# Patient Record
Sex: FEMALE | Race: WHITE | Employment: FULL TIME | URBAN - METROPOLITAN AREA
[De-identification: names, ages, dates, MRNs, and addresses within clinical notes are randomized per-mention and may not be internally consistent; named-entity substitution may affect disease eponyms.]

---

## 2017-01-27 ENCOUNTER — NURSE ONLY (OUTPATIENT)
Dept: INTERNAL MEDICINE | Age: 42
End: 2017-01-27

## 2017-01-27 DIAGNOSIS — E11.65 TYPE 2 DIABETES MELLITUS WITH HYPERGLYCEMIA, WITHOUT LONG-TERM CURRENT USE OF INSULIN (HCC): Primary | ICD-10-CM

## 2017-01-27 DIAGNOSIS — E55.9 VITAMIN D DEFICIENCY: ICD-10-CM

## 2017-01-27 DIAGNOSIS — E11.65 TYPE 2 DIABETES MELLITUS WITH HYPERGLYCEMIA, WITHOUT LONG-TERM CURRENT USE OF INSULIN (HCC): ICD-10-CM

## 2017-01-27 DIAGNOSIS — E03.9 ACQUIRED HYPOTHYROIDISM: ICD-10-CM

## 2017-01-27 DIAGNOSIS — E78.2 MIXED HYPERLIPIDEMIA: ICD-10-CM

## 2017-01-27 LAB
ALBUMIN SERPL-MCNC: 4.6 G/DL (ref 3.9–4.9)
ALP BLD-CCNC: 99 U/L (ref 40–130)
ALT SERPL-CCNC: 56 U/L (ref 0–33)
ANION GAP SERPL CALCULATED.3IONS-SCNC: 17 MEQ/L (ref 7–13)
AST SERPL-CCNC: 41 U/L (ref 0–35)
BASOPHILS ABSOLUTE: 0.1 K/UL (ref 0–0.2)
BASOPHILS RELATIVE PERCENT: 1.1 %
BILIRUB SERPL-MCNC: 0.4 MG/DL (ref 0–1.2)
BUN BLDV-MCNC: 18 MG/DL (ref 6–20)
CALCIUM SERPL-MCNC: 9.5 MG/DL (ref 8.6–10.2)
CHLORIDE BLD-SCNC: 97 MEQ/L (ref 98–107)
CHOLESTEROL, TOTAL: 156 MG/DL (ref 0–199)
CO2: 26 MEQ/L (ref 22–29)
CREAT SERPL-MCNC: 1.08 MG/DL (ref 0.5–0.9)
EOSINOPHILS ABSOLUTE: 0.2 K/UL (ref 0–0.7)
EOSINOPHILS RELATIVE PERCENT: 2.8 %
GFR AFRICAN AMERICAN: >60
GFR NON-AFRICAN AMERICAN: 55.3
GLOBULIN: 2.9 G/DL (ref 2.3–3.5)
GLUCOSE BLD-MCNC: 98 MG/DL (ref 74–109)
HBA1C MFR BLD: 6.6 % (ref 4.8–5.9)
HCT VFR BLD CALC: 37.7 % (ref 37–47)
HDLC SERPL-MCNC: 31 MG/DL (ref 40–59)
HEMOGLOBIN: 12.4 G/DL (ref 12–16)
LDL CHOLESTEROL CALCULATED: 82 MG/DL (ref 0–129)
LYMPHOCYTES ABSOLUTE: 2 K/UL (ref 1–4.8)
LYMPHOCYTES RELATIVE PERCENT: 29.3 %
MCH RBC QN AUTO: 30.8 PG (ref 27–31.3)
MCHC RBC AUTO-ENTMCNC: 32.9 % (ref 33–37)
MCV RBC AUTO: 93.9 FL (ref 82–100)
MONOCYTES ABSOLUTE: 0.5 K/UL (ref 0.2–0.8)
MONOCYTES RELATIVE PERCENT: 7.2 %
NEUTROPHILS ABSOLUTE: 4 K/UL (ref 1.4–6.5)
NEUTROPHILS RELATIVE PERCENT: 59.6 %
PDW BLD-RTO: 13.3 % (ref 11.5–14.5)
PLATELET # BLD: 296 K/UL (ref 130–400)
POTASSIUM SERPL-SCNC: 3.4 MEQ/L (ref 3.5–5.1)
RBC # BLD: 4.01 M/UL (ref 4.2–5.4)
SODIUM BLD-SCNC: 140 MEQ/L (ref 132–144)
TOTAL PROTEIN: 7.5 G/DL (ref 6.4–8.1)
TRIGL SERPL-MCNC: 213 MG/DL (ref 0–200)
TSH SERPL DL<=0.05 MIU/L-ACNC: 50.75 UIU/ML (ref 0.27–4.2)
VITAMIN D 25-HYDROXY: 33 NG/ML (ref 30–100)
WBC # BLD: 6.8 K/UL (ref 4.8–10.8)

## 2017-01-27 PROCEDURE — 36415 COLL VENOUS BLD VENIPUNCTURE: CPT | Performed by: PHYSICIAN ASSISTANT

## 2017-01-30 DIAGNOSIS — E87.6 HYPOKALEMIA: ICD-10-CM

## 2017-01-30 DIAGNOSIS — E03.9 ACQUIRED HYPOTHYROIDISM: Primary | ICD-10-CM

## 2017-01-30 RX ORDER — LEVOTHYROXINE SODIUM 0.15 MG/1
150 TABLET ORAL DAILY
Qty: 30 TABLET | Refills: 3 | Status: SHIPPED | OUTPATIENT
Start: 2017-01-30 | End: 2018-02-15 | Stop reason: SDUPTHER

## 2017-01-30 RX ORDER — POTASSIUM CHLORIDE 750 MG/1
TABLET, EXTENDED RELEASE ORAL
Qty: 36 TABLET | Refills: 1 | Status: SHIPPED | OUTPATIENT
Start: 2017-01-30 | End: 2018-03-08 | Stop reason: ALTCHOICE

## 2017-03-13 ENCOUNTER — PROCEDURE VISIT (OUTPATIENT)
Dept: OBGYN | Age: 42
End: 2017-03-13

## 2017-03-13 VITALS
HEART RATE: 96 BPM | DIASTOLIC BLOOD PRESSURE: 88 MMHG | WEIGHT: 187 LBS | BODY MASS INDEX: 39.42 KG/M2 | SYSTOLIC BLOOD PRESSURE: 138 MMHG

## 2017-03-13 DIAGNOSIS — N93.9 ABNORMAL UTERINE BLEEDING (AUB): Primary | ICD-10-CM

## 2017-03-13 LAB
CONTROL: YES
PREGNANCY TEST URINE, POC: NORMAL

## 2017-03-13 PROCEDURE — 58100 BIOPSY OF UTERUS LINING: CPT | Performed by: OBSTETRICS & GYNECOLOGY

## 2017-03-13 PROCEDURE — 99213 OFFICE O/P EST LOW 20 MIN: CPT | Performed by: OBSTETRICS & GYNECOLOGY

## 2017-03-13 PROCEDURE — 81025 URINE PREGNANCY TEST: CPT | Performed by: OBSTETRICS & GYNECOLOGY

## 2017-03-13 ASSESSMENT — ENCOUNTER SYMPTOMS
SHORTNESS OF BREATH: 0
VOMITING: 0
NAUSEA: 0
COUGH: 0

## 2017-03-27 ENCOUNTER — OFFICE VISIT (OUTPATIENT)
Dept: OBGYN | Age: 42
End: 2017-03-27

## 2017-03-27 VITALS
WEIGHT: 190 LBS | BODY MASS INDEX: 40.05 KG/M2 | HEART RATE: 92 BPM | DIASTOLIC BLOOD PRESSURE: 66 MMHG | SYSTOLIC BLOOD PRESSURE: 104 MMHG

## 2017-03-27 DIAGNOSIS — R93.5 ABNORMAL ULTRASOUND OF UTERUS: Primary | ICD-10-CM

## 2017-03-27 PROCEDURE — 99213 OFFICE O/P EST LOW 20 MIN: CPT | Performed by: OBSTETRICS & GYNECOLOGY

## 2017-03-28 ENCOUNTER — TELEPHONE (OUTPATIENT)
Dept: OBGYN | Age: 42
End: 2017-03-28

## 2017-03-29 RX ORDER — IBUPROFEN 800 MG/1
800 TABLET ORAL EVERY 8 HOURS PRN
Qty: 60 TABLET | Refills: 0 | Status: SHIPPED | OUTPATIENT
Start: 2017-03-29 | End: 2018-03-08 | Stop reason: ALTCHOICE

## 2017-04-02 ASSESSMENT — ENCOUNTER SYMPTOMS
SHORTNESS OF BREATH: 0
COUGH: 0
NAUSEA: 0
VOMITING: 0

## 2017-04-17 ENCOUNTER — TELEPHONE (OUTPATIENT)
Dept: OBGYN | Age: 42
End: 2017-04-17

## 2017-04-17 RX ORDER — DOXYCYCLINE HYCLATE 100 MG
100 TABLET ORAL 2 TIMES DAILY
Qty: 20 TABLET | Refills: 0 | Status: SHIPPED | OUTPATIENT
Start: 2017-04-17 | End: 2017-04-27

## 2017-04-20 ENCOUNTER — TELEPHONE (OUTPATIENT)
Dept: OBGYN | Age: 42
End: 2017-04-20

## 2017-07-03 RX ORDER — LORATADINE 10 MG/1
TABLET ORAL
Qty: 30 TABLET | Refills: 5 | Status: SHIPPED | OUTPATIENT
Start: 2017-07-03 | End: 2018-09-02 | Stop reason: SDUPTHER

## 2017-10-16 RX ORDER — OMEPRAZOLE 20 MG/1
CAPSULE, DELAYED RELEASE ORAL
Qty: 60 CAPSULE | Refills: 0 | Status: SHIPPED | OUTPATIENT
Start: 2017-10-16 | End: 2018-03-08 | Stop reason: SDUPTHER

## 2018-02-16 RX ORDER — LEVOTHYROXINE SODIUM 0.15 MG/1
150 TABLET ORAL DAILY
Qty: 30 TABLET | Refills: 3 | Status: SHIPPED | OUTPATIENT
Start: 2018-02-16 | End: 2019-01-22 | Stop reason: SDUPTHER

## 2018-03-08 ENCOUNTER — OFFICE VISIT (OUTPATIENT)
Dept: INTERNAL MEDICINE CLINIC | Age: 43
End: 2018-03-08
Payer: COMMERCIAL

## 2018-03-08 VITALS
WEIGHT: 202 LBS | HEIGHT: 57 IN | BODY MASS INDEX: 43.58 KG/M2 | DIASTOLIC BLOOD PRESSURE: 80 MMHG | RESPIRATION RATE: 20 BRPM | OXYGEN SATURATION: 97 % | SYSTOLIC BLOOD PRESSURE: 126 MMHG | TEMPERATURE: 97.8 F | HEART RATE: 86 BPM

## 2018-03-08 DIAGNOSIS — D64.9 ANEMIA, UNSPECIFIED TYPE: ICD-10-CM

## 2018-03-08 DIAGNOSIS — Z12.31 SCREENING MAMMOGRAM, ENCOUNTER FOR: ICD-10-CM

## 2018-03-08 DIAGNOSIS — G89.29 CHRONIC LEFT-SIDED LOW BACK PAIN WITHOUT SCIATICA: ICD-10-CM

## 2018-03-08 DIAGNOSIS — K21.9 GASTROESOPHAGEAL REFLUX DISEASE WITHOUT ESOPHAGITIS: ICD-10-CM

## 2018-03-08 DIAGNOSIS — E03.9 ACQUIRED HYPOTHYROIDISM: ICD-10-CM

## 2018-03-08 DIAGNOSIS — R60.0 LOCALIZED EDEMA: ICD-10-CM

## 2018-03-08 DIAGNOSIS — I10 ESSENTIAL HYPERTENSION: ICD-10-CM

## 2018-03-08 DIAGNOSIS — E78.2 MIXED HYPERLIPIDEMIA: ICD-10-CM

## 2018-03-08 DIAGNOSIS — M54.50 CHRONIC LEFT-SIDED LOW BACK PAIN WITHOUT SCIATICA: ICD-10-CM

## 2018-03-08 LAB
ALBUMIN SERPL-MCNC: 4.3 G/DL (ref 3.9–4.9)
ALP BLD-CCNC: 61 U/L (ref 40–130)
ALT SERPL-CCNC: 45 U/L (ref 0–33)
ANION GAP SERPL CALCULATED.3IONS-SCNC: 12 MEQ/L (ref 7–13)
AST SERPL-CCNC: 35 U/L (ref 0–35)
BASOPHILS ABSOLUTE: 0.1 K/UL (ref 0–0.2)
BASOPHILS RELATIVE PERCENT: 2 %
BILIRUB SERPL-MCNC: 0.4 MG/DL (ref 0–1.2)
BUN BLDV-MCNC: 15 MG/DL (ref 6–20)
CALCIUM SERPL-MCNC: 9.1 MG/DL (ref 8.6–10.2)
CHLORIDE BLD-SCNC: 95 MEQ/L (ref 98–107)
CHOLESTEROL, TOTAL: 256 MG/DL (ref 0–199)
CO2: 31 MEQ/L (ref 22–29)
CREAT SERPL-MCNC: 0.94 MG/DL (ref 0.5–0.9)
EOSINOPHILS ABSOLUTE: 0.2 K/UL (ref 0–0.7)
EOSINOPHILS RELATIVE PERCENT: 4.2 %
GFR AFRICAN AMERICAN: >60
GFR NON-AFRICAN AMERICAN: >60
GLOBULIN: 3.7 G/DL (ref 2.3–3.5)
GLUCOSE BLD-MCNC: 144 MG/DL (ref 74–109)
HCT VFR BLD CALC: 42.3 % (ref 37–47)
HDLC SERPL-MCNC: 35 MG/DL (ref 40–59)
HEMOGLOBIN: 13.8 G/DL (ref 12–16)
LDL CHOLESTEROL CALCULATED: 183 MG/DL (ref 0–129)
LYMPHOCYTES ABSOLUTE: 1.9 K/UL (ref 1–4.8)
LYMPHOCYTES RELATIVE PERCENT: 32.8 %
MCH RBC QN AUTO: 31.9 PG (ref 27–31.3)
MCHC RBC AUTO-ENTMCNC: 32.6 % (ref 33–37)
MCV RBC AUTO: 97.9 FL (ref 82–100)
MONOCYTES ABSOLUTE: 0.5 K/UL (ref 0.2–0.8)
MONOCYTES RELATIVE PERCENT: 8.5 %
NEUTROPHILS ABSOLUTE: 3.1 K/UL (ref 1.4–6.5)
NEUTROPHILS RELATIVE PERCENT: 52.5 %
PDW BLD-RTO: 14.9 % (ref 11.5–14.5)
PLATELET # BLD: 327 K/UL (ref 130–400)
POTASSIUM SERPL-SCNC: 4 MEQ/L (ref 3.5–5.1)
RBC # BLD: 4.32 M/UL (ref 4.2–5.4)
SODIUM BLD-SCNC: 138 MEQ/L (ref 132–144)
TOTAL PROTEIN: 8 G/DL (ref 6.4–8.1)
TRIGL SERPL-MCNC: 189 MG/DL (ref 0–200)
TSH REFLEX: 68.98 UIU/ML (ref 0.27–4.2)
WBC # BLD: 5.9 K/UL (ref 4.8–10.8)

## 2018-03-08 PROCEDURE — G8427 DOCREV CUR MEDS BY ELIG CLIN: HCPCS | Performed by: PHYSICIAN ASSISTANT

## 2018-03-08 PROCEDURE — 99214 OFFICE O/P EST MOD 30 MIN: CPT | Performed by: PHYSICIAN ASSISTANT

## 2018-03-08 PROCEDURE — 96127 BRIEF EMOTIONAL/BEHAV ASSMT: CPT | Performed by: PHYSICIAN ASSISTANT

## 2018-03-08 PROCEDURE — 3046F HEMOGLOBIN A1C LEVEL >9.0%: CPT | Performed by: PHYSICIAN ASSISTANT

## 2018-03-08 PROCEDURE — G8484 FLU IMMUNIZE NO ADMIN: HCPCS | Performed by: PHYSICIAN ASSISTANT

## 2018-03-08 PROCEDURE — G8417 CALC BMI ABV UP PARAM F/U: HCPCS | Performed by: PHYSICIAN ASSISTANT

## 2018-03-08 PROCEDURE — 1036F TOBACCO NON-USER: CPT | Performed by: PHYSICIAN ASSISTANT

## 2018-03-08 RX ORDER — TIZANIDINE 4 MG/1
4 TABLET ORAL 3 TIMES DAILY
Qty: 30 TABLET | Refills: 1 | Status: SHIPPED | OUTPATIENT
Start: 2018-03-08 | End: 2019-01-27 | Stop reason: SDUPTHER

## 2018-03-08 RX ORDER — NABUMETONE 500 MG/1
500 TABLET, FILM COATED ORAL 2 TIMES DAILY
Qty: 60 TABLET | Refills: 3 | Status: SHIPPED | OUTPATIENT
Start: 2018-03-08

## 2018-03-08 RX ORDER — SIMVASTATIN 80 MG
TABLET ORAL
Qty: 30 TABLET | Refills: 5 | Status: SHIPPED | OUTPATIENT
Start: 2018-03-08 | End: 2019-01-22 | Stop reason: SDUPTHER

## 2018-03-08 RX ORDER — OMEPRAZOLE 20 MG/1
CAPSULE, DELAYED RELEASE ORAL
Qty: 60 CAPSULE | Refills: 5 | Status: SHIPPED | OUTPATIENT
Start: 2018-03-08 | End: 2019-01-22 | Stop reason: SDUPTHER

## 2018-03-08 RX ORDER — TRIAMTERENE AND HYDROCHLOROTHIAZIDE 37.5; 25 MG/1; MG/1
TABLET ORAL
Qty: 30 TABLET | Refills: 5 | Status: SHIPPED | OUTPATIENT
Start: 2018-03-08 | End: 2019-01-22 | Stop reason: SDUPTHER

## 2018-03-08 ASSESSMENT — PATIENT HEALTH QUESTIONNAIRE - PHQ9
8. MOVING OR SPEAKING SO SLOWLY THAT OTHER PEOPLE COULD HAVE NOTICED. OR THE OPPOSITE, BEING SO FIGETY OR RESTLESS THAT YOU HAVE BEEN MOVING AROUND A LOT MORE THAN USUAL: 0
7. TROUBLE CONCENTRATING ON THINGS, SUCH AS READING THE NEWSPAPER OR WATCHING TELEVISION: 0
3. TROUBLE FALLING OR STAYING ASLEEP: 2
2. FEELING DOWN, DEPRESSED OR HOPELESS: 1
6. FEELING BAD ABOUT YOURSELF - OR THAT YOU ARE A FAILURE OR HAVE LET YOURSELF OR YOUR FAMILY DOWN: 1
10. IF YOU CHECKED OFF ANY PROBLEMS, HOW DIFFICULT HAVE THESE PROBLEMS MADE IT FOR YOU TO DO YOUR WORK, TAKE CARE OF THINGS AT HOME, OR GET ALONG WITH OTHER PEOPLE: 1
SUM OF ALL RESPONSES TO PHQ QUESTIONS 1-9: 9
4. FEELING TIRED OR HAVING LITTLE ENERGY: 3
9. THOUGHTS THAT YOU WOULD BE BETTER OFF DEAD, OR OF HURTING YOURSELF: 0
5. POOR APPETITE OR OVEREATING: 0
1. LITTLE INTEREST OR PLEASURE IN DOING THINGS: 2
SUM OF ALL RESPONSES TO PHQ9 QUESTIONS 1 & 2: 3

## 2018-03-08 ASSESSMENT — ENCOUNTER SYMPTOMS
COUGH: 1
GASTROINTESTINAL NEGATIVE: 1
BACK PAIN: 1
EYES NEGATIVE: 1
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
## 2018-03-09 LAB — T4 FREE: 0.82 NG/DL (ref 0.93–1.7)

## 2018-03-12 LAB — HBA1C MFR BLD: 7.9 % (ref 4.8–5.9)

## 2018-03-13 DIAGNOSIS — J30.89 ACUTE ALLERGIC RHINITIS DUE TO OTHER ALLERGEN, UNSPECIFIED SEASONALITY: Primary | ICD-10-CM

## 2018-03-13 RX ORDER — LANCETS 30 GAUGE
EACH MISCELLANEOUS
Qty: 100 EACH | Refills: 3 | Status: SHIPPED | OUTPATIENT
Start: 2018-03-13 | End: 2019-01-22 | Stop reason: SDUPTHER

## 2018-03-13 RX ORDER — FLUTICASONE PROPIONATE 50 MCG
1 SPRAY, SUSPENSION (ML) NASAL DAILY
Qty: 1 BOTTLE | Refills: 3 | Status: SHIPPED | OUTPATIENT
Start: 2018-03-13 | End: 2019-01-22 | Stop reason: SDUPTHER

## 2018-03-13 RX ORDER — BLOOD-GLUCOSE METER
1 KIT MISCELLANEOUS DAILY
Qty: 1 KIT | Refills: 0 | Status: SHIPPED | OUTPATIENT
Start: 2018-03-13

## 2018-09-04 RX ORDER — BENZOYL PEROXIDE
KIT TOPICAL
Qty: 30 TABLET | Refills: 5 | Status: SHIPPED | OUTPATIENT
Start: 2018-09-04 | End: 2020-02-15

## 2018-09-04 NOTE — TELEPHONE ENCOUNTER
Rx requested:  Requested Prescriptions     Pending Prescriptions Disp Refills    EQ ALLERGY RELIEF 10 MG tablet [Pharmacy Med Name: ALLERGY RELF 10MG   TAB] 30 tablet 5     Sig: TAKE ONE TABLET BY MOUTH ONCE DAILY       Last Office Visit:   3/8/2018      Next Visit Date:  No future appointments.

## 2019-01-21 ENCOUNTER — OFFICE VISIT (OUTPATIENT)
Dept: FAMILY MEDICINE CLINIC | Age: 44
End: 2019-01-21
Payer: COMMERCIAL

## 2019-01-21 VITALS
DIASTOLIC BLOOD PRESSURE: 76 MMHG | WEIGHT: 192 LBS | RESPIRATION RATE: 18 BRPM | OXYGEN SATURATION: 98 % | HEIGHT: 57 IN | SYSTOLIC BLOOD PRESSURE: 130 MMHG | BODY MASS INDEX: 41.42 KG/M2 | HEART RATE: 78 BPM | TEMPERATURE: 97.5 F

## 2019-01-21 DIAGNOSIS — R11.0 NAUSEA: ICD-10-CM

## 2019-01-21 DIAGNOSIS — Z86.69 HISTORY OF PERFORATION OF TYMPANIC MEMBRANE: Chronic | ICD-10-CM

## 2019-01-21 DIAGNOSIS — J06.9 VIRAL URI: ICD-10-CM

## 2019-01-21 DIAGNOSIS — H66.91 ACUTE OTITIS MEDIA WITH PERFORATION, RIGHT: Primary | Chronic | ICD-10-CM

## 2019-01-21 DIAGNOSIS — H72.91 ACUTE OTITIS MEDIA WITH PERFORATION, RIGHT: Primary | Chronic | ICD-10-CM

## 2019-01-21 DIAGNOSIS — H72.91 ACUTE OTITIS MEDIA WITH PERFORATION, RIGHT: Chronic | ICD-10-CM

## 2019-01-21 DIAGNOSIS — H66.91 ACUTE OTITIS MEDIA WITH PERFORATION, RIGHT: Chronic | ICD-10-CM

## 2019-01-21 PROCEDURE — G8427 DOCREV CUR MEDS BY ELIG CLIN: HCPCS | Performed by: FAMILY MEDICINE

## 2019-01-21 PROCEDURE — G8484 FLU IMMUNIZE NO ADMIN: HCPCS | Performed by: FAMILY MEDICINE

## 2019-01-21 PROCEDURE — 99214 OFFICE O/P EST MOD 30 MIN: CPT | Performed by: FAMILY MEDICINE

## 2019-01-21 PROCEDURE — 1036F TOBACCO NON-USER: CPT | Performed by: FAMILY MEDICINE

## 2019-01-21 PROCEDURE — G8417 CALC BMI ABV UP PARAM F/U: HCPCS | Performed by: FAMILY MEDICINE

## 2019-01-21 RX ORDER — AMOXICILLIN AND CLAVULANATE POTASSIUM 875; 125 MG/1; MG/1
1 TABLET, FILM COATED ORAL 2 TIMES DAILY
Qty: 20 TABLET | Refills: 0 | Status: SHIPPED | OUTPATIENT
Start: 2019-01-21 | End: 2019-01-31

## 2019-01-21 RX ORDER — CIPROFLOXACIN AND DEXAMETHASONE 3; 1 MG/ML; MG/ML
4 SUSPENSION/ DROPS AURICULAR (OTIC) 2 TIMES DAILY
Qty: 7.5 ML | Refills: 0 | Status: SHIPPED | OUTPATIENT
Start: 2019-01-21 | End: 2019-01-28

## 2019-01-21 RX ORDER — ONDANSETRON 4 MG/1
4 TABLET, ORALLY DISINTEGRATING ORAL 3 TIMES DAILY PRN
Qty: 21 TABLET | Refills: 0 | Status: SHIPPED | OUTPATIENT
Start: 2019-01-21 | End: 2019-04-30 | Stop reason: ALTCHOICE

## 2019-01-22 ENCOUNTER — OFFICE VISIT (OUTPATIENT)
Dept: INTERNAL MEDICINE CLINIC | Age: 44
End: 2019-01-22
Payer: COMMERCIAL

## 2019-01-22 VITALS
DIASTOLIC BLOOD PRESSURE: 70 MMHG | TEMPERATURE: 97.3 F | RESPIRATION RATE: 16 BRPM | SYSTOLIC BLOOD PRESSURE: 128 MMHG | HEIGHT: 58 IN | OXYGEN SATURATION: 97 % | HEART RATE: 68 BPM | BODY MASS INDEX: 40.47 KG/M2 | WEIGHT: 192.8 LBS

## 2019-01-22 DIAGNOSIS — Z11.1 VISIT FOR MANTOUX TEST: ICD-10-CM

## 2019-01-22 DIAGNOSIS — J30.9 ALLERGIC RHINITIS, UNSPECIFIED SEASONALITY, UNSPECIFIED TRIGGER: ICD-10-CM

## 2019-01-22 DIAGNOSIS — H65.01 RIGHT ACUTE SEROUS OTITIS MEDIA, RECURRENCE NOT SPECIFIED: ICD-10-CM

## 2019-01-22 DIAGNOSIS — I10 ESSENTIAL HYPERTENSION: ICD-10-CM

## 2019-01-22 DIAGNOSIS — Z12.31 SCREENING MAMMOGRAM, ENCOUNTER FOR: ICD-10-CM

## 2019-01-22 DIAGNOSIS — E03.9 ACQUIRED HYPOTHYROIDISM: ICD-10-CM

## 2019-01-22 DIAGNOSIS — Z11.1 SCREENING FOR TUBERCULOSIS: ICD-10-CM

## 2019-01-22 DIAGNOSIS — E11.9 TYPE 2 DIABETES MELLITUS WITHOUT COMPLICATION, WITHOUT LONG-TERM CURRENT USE OF INSULIN (HCC): Primary | ICD-10-CM

## 2019-01-22 DIAGNOSIS — E78.2 MIXED HYPERLIPIDEMIA: ICD-10-CM

## 2019-01-22 DIAGNOSIS — M54.9 ACUTE MIDLINE BACK PAIN, UNSPECIFIED BACK LOCATION: ICD-10-CM

## 2019-01-22 DIAGNOSIS — K21.9 GASTROESOPHAGEAL REFLUX DISEASE WITHOUT ESOPHAGITIS: ICD-10-CM

## 2019-01-22 DIAGNOSIS — L30.8 OTHER ECZEMA: ICD-10-CM

## 2019-01-22 LAB — HBA1C MFR BLD: 6.2 %

## 2019-01-22 PROCEDURE — 2022F DILAT RTA XM EVC RTNOPTHY: CPT | Performed by: PHYSICIAN ASSISTANT

## 2019-01-22 PROCEDURE — 3044F HG A1C LEVEL LT 7.0%: CPT | Performed by: PHYSICIAN ASSISTANT

## 2019-01-22 PROCEDURE — G8427 DOCREV CUR MEDS BY ELIG CLIN: HCPCS | Performed by: PHYSICIAN ASSISTANT

## 2019-01-22 PROCEDURE — 99214 OFFICE O/P EST MOD 30 MIN: CPT | Performed by: PHYSICIAN ASSISTANT

## 2019-01-22 PROCEDURE — 83036 HEMOGLOBIN GLYCOSYLATED A1C: CPT | Performed by: PHYSICIAN ASSISTANT

## 2019-01-22 PROCEDURE — G8417 CALC BMI ABV UP PARAM F/U: HCPCS | Performed by: PHYSICIAN ASSISTANT

## 2019-01-22 PROCEDURE — 1036F TOBACCO NON-USER: CPT | Performed by: PHYSICIAN ASSISTANT

## 2019-01-22 PROCEDURE — G8484 FLU IMMUNIZE NO ADMIN: HCPCS | Performed by: PHYSICIAN ASSISTANT

## 2019-01-22 RX ORDER — TRIAMTERENE AND HYDROCHLOROTHIAZIDE 37.5; 25 MG/1; MG/1
TABLET ORAL
Qty: 30 TABLET | Refills: 5 | Status: ON HOLD | OUTPATIENT
Start: 2019-01-22 | End: 2020-12-21

## 2019-01-22 RX ORDER — LEVOTHYROXINE SODIUM 0.15 MG/1
150 TABLET ORAL DAILY
Qty: 30 TABLET | Refills: 3 | Status: SHIPPED | OUTPATIENT
Start: 2019-01-22 | End: 2020-04-15 | Stop reason: SDUPTHER

## 2019-01-22 RX ORDER — SIMVASTATIN 80 MG
TABLET ORAL
Qty: 30 TABLET | Refills: 5 | Status: SHIPPED | OUTPATIENT
Start: 2019-01-22 | End: 2020-04-15 | Stop reason: SDUPTHER

## 2019-01-22 RX ORDER — IBUPROFEN 800 MG/1
800 TABLET ORAL EVERY 6 HOURS PRN
Qty: 120 TABLET | Refills: 1 | Status: SHIPPED | OUTPATIENT
Start: 2019-01-22 | End: 2020-04-16

## 2019-01-22 RX ORDER — LANCETS 30 GAUGE
EACH MISCELLANEOUS
Qty: 100 EACH | Refills: 3 | Status: SHIPPED | OUTPATIENT
Start: 2019-01-22

## 2019-01-22 RX ORDER — BLOOD-GLUCOSE METER
1 KIT MISCELLANEOUS DAILY
Qty: 1 KIT | Refills: 0 | Status: SHIPPED | OUTPATIENT
Start: 2019-01-22 | End: 2020-04-15 | Stop reason: SDUPTHER

## 2019-01-22 RX ORDER — OMEPRAZOLE 20 MG/1
CAPSULE, DELAYED RELEASE ORAL
Qty: 60 CAPSULE | Refills: 5 | Status: ON HOLD | OUTPATIENT
Start: 2019-01-22 | End: 2020-12-21

## 2019-01-22 RX ORDER — DESOXIMETASONE 0.5 MG/G
OINTMENT TOPICAL
Qty: 45 G | Refills: 1 | Status: SHIPPED | OUTPATIENT
Start: 2019-01-22 | End: 2021-09-02

## 2019-01-22 RX ORDER — FLUTICASONE PROPIONATE 50 MCG
1 SPRAY, SUSPENSION (ML) NASAL DAILY
Qty: 1 BOTTLE | Refills: 3 | Status: SHIPPED | OUTPATIENT
Start: 2019-01-22 | End: 2020-02-15

## 2019-01-22 RX ORDER — LANOLIN ALCOHOL/MO/W.PET/CERES
CREAM (GRAM) TOPICAL 2 TIMES DAILY
Qty: 1 CONTAINER | Refills: 3 | Status: SHIPPED | OUTPATIENT
Start: 2019-01-22 | End: 2021-09-02

## 2019-01-22 ASSESSMENT — ENCOUNTER SYMPTOMS
WHEEZING: 0
SHORTNESS OF BREATH: 0
CONSTIPATION: 0
BACK PAIN: 1
NAUSEA: 0
VOMITING: 0
DIARRHEA: 0
SORE THROAT: 1
EYE PAIN: 0
COUGH: 1
RECTAL PAIN: 0

## 2019-01-24 DIAGNOSIS — H66.91 ACUTE OTITIS MEDIA WITH PERFORATION, RIGHT: Primary | Chronic | ICD-10-CM

## 2019-01-24 DIAGNOSIS — H72.91 ACUTE OTITIS MEDIA WITH PERFORATION, RIGHT: Primary | Chronic | ICD-10-CM

## 2019-01-24 LAB
CULTURE EAR OR EYE: ABNORMAL
GRAM STAIN RESULT: ABNORMAL
ORGANISM: ABNORMAL
ORGANISM: ABNORMAL

## 2019-01-24 RX ORDER — SULFAMETHOXAZOLE AND TRIMETHOPRIM 800; 160 MG/1; MG/1
1 TABLET ORAL 2 TIMES DAILY
Qty: 14 TABLET | Refills: 0 | Status: SHIPPED | OUTPATIENT
Start: 2019-01-24 | End: 2019-01-31

## 2019-01-25 ENCOUNTER — NURSE ONLY (OUTPATIENT)
Dept: INTERNAL MEDICINE CLINIC | Age: 44
End: 2019-01-25
Payer: COMMERCIAL

## 2019-01-25 DIAGNOSIS — Z11.1 VISIT FOR TB SKIN TEST: Primary | ICD-10-CM

## 2019-01-25 LAB
INDURATION: NORMAL
TB SKIN TEST: NEGATIVE

## 2019-01-25 PROCEDURE — 86580 TB INTRADERMAL TEST: CPT | Performed by: PHYSICIAN ASSISTANT

## 2019-04-30 ENCOUNTER — OFFICE VISIT (OUTPATIENT)
Dept: FAMILY MEDICINE CLINIC | Age: 44
End: 2019-04-30
Payer: COMMERCIAL

## 2019-04-30 VITALS
TEMPERATURE: 97.5 F | HEIGHT: 60 IN | RESPIRATION RATE: 12 BRPM | OXYGEN SATURATION: 100 % | WEIGHT: 183 LBS | HEART RATE: 68 BPM | BODY MASS INDEX: 35.93 KG/M2 | DIASTOLIC BLOOD PRESSURE: 62 MMHG | SYSTOLIC BLOOD PRESSURE: 124 MMHG

## 2019-04-30 DIAGNOSIS — J01.90 ACUTE BACTERIAL SINUSITIS: Primary | ICD-10-CM

## 2019-04-30 DIAGNOSIS — B96.89 ACUTE BACTERIAL SINUSITIS: Primary | ICD-10-CM

## 2019-04-30 PROCEDURE — G8417 CALC BMI ABV UP PARAM F/U: HCPCS | Performed by: NURSE PRACTITIONER

## 2019-04-30 PROCEDURE — 99213 OFFICE O/P EST LOW 20 MIN: CPT | Performed by: NURSE PRACTITIONER

## 2019-04-30 PROCEDURE — 1036F TOBACCO NON-USER: CPT | Performed by: NURSE PRACTITIONER

## 2019-04-30 PROCEDURE — G8427 DOCREV CUR MEDS BY ELIG CLIN: HCPCS | Performed by: NURSE PRACTITIONER

## 2019-04-30 RX ORDER — DOXYCYCLINE HYCLATE 100 MG
100 TABLET ORAL 2 TIMES DAILY
Qty: 20 TABLET | Refills: 0 | Status: SHIPPED | OUTPATIENT
Start: 2019-04-30 | End: 2019-05-10

## 2019-04-30 ASSESSMENT — ENCOUNTER SYMPTOMS
NAUSEA: 1
PHOTOPHOBIA: 1
VOMITING: 0
SINUS PAIN: 1
RHINORRHEA: 1
COUGH: 0
SINUS PRESSURE: 1
DIARRHEA: 1
VISUAL CHANGE: 0

## 2019-04-30 ASSESSMENT — PATIENT HEALTH QUESTIONNAIRE - PHQ9
2. FEELING DOWN, DEPRESSED OR HOPELESS: 0
SUM OF ALL RESPONSES TO PHQ9 QUESTIONS 1 & 2: 0
1. LITTLE INTEREST OR PLEASURE IN DOING THINGS: 0
SUM OF ALL RESPONSES TO PHQ QUESTIONS 1-9: 0
SUM OF ALL RESPONSES TO PHQ QUESTIONS 1-9: 0

## 2019-04-30 NOTE — PATIENT INSTRUCTIONS
Patient Education        Sinusitis: Care Instructions  Your Care Instructions    Sinusitis is an infection of the lining of the sinus cavities in your head. Sinusitis often follows a cold. It causes pain and pressure in your head and face. In most cases, sinusitis gets better on its own in 1 to 2 weeks. But some mild symptoms may last for several weeks. Sometimes antibiotics are needed. Follow-up care is a key part of your treatment and safety. Be sure to make and go to all appointments, and call your doctor if you are having problems. It's also a good idea to know your test results and keep a list of the medicines you take. How can you care for yourself at home? · Take an over-the-counter pain medicine, such as acetaminophen (Tylenol), ibuprofen (Advil, Motrin), or naproxen (Aleve). Read and follow all instructions on the label. · If the doctor prescribed antibiotics, take them as directed. Do not stop taking them just because you feel better. You need to take the full course of antibiotics. · Be careful when taking over-the-counter cold or flu medicines and Tylenol at the same time. Many of these medicines have acetaminophen, which is Tylenol. Read the labels to make sure that you are not taking more than the recommended dose. Too much acetaminophen (Tylenol) can be harmful. · Breathe warm, moist air from a steamy shower, a hot bath, or a sink filled with hot water. Avoid cold, dry air. Using a humidifier in your home may help. Follow the directions for cleaning the machine. · Use saline (saltwater) nasal washes to help keep your nasal passages open and wash out mucus and bacteria. You can buy saline nose drops at a grocery store or drugstore. Or you can make your own at home by adding 1 teaspoon of salt and 1 teaspoon of baking soda to 2 cups of distilled water. If you make your own, fill a bulb syringe with the solution, insert the tip into your nostril, and squeeze gently. [place] your nose.   · Put a hot, wet towel or a warm gel pack on your face 3 or 4 times a day for 5 to 10 minutes each time. · Try a decongestant nasal spray like oxymetazoline (Afrin). Do not use it for more than 3 days in a row. Using it for more than 3 days can make your congestion worse. When should you call for help? Call your doctor now or seek immediate medical care if:    · You have new or worse swelling or redness in your face or around your eyes.     · You have a new or higher fever.    Watch closely for changes in your health, and be sure to contact your doctor if:    · You have new or worse facial pain.     · The mucus from your nose becomes thicker (like pus) or has new blood in it.     · You are not getting better as expected. Where can you learn more? Go to https://[email or web address] [email or web address].[email or web address]. org and sign in to your [employer] account. Enter U492 in the [employer] box to learn more about \"Sinusitis: Care Instructions. \"     If you do not have an account, please click on the \"Sign Up Now\" link. Current as of: March 27, 2018  Content Version: 11.9  © 0969-5135 [employer]. Care instructions adapted under license by [facility] ([place]). If you have questions about a medical condition or this instruction, always ask your healthcare professional. [address] any warranty or liability for your use of this information.

## 2019-04-30 NOTE — PROGRESS NOTES
Subjective:      Patient ID: Krystina Carpio is a 37 y.o. female who presents today with a complaint of   Chief Complaint   Patient presents with    Headache     x 3 days     Epistaxis     x 3days     Fatigue     x 3 days     Congestion     x 3 days    Has had recurrent nose bleeds, they are becoming more frequent. Woke up Monday morning with nosebleed, headache, stomach pain and diarrhea  The nose bleed has stopped  Other symptoms have continued. Symptoms are about the same. Headache    This is a new problem. Episode onset: 2-3 days ago. The problem occurs intermittently. The problem has been unchanged. The pain is located in the bilateral and frontal region. The quality of the pain is described as aching and throbbing. The pain is at a severity of 7/10 (a 9/10 at worst ). Associated symptoms include nausea, phonophobia, photophobia, rhinorrhea and sinus pressure. Pertinent negatives include no coughing, dizziness, visual change or vomiting. She has tried acetaminophen and NSAIDs for the symptoms. There is no history of migraine headaches.        Past Medical History:   Diagnosis Date    Asthma     Depression     Endometriosis 03/2017    Hypothyroidism     Postmenopausal 2008    Type II diabetes mellitus, uncontrolled ([address]      Past Surgical History:   Procedure Laterality Date    DENTAL SURGERY      ENDOMETRIAL BIOPSY  03/2017    Alice lala    INNER EAR SURGERY Right 1993    ear perforation    ROTATOR CUFF REPAIR Left 12/12/2014    TONSILLECTOMY AND ADENOIDECTOMY      TYMPANOSTOMY TUBE PLACEMENT       Family History   Problem Relation Age of Onset    Depression Mother     Diabetes Maternal Aunt     Diabetes Maternal Uncle     Asthma Maternal Grandmother     Cancer Maternal Grandmother         lung    High Blood Pressure Maternal Grandmother     High Blood Pressure Maternal Grandfather     Stroke Maternal Grandfather      Social History     Socioeconomic History    Marital status:  Spouse name: Not on file    Number of children: Not on file    Years of education: Not on file    Highest education level: Not on file   Occupational History    Not on file   Social Needs    Financial resource strain: Not on file    Food insecurity:     Worry: Not on file     Inability: Not on file    Transportation needs:     Medical: Not on file     Non-medical: Not on file   Tobacco Use    Smoking status: Never Smoker    Smokeless tobacco: Never Used   Substance and Sexual Activity    Alcohol use: No    Drug use: No    Sexual activity: Yes   Lifestyle    Physical activity:     Days per week: Not on file     Minutes per session: Not on file    Stress: Not on file   Relationships    Social connections:     Talks on phone: Not on file     Gets together: Not on file     Attends [employer] service: Not on file     Active member of club or organization: Not on file     Attends meetings of clubs or organizations: Not on file     Relationship status: Not on file    Intimate partner violence:     Fear of current or ex partner: Not on file     Emotionally abused: Not on file     Physically abused: Not on file     Forced sexual activity: Not on file   Other Topics Concern    Not on file   Social History Narrative    Not on file       Allergies:  Patient has no known allergies. Review of Systems   Constitutional: Negative for appetite change. HENT: Positive for congestion, nosebleeds, rhinorrhea, sinus pressure and sinus pain. Eyes: Positive for photophobia. Respiratory: Negative for cough. Gastrointestinal: Positive for diarrhea (about three times a day ) and nausea. Negative for vomiting. Neurological: Positive for headaches. Negative for dizziness.          Objective:   /62 (Site: Left Upper Arm, Position: Sitting, Cuff Size: Medium Adult)   Pulse 68   Temp 97.5 °F (36.4 °C) (Temporal)   Resp 12   Ht 5' (1.524 m)   Wt 183 lb (83 kg)   SpO2 100%   BMI 35.74 kg/m²   Physical Exam   Constitutional: She appears well-developed and well-nourished. She does not appear ill. No distress. HENT:   Right Ear: Tympanic membrane normal.   Left Ear: Tympanic membrane normal.   Nose: Mucosal edema and rhinorrhea present. Right sinus exhibits maxillary sinus tenderness and frontal sinus tenderness. Left sinus exhibits maxillary sinus tenderness and frontal sinus tenderness. Mouth/Throat: Oropharynx is clear and moist.   Cardiovascular: Normal rate and regular rhythm. No murmur heard. Pulmonary/Chest: Effort normal and breath sounds normal. No respiratory distress. Skin: She is not diaphoretic. No results found for this visit on 04/30/19. Assessment:       Diagnosis Orders   1. Acute bacterial sinusitis  doxycycline hyclate (VIBRA-TABS) 100 MG tablet           Plan:      No orders of the defined types were placed in this encounter. Orders Placed This Encounter   Medications    doxycycline hyclate (VIBRA-TABS) 100 MG tablet     Sig: Take 1 tablet by mouth 2 times daily for 10 days     Dispense:  20 tablet     Refill:  0     Treated for bacterial sinusitis based on clinical symptoms and history (length of illness, \"double sickening\", fever, purulent discharge, tenderness on palpation). . Advised to take full course of antibiotics. Follow up with PCP if no improvement in 5-6 days. Patient verbalized understanding. Return if symptoms worsen or fail to improve.     Casi Cordero, YANELY - CNP

## 2019-04-30 NOTE — LETTER
[address] [facility]  [address] [facility]  [address] [place] [id number]  Phone: [phone]  Fax: [phone]    YANELY Rojas CNP        April 30, 2019     Patient: Yadi Gonzalez   YOB: 1975   Date of Visit: 4/30/2019       To Whom it May Concern:    Yadi Gonzalez was seen in my clinic on 4/30/2019. She will return on 5/1    If you have any questions or concerns, please don't hesitate to call.     Sincerely,             YANELY Rojas CNP

## 2020-02-15 NOTE — TELEPHONE ENCOUNTER
Rx requested:  Requested Prescriptions     Pending Prescriptions Disp Refills    fluticasone (FLONASE) 50 MCG/ACT nasal spray [Pharmacy Med Name: Fluticasone Propionate 50 MCG/ACT Nasal Suspension] 1 Bottle 3     Sig: USE 1 SPRAY(S) IN EACH NOSTRIL ONCE DAILY       Last Office Visit:   1/22/2019      Next Visit Date:  No future appointments.

## 2020-02-17 RX ORDER — FLUTICASONE PROPIONATE 50 MCG
SPRAY, SUSPENSION (ML) NASAL
Qty: 1 BOTTLE | Refills: 3 | Status: SHIPPED | OUTPATIENT
Start: 2020-02-17

## 2020-02-17 RX ORDER — BENZOYL PEROXIDE
KIT TOPICAL
Qty: 30 TABLET | Refills: 2 | Status: SHIPPED | OUTPATIENT
Start: 2020-02-17

## 2020-04-15 ENCOUNTER — VIRTUAL VISIT (OUTPATIENT)
Dept: FAMILY MEDICINE CLINIC | Age: 45
End: 2020-04-15
Payer: COMMERCIAL

## 2020-04-15 PROCEDURE — 3046F HEMOGLOBIN A1C LEVEL >9.0%: CPT | Performed by: PHYSICIAN ASSISTANT

## 2020-04-15 PROCEDURE — G8428 CUR MEDS NOT DOCUMENT: HCPCS | Performed by: PHYSICIAN ASSISTANT

## 2020-04-15 PROCEDURE — 2022F DILAT RTA XM EVC RTNOPTHY: CPT | Performed by: PHYSICIAN ASSISTANT

## 2020-04-15 PROCEDURE — 99213 OFFICE O/P EST LOW 20 MIN: CPT | Performed by: PHYSICIAN ASSISTANT

## 2020-04-15 RX ORDER — BLOOD-GLUCOSE METER
1 KIT MISCELLANEOUS DAILY
Qty: 1 KIT | Refills: 0 | Status: SHIPPED | OUTPATIENT
Start: 2020-04-15

## 2020-04-15 RX ORDER — SIMVASTATIN 80 MG
TABLET ORAL
Qty: 30 TABLET | Refills: 3 | Status: SHIPPED | OUTPATIENT
Start: 2020-04-15 | End: 2021-09-02

## 2020-04-15 RX ORDER — LORATADINE 10 MG/1
10 TABLET ORAL DAILY
Qty: 30 TABLET | Refills: 3 | Status: SHIPPED | OUTPATIENT
Start: 2020-04-15 | End: 2021-01-13 | Stop reason: SDUPTHER

## 2020-04-15 RX ORDER — LEVOTHYROXINE SODIUM 0.15 MG/1
150 TABLET ORAL DAILY
Qty: 30 TABLET | Refills: 3 | Status: SHIPPED | OUTPATIENT
Start: 2020-04-15 | End: 2020-08-06 | Stop reason: SDUPTHER

## 2020-04-15 NOTE — PROGRESS NOTES
4/15/2020     Cherie Ramos (:  1975) is a 53 y.o. female, here for evaluation of the following medical concerns: Follow up  HPI  Telemedicine video visit due to concern for exposure to [place] [facility] (coronavirus). Patient is aware this is a billable visit. Patient has not been seen in over her year she has been out of her Synthroid and Glucophage for 3 months or more states her blood sugars have been well controlled despite not having medication with dietary control    Review of Systems   Allergic/Immunologic: Positive for environmental allergies. All other systems reviewed and are negative. Prior to Visit Medications    Medication Sig Taking? Authorizing Provider   simvastatin (ZOCOR) 80 MG tablet TAKE ONE TABLET BY MOUTH ONCE DAILY IN THE EVENING Yes Anika Stroud PA-C   metFORMIN (GLUCOPHAGE) 1000 MG tablet TAKE ONE TABLET BY MOUTH ONCE DAILY WITH  BREAKFAST Yes Anika Stroud PA-C   levothyroxine (LEVOTHROID) 150 MCG tablet Take 1 tablet by mouth daily Yes Anika Stroud PA-C   loratadine (CLARITIN) 10 MG tablet Take 1 tablet by mouth daily Yes Anika Stroud PA-C   glucose monitoring kit (FREESTYLE) monitoring kit 1 kit by Does not apply route daily Yes Anika Stroud PA-C   blood glucose test strips (ASCENSIA AUTODISC VI;ONE TOUCH ULTRA TEST VI) strip Test BID.   DX E11.8 NIDDM Yes Anika Stroud PA-C   EQ ALLERGY RELIEF 10 MG tablet TAKE 1 TABLET BY MOUTH ONCE DAILY  Anika Stroud PA-C   fluticasone (FLONASE) 50 MCG/ACT nasal spray USE 1 SPRAY(S) IN EACH NOSTRIL ONCE DAILY  Anika Stroud PA-C   tiZANidine (ZANAFLEX) 4 MG tablet TAKE 1 TABLET BY MOUTH THREE TIMES DAILY  Anika Stroud PA-C   ibuprofen (ADVIL;MOTRIN) 800 MG tablet Take 1 tablet by mouth every 6 hours as needed for Pain  Anika Stroud PA-C   triamterene-hydrochlorothiazide (MAXZIDE-25) 37.5-25 MG per tablet TAKE ONE TABLET BY MOUTH ONCE DAILY  Anika 3    glucose monitoring kit (FREESTYLE) monitoring kit     Si kit by Does not apply route daily     Dispense:  1 kit     Refill:  0    blood glucose test strips (ASCENSIA AUTODISC VI;ONE TOUCH ULTRA TEST VI) strip     Sig: Test BID. DX E11.8 NIDDM     Dispense:  100 strip     Refill:  6     Please fill what plan covers     Medications Discontinued During This Encounter   Medication Reason    simvastatin (ZOCOR) 80 MG tablet REORDER    metFORMIN (GLUCOPHAGE) 1000 MG tablet REORDER    levothyroxine (LEVOTHROID) 150 MCG tablet REORDER    glucose monitoring kit (FREESTYLE) monitoring kit REORDER    blood glucose test strips (ASCENSIA AUTODISC VI;ONE TOUCH ULTRA TEST VI) strip REORDER     Return in about 3 months (around 7/15/2020) for repeat labs, follow up on [facility]. Medications Discontinued During This Encounter   Medication Reason    simvastatin (ZOCOR) 80 MG tablet REORDER    metFORMIN (GLUCOPHAGE) 1000 MG tablet REORDER    levothyroxine (LEVOTHROID) 150 MCG tablet REORDER    glucose monitoring kit (FREESTYLE) monitoring kit REORDER    blood glucose test strips (ASCENSIA AUTODISC VI;ONE TOUCH ULTRA TEST VI) strip REORDER     Return in about 3 months (around 7/15/2020) for repeat labs, follow up on [facility]. Anika Stroud PA-C        Return in about 3 months (around 7/15/2020) for repeat labs, follow up on [facility]. An electronic signature was used to authenticate this note.     --Anika Stroud PA-C on 4/15/2020 at 11:07 AM

## 2020-08-06 ENCOUNTER — VIRTUAL VISIT (OUTPATIENT)
Dept: FAMILY MEDICINE CLINIC | Age: 45
End: 2020-08-06
Payer: COMMERCIAL

## 2020-08-06 PROCEDURE — 99442 PR PHYS/QHP TELEPHONE EVALUATION 11-20 MIN: CPT | Performed by: PHYSICIAN ASSISTANT

## 2020-08-06 RX ORDER — MECLIZINE HYDROCHLORIDE 25 MG/1
25 TABLET ORAL 3 TIMES DAILY PRN
Qty: 30 TABLET | Refills: 1 | Status: SHIPPED | OUTPATIENT
Start: 2020-08-06 | End: 2020-08-16

## 2020-08-06 RX ORDER — FLUTICASONE PROPIONATE 50 MCG
1 SPRAY, SUSPENSION (ML) NASAL DAILY
Qty: 1 BOTTLE | Refills: 0 | Status: SHIPPED | OUTPATIENT
Start: 2020-08-06

## 2020-08-06 RX ORDER — LEVOTHYROXINE SODIUM 0.15 MG/1
150 TABLET ORAL DAILY
Qty: 30 TABLET | Refills: 3 | Status: SHIPPED | OUTPATIENT
Start: 2020-08-06 | End: 2020-12-28

## 2020-08-06 ASSESSMENT — ENCOUNTER SYMPTOMS
EYES NEGATIVE: 1
GASTROINTESTINAL NEGATIVE: 1
SINUS PRESSURE: 1
SORE THROAT: 0
RHINORRHEA: 1
SINUS PAIN: 0
RESPIRATORY NEGATIVE: 1

## 2020-08-06 NOTE — PROGRESS NOTES
2020     Eveline Lepe (:  1975) is a 53 y.o. female, here for evaluation of the following medical concerns:  Dizziness, allergic  HPI  Medicine telephone visit due to concern for exposure to [facility] at [facility] (coronavirus). Patient is aware this is a billable visit. Patient with complaint of recurrent dizziness prescribed meclizine through urgent care which is very helpful but very sedating does complain of allergy sinus symptoms ear fullness also complaining of excessive wax in both ears been using Claritin but not Flonase  Needs refill on Synthroid she is taking daily  States she has not taken Glucophage in over a year since 2019 fasting blood sugar ranges from 120s to 140s    Review of Systems   Constitutional: Negative. HENT: Positive for congestion, postnasal drip, rhinorrhea and sinus pressure. Negative for sinus pain and sore throat. Eyes: Negative. Respiratory: Negative. Cardiovascular: Negative. Gastrointestinal: Negative. Endocrine: Negative. Genitourinary: Negative. Musculoskeletal: Negative. Allergic/Immunologic: Positive for environmental allergies. Neurological: Positive for dizziness. Hematological: Negative. Psychiatric/Behavioral: Negative. Prior to Visit Medications    Medication Sig Taking?  Authorizing Provider   carbamide peroxide (DEBROX) 6.5 % otic solution Place 5 drops in ear(s) 2 times daily Yes Anika Stroud PA-C   meclizine (ANTIVERT) 25 MG tablet Take 1 tablet by mouth 3 times daily as needed for Dizziness Yes Anika Stroud PA-C   fluticasone (FLONASE) 50 MCG/ACT nasal spray 1 spray by Each Nostril route daily Yes Anika Stroud PA-C   levothyroxine (LEVOTHROID) 150 MCG tablet Take 1 tablet by mouth daily Yes Anika Stroud PA-C   ibuprofen (ADVIL;MOTRIN) 800 MG tablet TAKE 1 TABLET BY MOUTH EVERY 6 HOURS AS NEEDED FOR PAIN Yes Anika Stroud PA-C   simvastatin (ZOCOR) 80 MG tablet TAKE ONE TABLET BY MOUTH ONCE DAILY IN THE EVENING Yes Anika Stroud PA-C   metFORMIN (GLUCOPHAGE) 1000 MG tablet TAKE ONE TABLET BY MOUTH ONCE DAILY WITH  BREAKFAST Yes Anika Stroud PA-C   loratadine (CLARITIN) 10 MG tablet Take 1 tablet by mouth daily Yes Anika Stroud PA-C   glucose monitoring kit (FREESTYLE) monitoring kit 1 kit by Does not apply route daily Yes Anika Stroud PA-C   blood glucose test strips (ASCENSIA AUTODISC VI;ONE TOUCH ULTRA TEST VI) strip Test BID. DX E11.8 NIDDM Yes Anika Stroud PA-C   EQ ALLERGY RELIEF 10 MG tablet TAKE 1 TABLET BY MOUTH ONCE DAILY Yes Anika Stroud PA-C   fluticasone (FLONASE) 50 MCG/ACT nasal spray USE 1 SPRAY(S) IN EACH NOSTRIL ONCE DAILY Yes Anika Stroud PA-C   tiZANidine (ZANAFLEX) 4 MG tablet TAKE 1 TABLET BY MOUTH THREE TIMES DAILY Yes Anika Stroud PA-C   triamterene-hydrochlorothiazide (MAXZIDE-25) 37.5-25 MG per tablet TAKE ONE TABLET BY MOUTH ONCE DAILY Yes Anika Stroud PA-C   omeprazole (PRILOSEC) 20 MG delayed release capsule TAKE ONE CAPSULE BY MOUTH TWICE DAILY Yes Anika Stroud PA-C   Lancets MISC Test BID. DX E11.8 NIDDM Yes Anika Stroud PA-C   desoximetasone (TOPICORT) 0.05 % OINT oitment Apply to affected area twice daily as needed Yes Anika Stroud PA-C   Skin Protectants, Misc. (HYDROCERIN) CREA cream Apply topically 2 times daily Yes Anika Stroud PA-C   glucose monitoring kit (FREESTYLE) monitoring kit 1 kit by Does not apply route daily DX E11.8 NIDDM Yes Anika Stroud PA-C   nabumetone (RELAFEN) 500 MG tablet Take 1 tablet by mouth 2 times daily Yes Anika Stroud PA-C        Social History     Tobacco Use    Smoking status: Never Smoker    Smokeless tobacco: Never Used   Substance Use Topics    Alcohol use: No        There were no vitals filed for this visit.   Estimated body mass index is 35.74 kg/m² as calculated from the

## 2020-08-07 ENCOUNTER — TELEPHONE (OUTPATIENT)
Dept: FAMILY MEDICINE CLINIC | Age: 45
End: 2020-08-07

## 2020-08-07 NOTE — TELEPHONE ENCOUNTER
Patient called to let you know that the eardrops were not covered by her insurance. I advised she could contact insurance to see what they will cover if any. She wanted to know what you would recommend.   Thank you

## 2020-08-13 ENCOUNTER — OFFICE VISIT (OUTPATIENT)
Dept: FAMILY MEDICINE CLINIC | Age: 45
End: 2020-08-13
Payer: COMMERCIAL

## 2020-08-13 VITALS
BODY MASS INDEX: 38.19 KG/M2 | SYSTOLIC BLOOD PRESSURE: 114 MMHG | TEMPERATURE: 97.5 F | WEIGHT: 177 LBS | OXYGEN SATURATION: 98 % | HEART RATE: 71 BPM | DIASTOLIC BLOOD PRESSURE: 80 MMHG | HEIGHT: 57 IN

## 2020-08-13 LAB — HBA1C MFR BLD: 6.7 %

## 2020-08-13 PROCEDURE — 83036 HEMOGLOBIN GLYCOSYLATED A1C: CPT | Performed by: PHYSICIAN ASSISTANT

## 2020-08-13 PROCEDURE — 1036F TOBACCO NON-USER: CPT | Performed by: PHYSICIAN ASSISTANT

## 2020-08-13 PROCEDURE — G8427 DOCREV CUR MEDS BY ELIG CLIN: HCPCS | Performed by: PHYSICIAN ASSISTANT

## 2020-08-13 PROCEDURE — G8417 CALC BMI ABV UP PARAM F/U: HCPCS | Performed by: PHYSICIAN ASSISTANT

## 2020-08-13 PROCEDURE — 3044F HG A1C LEVEL LT 7.0%: CPT | Performed by: PHYSICIAN ASSISTANT

## 2020-08-13 PROCEDURE — 99214 OFFICE O/P EST MOD 30 MIN: CPT | Performed by: PHYSICIAN ASSISTANT

## 2020-08-13 PROCEDURE — 2022F DILAT RTA XM EVC RTNOPTHY: CPT | Performed by: PHYSICIAN ASSISTANT

## 2020-08-13 ASSESSMENT — PATIENT HEALTH QUESTIONNAIRE - PHQ9
SUM OF ALL RESPONSES TO PHQ QUESTIONS 1-9: 1
SUM OF ALL RESPONSES TO PHQ QUESTIONS 1-9: 1
1. LITTLE INTEREST OR PLEASURE IN DOING THINGS: 0
2. FEELING DOWN, DEPRESSED OR HOPELESS: 1
SUM OF ALL RESPONSES TO PHQ9 QUESTIONS 1 & 2: 1

## 2020-08-13 NOTE — PROGRESS NOTES
SAW Stroud   EQ ALLERGY RELIEF 10 MG tablet TAKE 1 TABLET BY MOUTH ONCE DAILY  Anika Stroud PA-C   fluticasone (FLONASE) 50 MCG/ACT nasal spray USE 1 SPRAY(S) IN EACH NOSTRIL ONCE DAILY  Anika Stroud PA-C   tiZANidine (ZANAFLEX) 4 MG tablet TAKE 1 TABLET BY MOUTH THREE TIMES DAILY  Anika Stroud PA-C   triamterene-hydrochlorothiazide (MAXZIDE-25) 37.5-25 MG per tablet TAKE ONE TABLET BY MOUTH ONCE DAILY  Anika Stroud PA-C   omeprazole (PRILOSEC) 20 MG delayed release capsule TAKE ONE CAPSULE BY MOUTH TWICE DAILY  Anika Stroud PA-C   Lancets MISC Test BID. DX E11.8 NIDDM  Anika Stroud PA-C   desoximetasone (TOPICORT) 0.05 % OINT oitment Apply to affected area twice daily as needed  Anika Stroud PA-C   Skin Protectants, Misc. (HYDROCERIN) CREA cream Apply topically 2 times daily  Anika Stroud PA-C   glucose monitoring kit (FREESTYLE) monitoring kit 1 kit by Does not apply route daily DX E11.8 NIDDM  Anika Stroud PA-C   nabumetone (RELAFEN) 500 MG tablet Take 1 tablet by mouth 2 times daily  Anika Stroud PA-C        Social History     Tobacco Use    Smoking status: Never Smoker    Smokeless tobacco: Never Used   Substance Use Topics    Alcohol use: No        Vitals:    08/13/20 0956   BP: 114/80   Site: Left Upper Arm   Pulse: 71   Temp: 97.5 °F (36.4 °C)   TempSrc: Oral   SpO2: 98%   Weight: 177 lb (80.3 kg)   Height: 4' 9\" (1.448 m)     Estimated body mass index is 38.3 kg/m² as calculated from the following:    Height as of this encounter: 4' 9\" (1.448 m). Weight as of this encounter: 177 lb (80.3 kg). Physical Exam  Constitutional:       General: She is not in acute distress. Appearance: She is obese. She is not ill-appearing. HENT:      Head: Normocephalic and atraumatic. Right Ear: There is impacted cerumen. Left Ear: There is impacted cerumen.    Eyes:      Conjunctiva/sclera: Conjunctivae normal.      Pupils: Pupils are equal, round, and reactive to light. Comments: strabismus   Neck:      Musculoskeletal: Normal range of motion and neck supple. Thyroid: No thyromegaly. Cardiovascular:      Rate and Rhythm: Normal rate and regular rhythm. Heart sounds: Normal heart sounds. No murmur. Pulmonary:      Effort: Pulmonary effort is normal. No respiratory distress. Breath sounds: Normal breath sounds. No wheezing. Abdominal:      General: Bowel sounds are normal.      Palpations: Abdomen is soft. There is no mass. Tenderness: There is no abdominal tenderness. There is no guarding. Musculoskeletal: Normal range of motion. Right lower leg: No edema. Left lower leg: No edema. Lymphadenopathy:      Cervical: No cervical adenopathy. Skin:     General: Skin is warm and dry. Coloration: Skin is pale. Skin is not jaundiced. Neurological:      General: No focal deficit present. Mental Status: She is alert and oriented to person, place, and time. Cranial Nerves: No cranial nerve deficit. Motor: No weakness. Coordination: Coordination normal.      Gait: Gait normal.   Psychiatric:         Mood and Affect: Mood normal.         Behavior: Behavior normal.         Thought Content: Thought content normal.         Judgment: Judgment normal.              Assessment & Plan    Diagnosis Orders   1. Type 2 diabetes mellitus without complication, without long-term current use of insulin (HCC)  POCT glycosylated hemoglobin (Hb A1C)    a1c 6.7 - limit carbs   2. Bilateral impacted cerumen     3. Acquired hypothyroidism     4. Fatigue, unspecified type     5.  Screening mammogram, encounter for  LOU DIGITAL SCREEN W OR WO CAD BILATERAL         Orders Placed This Encounter   Procedures    LOU DIGITAL SCREEN W OR WO CAD BILATERAL     Standing Status:   Future     Standing Expiration Date:   10/13/2021    POCT glycosylated hemoglobin (Hb A1C)     No orders of the defined types were placed in this encounter. An electronic signature was used to authenticate this note.     --Anika Stroud PA-C on 8/13/2020 at 10:28 AM

## 2020-12-21 ENCOUNTER — HOSPITAL ENCOUNTER (INPATIENT)
Age: 45
LOS: 5 days | Discharge: HOME HEALTH | DRG: 427 | End: 2020-12-26
Attending: EMERGENCY MEDICINE | Admitting: INTERNAL MEDICINE
Payer: COMMERCIAL

## 2020-12-21 ENCOUNTER — APPOINTMENT (OUTPATIENT)
Dept: CT IMAGING | Age: 45
DRG: 427 | End: 2020-12-21
Payer: COMMERCIAL

## 2020-12-21 PROBLEM — G43.809 MIGRAINE VARIANT: Status: ACTIVE | Noted: 2020-12-21

## 2020-12-21 LAB
ALBUMIN SERPL-MCNC: 4.9 G/DL (ref 3.5–4.6)
ALP BLD-CCNC: 61 U/L (ref 40–130)
ALT SERPL-CCNC: 16 U/L (ref 0–33)
ANION GAP SERPL CALCULATED.3IONS-SCNC: 10 MEQ/L (ref 9–15)
AST SERPL-CCNC: 30 U/L (ref 0–35)
BASOPHILS ABSOLUTE: 0.1 K/UL (ref 0–0.2)
BASOPHILS RELATIVE PERCENT: 1.1 %
BILIRUB SERPL-MCNC: 0.4 MG/DL (ref 0.2–0.7)
BUN BLDV-MCNC: 12 MG/DL (ref 6–20)
CALCIUM SERPL-MCNC: 8.9 MG/DL (ref 8.5–9.9)
CHLORIDE BLD-SCNC: 94 MEQ/L (ref 95–107)
CO2: 28 MEQ/L (ref 20–31)
CREAT SERPL-MCNC: 1.08 MG/DL (ref 0.5–0.9)
EKG ATRIAL RATE: 55 BPM
EKG P AXIS: 40 DEGREES
EKG P-R INTERVAL: 184 MS
EKG Q-T INTERVAL: 534 MS
EKG QRS DURATION: 102 MS
EKG QTC CALCULATION (BAZETT): 510 MS
EKG R AXIS: -53 DEGREES
EKG T AXIS: 180 DEGREES
EKG VENTRICULAR RATE: 55 BPM
EOSINOPHILS ABSOLUTE: 0.1 K/UL (ref 0–0.7)
EOSINOPHILS RELATIVE PERCENT: 1.2 %
GFR AFRICAN AMERICAN: >60
GFR NON-AFRICAN AMERICAN: 54.4
GLOBULIN: 2.9 G/DL (ref 2.3–3.5)
GLUCOSE BLD-MCNC: 116 MG/DL (ref 60–115)
GLUCOSE BLD-MCNC: 150 MG/DL (ref 60–115)
GLUCOSE BLD-MCNC: 176 MG/DL (ref 60–115)
GLUCOSE BLD-MCNC: 183 MG/DL (ref 70–99)
HBA1C MFR BLD: 6.8 % (ref 4.8–5.9)
HCT VFR BLD CALC: 37.6 % (ref 37–47)
HEMOGLOBIN: 12.8 G/DL (ref 12–16)
LYMPHOCYTES ABSOLUTE: 0.8 K/UL (ref 1–4.8)
LYMPHOCYTES RELATIVE PERCENT: 14.5 %
MAGNESIUM: 2.3 MG/DL (ref 1.7–2.4)
MCH RBC QN AUTO: 31.7 PG (ref 27–31.3)
MCHC RBC AUTO-ENTMCNC: 34 % (ref 33–37)
MCV RBC AUTO: 93.4 FL (ref 82–100)
MONOCYTES ABSOLUTE: 0.2 K/UL (ref 0.2–0.8)
MONOCYTES RELATIVE PERCENT: 4.1 %
NEUTROPHILS ABSOLUTE: 4.5 K/UL (ref 1.4–6.5)
NEUTROPHILS RELATIVE PERCENT: 79.1 %
PDW BLD-RTO: 15.2 % (ref 11.5–14.5)
PERFORMED ON: ABNORMAL
PLATELET # BLD: 319 K/UL (ref 130–400)
POTASSIUM REFLEX MAGNESIUM: 3.5 MEQ/L (ref 3.4–4.9)
RBC # BLD: 4.02 M/UL (ref 4.2–5.4)
SARS-COV-2, NAAT: NOT DETECTED
SODIUM BLD-SCNC: 132 MEQ/L (ref 135–144)
T4 FREE: <0.03 NG/DL (ref 0.84–1.68)
TOTAL PROTEIN: 7.8 G/DL (ref 6.3–8)
TSH REFLEX: 229.4 UIU/ML (ref 0.44–3.86)
WBC # BLD: 5.7 K/UL (ref 4.8–10.8)

## 2020-12-21 PROCEDURE — 96375 TX/PRO/DX INJ NEW DRUG ADDON: CPT

## 2020-12-21 PROCEDURE — 70450 CT HEAD/BRAIN W/O DYE: CPT

## 2020-12-21 PROCEDURE — 6370000000 HC RX 637 (ALT 250 FOR IP): Performed by: EMERGENCY MEDICINE

## 2020-12-21 PROCEDURE — 2580000003 HC RX 258: Performed by: INTERNAL MEDICINE

## 2020-12-21 PROCEDURE — 36415 COLL VENOUS BLD VENIPUNCTURE: CPT

## 2020-12-21 PROCEDURE — 93005 ELECTROCARDIOGRAM TRACING: CPT | Performed by: EMERGENCY MEDICINE

## 2020-12-21 PROCEDURE — 84439 ASSAY OF FREE THYROXINE: CPT

## 2020-12-21 PROCEDURE — 96374 THER/PROPH/DIAG INJ IV PUSH: CPT

## 2020-12-21 PROCEDURE — G0378 HOSPITAL OBSERVATION PER HR: HCPCS

## 2020-12-21 PROCEDURE — 84443 ASSAY THYROID STIM HORMONE: CPT

## 2020-12-21 PROCEDURE — 99285 EMERGENCY DEPT VISIT HI MDM: CPT

## 2020-12-21 PROCEDURE — U0002 COVID-19 LAB TEST NON-CDC: HCPCS

## 2020-12-21 PROCEDURE — 83735 ASSAY OF MAGNESIUM: CPT

## 2020-12-21 PROCEDURE — 6360000002 HC RX W HCPCS: Performed by: INTERNAL MEDICINE

## 2020-12-21 PROCEDURE — 6360000002 HC RX W HCPCS: Performed by: EMERGENCY MEDICINE

## 2020-12-21 PROCEDURE — 1210000000 HC MED SURG R&B

## 2020-12-21 PROCEDURE — 2580000003 HC RX 258: Performed by: EMERGENCY MEDICINE

## 2020-12-21 PROCEDURE — 83036 HEMOGLOBIN GLYCOSYLATED A1C: CPT

## 2020-12-21 PROCEDURE — 80053 COMPREHEN METABOLIC PANEL: CPT

## 2020-12-21 PROCEDURE — 85025 COMPLETE CBC W/AUTO DIFF WBC: CPT

## 2020-12-21 RX ORDER — ACETAMINOPHEN 325 MG/1
650 TABLET ORAL EVERY 6 HOURS PRN
Status: DISCONTINUED | OUTPATIENT
Start: 2020-12-21 | End: 2020-12-26 | Stop reason: HOSPADM

## 2020-12-21 RX ORDER — SODIUM CHLORIDE 0.9 % (FLUSH) 0.9 %
10 SYRINGE (ML) INJECTION EVERY 12 HOURS SCHEDULED
Status: DISCONTINUED | OUTPATIENT
Start: 2020-12-21 | End: 2020-12-26 | Stop reason: HOSPADM

## 2020-12-21 RX ORDER — PROCHLORPERAZINE EDISYLATE 5 MG/ML
10 INJECTION INTRAMUSCULAR; INTRAVENOUS EVERY 6 HOURS PRN
Status: DISCONTINUED | OUTPATIENT
Start: 2020-12-21 | End: 2020-12-26 | Stop reason: HOSPADM

## 2020-12-21 RX ORDER — LORAZEPAM 2 MG/ML
1 INJECTION INTRAMUSCULAR ONCE
Status: COMPLETED | OUTPATIENT
Start: 2020-12-21 | End: 2020-12-21

## 2020-12-21 RX ORDER — PROMETHAZINE HYDROCHLORIDE 12.5 MG/1
12.5 TABLET ORAL EVERY 6 HOURS PRN
Status: DISCONTINUED | OUTPATIENT
Start: 2020-12-21 | End: 2020-12-26 | Stop reason: HOSPADM

## 2020-12-21 RX ORDER — KETOROLAC TROMETHAMINE 30 MG/ML
30 INJECTION, SOLUTION INTRAMUSCULAR; INTRAVENOUS ONCE
Status: COMPLETED | OUTPATIENT
Start: 2020-12-21 | End: 2020-12-21

## 2020-12-21 RX ORDER — 0.9 % SODIUM CHLORIDE 0.9 %
500 INTRAVENOUS SOLUTION INTRAVENOUS ONCE
Status: COMPLETED | OUTPATIENT
Start: 2020-12-21 | End: 2020-12-21

## 2020-12-21 RX ORDER — ASPIRIN 325 MG
325 TABLET ORAL ONCE
Status: COMPLETED | OUTPATIENT
Start: 2020-12-21 | End: 2020-12-21

## 2020-12-21 RX ORDER — DEXTROSE MONOHYDRATE 50 MG/ML
100 INJECTION, SOLUTION INTRAVENOUS PRN
Status: DISCONTINUED | OUTPATIENT
Start: 2020-12-21 | End: 2020-12-26 | Stop reason: HOSPADM

## 2020-12-21 RX ORDER — SUMATRIPTAN 6 MG/.5ML
6 INJECTION, SOLUTION SUBCUTANEOUS ONCE
Status: DISCONTINUED | OUTPATIENT
Start: 2020-12-21 | End: 2020-12-22 | Stop reason: SDUPTHER

## 2020-12-21 RX ORDER — SODIUM CHLORIDE 0.9 % (FLUSH) 0.9 %
10 SYRINGE (ML) INJECTION PRN
Status: DISCONTINUED | OUTPATIENT
Start: 2020-12-21 | End: 2020-12-26 | Stop reason: HOSPADM

## 2020-12-21 RX ORDER — POLYETHYLENE GLYCOL 3350 17 G/17G
17 POWDER, FOR SOLUTION ORAL DAILY PRN
Status: DISCONTINUED | OUTPATIENT
Start: 2020-12-21 | End: 2020-12-26 | Stop reason: HOSPADM

## 2020-12-21 RX ORDER — ACETAMINOPHEN 650 MG/1
650 SUPPOSITORY RECTAL EVERY 6 HOURS PRN
Status: DISCONTINUED | OUTPATIENT
Start: 2020-12-21 | End: 2020-12-26 | Stop reason: HOSPADM

## 2020-12-21 RX ORDER — NICOTINE POLACRILEX 4 MG
15 LOZENGE BUCCAL PRN
Status: DISCONTINUED | OUTPATIENT
Start: 2020-12-21 | End: 2020-12-26 | Stop reason: HOSPADM

## 2020-12-21 RX ORDER — ONDANSETRON 2 MG/ML
4 INJECTION INTRAMUSCULAR; INTRAVENOUS EVERY 6 HOURS PRN
Status: DISCONTINUED | OUTPATIENT
Start: 2020-12-21 | End: 2020-12-26 | Stop reason: HOSPADM

## 2020-12-21 RX ORDER — DEXTROSE MONOHYDRATE 25 G/50ML
12.5 INJECTION, SOLUTION INTRAVENOUS PRN
Status: DISCONTINUED | OUTPATIENT
Start: 2020-12-21 | End: 2020-12-26 | Stop reason: HOSPADM

## 2020-12-21 RX ORDER — INSULIN GLARGINE 100 [IU]/ML
15 INJECTION, SOLUTION SUBCUTANEOUS NIGHTLY
Status: DISCONTINUED | OUTPATIENT
Start: 2020-12-21 | End: 2020-12-22

## 2020-12-21 RX ADMIN — PROCHLORPERAZINE EDISYLATE 10 MG: 5 INJECTION INTRAMUSCULAR; INTRAVENOUS at 11:19

## 2020-12-21 RX ADMIN — SODIUM CHLORIDE 500 ML: 9 INJECTION, SOLUTION INTRAVENOUS at 11:19

## 2020-12-21 RX ADMIN — ASPIRIN 325 MG: 325 TABLET, FILM COATED ORAL at 13:08

## 2020-12-21 RX ADMIN — Medication 10 ML: at 20:39

## 2020-12-21 RX ADMIN — ENOXAPARIN SODIUM 40 MG: 100 INJECTION SUBCUTANEOUS at 20:39

## 2020-12-21 RX ADMIN — LORAZEPAM 1 MG: 2 INJECTION INTRAMUSCULAR; INTRAVENOUS at 13:08

## 2020-12-21 RX ADMIN — KETOROLAC TROMETHAMINE 30 MG: 30 INJECTION, SOLUTION INTRAMUSCULAR at 11:18

## 2020-12-21 ASSESSMENT — PAIN DESCRIPTION - DESCRIPTORS
DESCRIPTORS: ACHING
DESCRIPTORS: ACHING

## 2020-12-21 ASSESSMENT — PAIN SCALES - GENERAL
PAINLEVEL_OUTOF10: 10
PAINLEVEL_OUTOF10: 5
PAINLEVEL_OUTOF10: 0
PAINLEVEL_OUTOF10: 10
PAINLEVEL_OUTOF10: 0

## 2020-12-21 ASSESSMENT — ENCOUNTER SYMPTOMS
SORE THROAT: 0
WHEEZING: 0
RHINORRHEA: 0
PHOTOPHOBIA: 0
SHORTNESS OF BREATH: 0
COLOR CHANGE: 0
ABDOMINAL DISTENTION: 0
FACIAL SWELLING: 0
NAUSEA: 1
DIARRHEA: 0
VOMITING: 1
EYE DISCHARGE: 0
COUGH: 0
ABDOMINAL PAIN: 0

## 2020-12-21 ASSESSMENT — PAIN DESCRIPTION - PAIN TYPE
TYPE: ACUTE PAIN
TYPE: ACUTE PAIN

## 2020-12-21 ASSESSMENT — PAIN DESCRIPTION - FREQUENCY
FREQUENCY: CONTINUOUS
FREQUENCY: CONTINUOUS

## 2020-12-21 ASSESSMENT — PAIN DESCRIPTION - ORIENTATION: ORIENTATION: ANTERIOR

## 2020-12-21 ASSESSMENT — PAIN DESCRIPTION - LOCATION
LOCATION: HEAD
LOCATION: HEAD

## 2020-12-21 NOTE — ED PROVIDER NOTES
[facility] ED  eMERGENCY dEPARTMENT eNCOUnter      Pt Name: Kulwant Roe  MRN: [id number]  [place] 1975  Date of evaluation: 12/21/2020  Provider: Da Vega [address] [place] [address]       Chief Complaint   Patient presents with    Headache         HISTORY OF PRESENT ILLNESS   (Location/Symptom, Timing/Onset,Context/Setting, Quality, Duration, Modifying Factors, Severity)  Note limiting factors. Kulwant Roe is a 50 y.o. female who presents to the emergency department with a chief complaint of headache. She complains of a frontal headache with onset last night gradually worsening. No head injury. She feels that her vision is slightly blurred on the right side and that her vertigo got kicked in \"full force\". She went to bed and woke up with the same headache and when she got up she was \"walking drunk\". She does state that she has been under a large amount of stress lately financially and otherwise and though she has had vertigo and headaches in the past, they have never been this bad and usually resolves when she was able to have a good night sleep. She did not attempt to take any medicine for it. She has been vomiting because the headache has hurt her so badly. She does state that she has had allergies lately and an uptick in bloody noses for which she was requesting an ENT appointment through her doctor. She feels that her right side of her face in her right upper extremity feel kind of tingly compared to the rest of her body. She denies severe migraine history reporting just history of mild headaches off and on. She states that her vertigo has never been this bad nor had any visual or numbness deficits associated with it. She denies throbbing nature of her head or tenderness to the touch. She denies any sensitivity to light or sound. HPI    NursingNotes were reviewed.     REVIEW OF SYSTEMS    (2-9 systems for level 4, 10 or more for level 5)     Review of Systems Constitutional: Negative for activity change and appetite change. HENT: Negative for congestion, facial swelling, rhinorrhea and sore throat. Eyes: Positive for visual disturbance. Negative for photophobia and discharge. Respiratory: Negative for cough, shortness of breath and wheezing. Cardiovascular: Negative for chest pain and leg swelling. Gastrointestinal: Positive for nausea and vomiting. Negative for abdominal distention, abdominal pain and diarrhea. Endocrine: Negative for polydipsia and polyphagia. Genitourinary: Negative for difficulty urinating, frequency, vaginal bleeding and vaginal discharge. Musculoskeletal: Negative for gait problem and myalgias. Skin: Negative for color change. Allergic/Immunologic: Negative for immunocompromised state. Neurological: Positive for dizziness and headaches. Negative for weakness and light-headedness. Hematological: Negative for adenopathy. Psychiatric/Behavioral: Negative for behavioral problems. The patient is nervous/anxious. Except as noted above the remainder of the review of systems was reviewed and negative. PAST MEDICAL HISTORY     Past Medical History:   Diagnosis Date    Asthma     Depression     Endometriosis 03/2017    Hypothyroidism     Postmenopausal 2008    Type II diabetes mellitus, uncontrolled ([facility] [address]          SURGICALHISTORY       Past Surgical History:   Procedure Laterality Date    DENTAL SURGERY      ENDOMETRIAL BIOPSY  03/2017    Alice lala    INNER EAR SURGERY Right 1993    ear perforation    ROTATOR CUFF REPAIR Left 12/12/2014    TONSILLECTOMY AND ADENOIDECTOMY      TYMPANOSTOMY TUBE PLACEMENT           CURRENT MEDICATIONS       Previous Medications    BLOOD GLUCOSE TEST STRIPS (ASCENSIA AUTODISC VI;ONE TOUCH ULTRA TEST VI) STRIP    Test BID.   DX E11.8 NIDDM    DESOXIMETASONE (TOPICORT) 0.05 % OINT OITMENT    Apply to affected area twice daily as needed  Years of education: None    Highest education level: None   Occupational History    None   Social Needs    Financial resource strain: None    Food insecurity     Worry: None     Inability: None    Transportation needs     Medical: None     Non-medical: None   Tobacco Use    Smoking status: Never Smoker    Smokeless tobacco: Never Used   Substance and Sexual Activity    Alcohol use: No    Drug use: No    Sexual activity: Yes   Lifestyle    Physical activity     Days per week: None     Minutes per session: None    Stress: None   Relationships    Social connections     Talks on phone: None     Gets together: None     Attends Anabaptist service: None     Active member of club or organization: None     Attends meetings of clubs or organizations: None     Relationship status: None    Intimate partner violence     Fear of current or ex partner: None     Emotionally abused: None     Physically abused: None     Forced sexual activity: None   Other Topics Concern    None   Social History Narrative    None       SCREENINGS   NIH Stroke Scale  NIH Stroke Scale Assessed: Yes  Interval: Baseline  Level of Consciousness (1a. ): Alert  LOC Questions (1b. ):  Answers both correctly  LOC Commands (1c. ): Performs both tasks correctly  Best Gaze (2. ): Normal  Visual (3. ): (!) Partial hemianopia  Facial Palsy (4. ): Normal symmetrical movement  Motor Arm, Left (5a. ): No drift  Motor Arm, Right (5b. ): No drift  Motor Leg, Left (6a. ): No drift  Motor Leg, Right (6b. ): No drift  Limb Ataxia (7. ): (!) Present in one limb  Sensory (8. ): (!) Mild to Moderate  Best Language (9. ): No aphasia  Dysarthria (10. ): Normal  Extinction and Inattention (11): No abnormality  Total: 3Glasgow Coma Scale  Eye Opening: Spontaneous  Best Verbal Response: Oriented  Best Motor Response: Obeys commands  Shital Coma Scale Score: 15 @FLOW([id number])@      PHYSICAL EXAM    (up to 7 for level 4, 8 or more for level 5) ED Triage Vitals [12/21/20 1032]   BP Temp Temp Source Pulse Resp SpO2 Height Weight   (!) 169/89 97.9 °F (36.6 °C) Temporal 55 18 100 % 4' 9\" (1.448 m) 200 lb (90.7 kg)       Physical Exam  Constitutional:       General: She is in acute distress. Appearance: She is not ill-appearing or toxic-appearing. HENT:      Head: Normocephalic and atraumatic. Right Ear: Ear canal normal.      Left Ear: Ear canal normal.      Ears:      Comments: Tm's occluded by wax     Nose:      Comments: Left sided boggy turbinate     Mouth/Throat:      Pharynx: No oropharyngeal exudate or posterior oropharyngeal erythema. Eyes:      Extraocular Movements: Extraocular movements intact. Pupils: Pupils are equal, round, and reactive to light. Comments: Positive rightward nystagmus   Neck:      Musculoskeletal: No neck rigidity or muscular tenderness. Cardiovascular:      Rate and Rhythm: Bradycardia present. Heart sounds: No murmur. No friction rub. No gallop. Pulmonary:      Breath sounds: No wheezing. Abdominal:      Tenderness: There is no abdominal tenderness. Musculoskeletal:      Right lower leg: No edema. Left lower leg: No edema. Lymphadenopathy:      Cervical: No cervical adenopathy. Skin:     Findings: No bruising or rash. Neurological:      General: No focal deficit present. Mental Status: She is alert and oriented to person, place, and time. Cranial Nerves: No cranial nerve deficit. Motor: No weakness. Psychiatric:         Mood and Affect: Mood normal.         DIAGNOSTIC RESULTS     EKG: All EKG's are interpreted by the Emergency Department Physician who either signs or Co-signsthis chart in the absence of a cardiologist.    Sinus bradycardia at 55 bpm with T wave inversions in most leads; there is no old EKG for comparison. Patient is ranging from 47-55 on cardiac monitoring.     RADIOLOGY: Non-plain filmimages such as CT, Ultrasound and MRI are read by the radiologist. Plain radiographic images are visualized and preliminarily interpreted by the emergency physician with the below findings:        Interpretation per the Radiologist below, if available at the time ofthis note:    CT Head WO Contrast   Final Result      No acute intracranial process. Paranasal sinus disease. ED BEDSIDE ULTRASOUND:   Performed by ED Physician - none    LABS:  Labs Reviewed   POCT GLUCOSE - Abnormal; Notable for the following components:       Result Value    POC Glucose 176 (*)     All other components within normal limits       All other labs were within normal range or not returned as of this dictation. EMERGENCY DEPARTMENT COURSE and DIFFERENTIAL DIAGNOSIS/MDM:   Vitals:    Vitals:    12/21/20 1032 12/21/20 1241   BP: (!) 169/89 (!) 162/92   Pulse: 55 51   Resp: 18 18   Temp: 97.9 °F (36.6 °C)    TempSrc: Temporal    SpO2: 100% 96%   Weight: 200 lb (90.7 kg)    Height: 4' 9\" (1.448 m)        Patient feels somewhat better after IV therapy and being reassured that her head CT is negative. There is concern for CVA however given her vision deficits and subjective complaints of numbness that her right face and right upper extremity. After being medicated patient is attempted to be ambulated and practically face plants secondary to ataxia. She is given an aspirin and invited to stay in the hospital to control her vertigo and have further work-up. Patient has 2040 visual acuity in the right eye and 20/20 in the left per my bedside assessment initially. She reports improvement of the visual loss at reevaluation prior to attempt of ambulation however she remains 20/40 on exam.  She feels like she is able to open her eye better secondary to the pain being resolved. She is given a dose of IV Ativan to help with the vertigo even more while awaiting admission.     MDM    CRITICAL CARE TIME Total Critical Care time was 0 minutes, excluding separately reportableprocedures. There was a high probability of clinicallysignificant/life threatening deterioration in the patient's condition which required my urgent intervention. CONSULTS:  None    PROCEDURES:  Unless otherwise noted below, none     Procedures    FINAL IMPRESSION      1. Other headache syndrome    2. Benign paroxysmal positional vertigo, unspecified laterality    3. Ataxia          DISPOSITION/PLAN   DISPOSITION        PATIENT REFERRED TO:  No follow-up provider specified.     DISCHARGE MEDICATIONS:  New Prescriptions    No medications on file          (Please note that portions of this note were completed with a voice recognition program.  Efforts were made to edit the dictations but occasionally words are mis-transcribed.)    Susan Dubois DO (electronically signed)  Attending Emergency Physician          Susan Dubois DO  12/21/20 192 Roc Winters DO  12/21/20 1325

## 2020-12-21 NOTE — ED NOTES
Attempted to get pt out of bed to ambulate and pt took one step and nearly fell foreward. Pt placed back into bed. Sriram Rosa Maria Bethtense Revecyn  12/21/20 0857

## 2020-12-21 NOTE — FLOWSHEET NOTE
Pt arrived to floor and ambulated with stready gait into the bed. Pt very sleepy form medication given to her in the ER but does arouse when woken up. Pt denies pain at this time and does not feel like eating at this time. Pt states she takes a thyroid medication at home. Pt has a cell phone with her but refuses to remove sweat pants states she is cold. Pt does not have any teeth but has dentures but does not have them with her. Electronically signed by Liliana Rhoades LPN on 34/20/1557 at 3:32 PM

## 2020-12-21 NOTE — ED NOTES
States pain is gone. Resp even and unlabored. Drowsy, but easily arousable. Galina Anand RN  12/21/20 9451

## 2020-12-21 NOTE — H&P
Hospitalist History and Physical Note  Name: Shanice Dong  Age: 50 y.o. Gender: female  CodeStatus: No Order  Allergies: No Known Allergies    Chief Complaint:Headache    Primary Care Provider: Niharika Hayden PA-C  InpatientTreatment Team: Treatment Team: Attending Provider: Abundio Issa DO; LPN: Sarahi Gao LPN; Patient Care Tech: Corazon [facility]; [facility] Care [facility]: [facility] [place] [facility]  Admission Date: 12/21/2020      Subjective:  73-year-old female with pmhx of DM type II, hypothyroidism, depression, vertigo and asthma presented to the ED for c/o frontal headache. Reports last night the headache gradually worsened. Reports blurred vision to R side. States she went to bed and woke up with the same headache. Denies any recent head injuries. Reports she gets headaches about 3 times per week. Does not take anything for headaches and it \"goes away on its own\". She reports emesis with this headache. Did not take anything for the headache. Reports R side of face and upper extremity feel tingly. Reports history of vertigo but states she has not had symptoms like this. Upon arrival to the ED she had CT scan that was negative for acute process. Just paranasal sinus disease. She was given ASA, toradol, imitrex, and ativan in the ED. Reports her vision improved after these medications. Upon exam she was lethargic but easily to arouse. Alert and oriented. Reports her headahce has subsided. Aware of admission and plan of care      Physical Exam  Constitutional:       Appearance: She is overweight. HENT:      Right Ear: External ear normal.      Left Ear: External ear normal.      Nose: Nose normal.      Mouth/Throat:      Mouth: Mucous membranes are dry. Pharynx: Oropharynx is clear. Eyes:      Pupils: Pupils are equal, round, and reactive to light. Comments: 4mm and reactive bilaterally   Neck:      Musculoskeletal: Normal range of motion and neck supple.    Cardiovascular:      Rate and Rhythm: Normal rate and regular rhythm. Heart sounds: Normal heart sounds. Pulmonary:      Effort: Pulmonary effort is normal.      Breath sounds: Normal breath sounds. Abdominal:      General: Bowel sounds are normal.      Palpations: Abdomen is soft. Musculoskeletal: Normal range of motion. Skin:     General: Skin is warm and dry. Capillary Refill: Capillary refill takes less than 2 seconds. Neurological:      General: No focal deficit present. Mental Status: She is oriented to person, place, and time and easily aroused. She is lethargic. Psychiatric:         Mood and Affect: Mood normal.         Review of Systems   Gastrointestinal: Positive for nausea and vomiting. Neurological: Positive for headaches. All other systems reviewed and are negative.       Past Medical History:   Diagnosis Date    Asthma     Depression     Endometriosis 03/2017    Hypothyroidism     Postmenopausal 2008    Type II diabetes mellitus, uncontrolled ([address]      Past Surgical History:   Procedure Laterality Date    DENTAL SURGERY      ENDOMETRIAL BIOPSY  03/2017    Alice lala    INNER EAR SURGERY Right 1993    ear perforation    ROTATOR CUFF REPAIR Left 12/12/2014    TONSILLECTOMY AND ADENOIDECTOMY      TYMPANOSTOMY TUBE PLACEMENT       Social History     Tobacco History     Smoking Status  Never Smoker    Smokeless Tobacco Use  Never Used          Alcohol History     Alcohol Use Status  No          Drug Use     Drug Use Status  No          Sexual Activity     Sexually Active  Yes               Family History   Problem Relation Age of Onset    Depression Mother     Diabetes Maternal Aunt     Diabetes Maternal Uncle     Asthma Maternal Grandmother     Cancer Maternal Grandmother         lung    High Blood Pressure Maternal Grandmother     High Blood Pressure Maternal Grandfather     Stroke Maternal Grandfather          Medications:  Reviewed  Prior to Admission medications    Medication Sig Start Date End Date Taking? Authorizing Provider   fluticasone (FLONASE) 50 MCG/ACT nasal spray 1 spray by Each Nostril route daily 8/6/20   Anika Stroud PA-C   levothyroxine (LEVOTHROID) 150 MCG tablet Take 1 tablet by mouth daily 8/6/20   Anika Stroud PA-C   ibuprofen (ADVIL;MOTRIN) 800 MG tablet TAKE 1 TABLET BY MOUTH EVERY 6 HOURS AS NEEDED FOR PAIN 4/16/20   Anika Stroud PA-C   simvastatin (ZOCOR) 80 MG tablet TAKE ONE TABLET BY MOUTH ONCE DAILY IN THE EVENING 4/15/20   Anika Stroud PA-C   metFORMIN (GLUCOPHAGE) 1000 MG tablet TAKE ONE TABLET BY MOUTH ONCE DAILY WITH  BREAKFAST 4/15/20   Anika Stroud PA-C   loratadine (CLARITIN) 10 MG tablet Take 1 tablet by mouth daily 4/15/20   Anika Stroud PA-C   glucose monitoring kit (FREESTYLE) monitoring kit 1 kit by Does not apply route daily 4/15/20   Anika Stroud PA-C   blood glucose test strips (ASCENSIA AUTODISC VI;ONE TOUCH ULTRA TEST VI) strip Test BID. DX E11.8 NIDDM 4/15/20   Anika Stroud PA-C   EQ ALLERGY RELIEF 10 MG tablet TAKE 1 TABLET BY MOUTH ONCE DAILY 2/17/20   Anika Stroud PA-C   fluticasone (FLONASE) 50 MCG/ACT nasal spray USE 1 SPRAY(S) IN EACH NOSTRIL ONCE DAILY 2/17/20   Anika Stroud PA-C   tiZANidine (ZANAFLEX) 4 MG tablet TAKE 1 TABLET BY MOUTH THREE TIMES DAILY 1/28/19   Anika Stroud PA-C   triamterene-hydrochlorothiazide (MAXZIDE-25) 37.5-25 MG per tablet TAKE ONE TABLET BY MOUTH ONCE DAILY 1/22/19   Anika Stroud PA-C   omeprazole (PRILOSEC) 20 MG delayed release capsule TAKE ONE CAPSULE BY MOUTH TWICE DAILY 1/22/19   Anika Stroud PA-C   Lancets MISC Test BID. DX E11.8 NIDDM 1/22/19   Anika Stroud PA-C   desoximetasone (TOPICORT) 0.05 % OINT oitment Apply to affected area twice daily as needed 1/22/19   Anika Stroud PA-C   Skin Protectants, Misc.  (HYDROCERIN) CREA cream Apply topically 2 times daily 1/22/19 Ainka Stroud PA-C   glucose monitoring kit (FREESTYLE) monitoring kit 1 kit by Does not apply route daily DX E11.8 NIDDM 3/13/18   Anika Stroud PA-C   nabumetone (RELAFEN) 500 MG tablet Take 1 tablet by mouth 2 times daily 3/8/18   Anika Stroud PA-C       Current Facility-Administered Medications   Medication Dose Route Frequency Provider Last Rate Last Admin    prochlorperazine (COMPAZINE) injection 10 mg  10 mg Intravenous Q6H PRN Romepiyushe Sandra, DO   10 mg at 12/21/20 1119    SUMAtriptan (IMITREX) injection 6 mg  6 mg Subcutaneous Once Doyle Credit, DO            Infusion Medications:   Scheduled Medications:    SUMAtriptan  6 mg Subcutaneous Once     PRN Meds: prochlorperazine    Labs:   Recent Labs     12/21/20  1330   WBC 5.7   HGB 12.8   HCT 37.6        Recent Labs     12/21/20  1330   *   K 3.5   CL 94*   CO2 28   BUN 12   CREATININE 1.08*   CALCIUM 8.9     Recent Labs     12/21/20  1330   AST 30   ALT 16   BILITOT 0.4   ALKPHOS 61     No results for input(s): INR in the last 72 hours. No results for input(s): Connee Matar in the last 72 hours. Urinalysis:   No results found for: Alyne Claw, BACTERIA, RBCUA, BLOODU, [address], [address]    Radiology:   Most recent    Chest CT      WITH CONTRAST:No results found for this or any previous visit. WITHOUT CONTRAST: No results found for this or any previous visit. CXR      2-view: No results found for this or any previous visit. Portable: No results found for this or any previous visit. Echo No results found for this or any previous visit.           Assessment/Plan:    Active Problems:    Migraine variant: Consult neuro, continue neuro checks, continue imitrex, tylenol  DM Type II: SSI, hypoglycemia protocol, check A1c  DVT prop: lovenox  Hypothyroidism: Check TSH  Active Hospital Problems    Diagnosis Date Noted    Migraine variant [G43.809] 12/21/2020       Additional work up or/and treatment plan may be added today or then after based on clinical progression. I am managing a portion of pt care. Some medical issues are handled byother specialists. Additional work up and treatment should be done in out pt setting by pt PCP and other out pt providers. In addition to examining and evaluating pt, I spent additional time explaining care, normaland abnormal findings, and treatment plan. All of pt questions were answered. Counseling, diet and education were provided. Case will be discussed with nursing staff when appropriate. Family will be updated if and whenappropriate.       Electronically signed by YANELY Barker NP on 12/21/2020 at 3:37 PM

## 2020-12-21 NOTE — ED PROVIDER NOTES
[facility] [facility] ED  eMERGENCY dEPARTMENT eNCOUnter      Pt Name: Clau Duff  MRN: [id number]  [place] 1975  Date of evaluation: 12/21/2020  [facility] Flores [facility]       Chief Complaint   Patient presents with    Headache         HISTORY OF PRESENT ILLNESS   (Location/Symptom, Timing/Onset,Context/Setting, Quality, Duration, Modifying Factors, Severity)  Note limiting factors. Clau Duff is a 50 y.o. female who presents to the emergency department with a chief complaint of headache. She complains of a frontal headache with onset last night gradually worsening. No head injury. She feels that her vision is slightly blurred on the right side and that her vertigo got kicked in \"full force\". She went to bed and woke up with the same headache and when she got up she was \"walking drunk\". She does state that she has been under a large amount of stress lately financially and otherwise and though she has had vertigo and headaches in the past, they have never been this bad and usually resolves when she was able to have a good night sleep. She did not attempt to take any medicine for it. She has been vomiting because the headache has hurt her so badly. She does state that she has had allergies lately and an uptick in bloody noses for which she was requesting an ENT appointment through her doctor. She feels that her right side of her face in her right upper extremity feel kind of tingly compared to the rest of her body. She denies severe migraine history reporting just history of mild headaches off and on. She states that her vertigo has never been this bad nor had any visual or numbness deficits associated with it. She denies throbbing nature of her head or tenderness to the touch. She denies any sensitivity to light or sound. HPI    NursingNotes were reviewed.     REVIEW OF SYSTEMS    (2-9 systems for level 4, 10 or more for level 5)     Review of Systems Constitutional: Negative for activity change and appetite change. HENT: Negative for congestion, facial swelling, rhinorrhea and sore throat. Eyes: Positive for visual disturbance. Negative for photophobia and discharge. Respiratory: Negative for cough, shortness of breath and wheezing. Cardiovascular: Negative for chest pain and leg swelling. Gastrointestinal: Positive for nausea and vomiting. Negative for abdominal distention, abdominal pain and diarrhea. Endocrine: Negative for polydipsia and polyphagia. Genitourinary: Negative for difficulty urinating, frequency, vaginal bleeding and vaginal discharge. Musculoskeletal: Negative for gait problem and myalgias. Skin: Negative for color change. Allergic/Immunologic: Negative for immunocompromised state. Neurological: Positive for dizziness and headaches. Negative for weakness and light-headedness. Hematological: Negative for adenopathy. Psychiatric/Behavioral: Negative for behavioral problems. The patient is nervous/anxious. Except as noted above the remainder of the review of systems was reviewed and negative. PAST MEDICAL HISTORY     Past Medical History:   Diagnosis Date    Asthma     Depression     Endometriosis 03/2017    Hypothyroidism     Postmenopausal 2008    Type II diabetes mellitus, uncontrolled ([facility] [address]          SURGICALHISTORY       Past Surgical History:   Procedure Laterality Date    DENTAL SURGERY      ENDOMETRIAL BIOPSY  03/2017    Alice lala    INNER EAR SURGERY Right 1993    ear perforation    ROTATOR CUFF REPAIR Left 12/12/2014    TONSILLECTOMY AND ADENOIDECTOMY      TYMPANOSTOMY TUBE PLACEMENT           CURRENT MEDICATIONS       Previous Medications    BLOOD GLUCOSE TEST STRIPS (ASCENSIA AUTODISC VI;ONE TOUCH ULTRA TEST VI) STRIP    Test BID.   DX E11.8 NIDDM    DESOXIMETASONE (TOPICORT) 0.05 % OINT OITMENT    Apply to affected area twice daily as needed    EQ ALLERGY RELIEF 10 MG TABLET    TAKE 1 TABLET BY MOUTH ONCE DAILY    FLUTICASONE (FLONASE) 50 MCG/ACT NASAL SPRAY    USE 1 SPRAY(S) IN EACH NOSTRIL ONCE DAILY    FLUTICASONE (FLONASE) 50 MCG/ACT NASAL SPRAY    1 spray by Each Nostril route daily    GLUCOSE MONITORING KIT (FREESTYLE) MONITORING KIT    1 kit by Does not apply route daily DX E11.8 NIDDM    GLUCOSE MONITORING KIT (FREESTYLE) MONITORING KIT    1 kit by Does not apply route daily    IBUPROFEN (ADVIL;MOTRIN) 800 MG TABLET    TAKE 1 TABLET BY MOUTH EVERY 6 HOURS AS NEEDED FOR PAIN    LANCETS MISC    Test BID. DX E11.8 NIDDM    LEVOTHYROXINE (LEVOTHROID) 150 MCG TABLET    Take 1 tablet by mouth daily    LORATADINE (CLARITIN) 10 MG TABLET    Take 1 tablet by mouth daily    METFORMIN (GLUCOPHAGE) 1000 MG TABLET    TAKE ONE TABLET BY MOUTH ONCE DAILY WITH  BREAKFAST    NABUMETONE (RELAFEN) 500 MG TABLET    Take 1 tablet by mouth 2 times daily    OMEPRAZOLE (PRILOSEC) 20 MG DELAYED RELEASE CAPSULE    TAKE ONE CAPSULE BY MOUTH TWICE DAILY    SIMVASTATIN (ZOCOR) 80 MG TABLET    TAKE ONE TABLET BY MOUTH ONCE DAILY IN THE EVENING    SKIN PROTECTANTS, MISC. (HYDROCERIN) CREA CREAM    Apply topically 2 times daily    TIZANIDINE (ZANAFLEX) 4 MG TABLET    TAKE 1 TABLET BY MOUTH THREE TIMES DAILY    TRIAMTERENE-HYDROCHLOROTHIAZIDE (MAXZIDE-25) 37.5-25 MG PER TABLET    TAKE ONE TABLET BY MOUTH ONCE DAILY       ALLERGIES     Patient has no known allergies.     FAMILY HISTORY       Family History   Problem Relation Age of Onset    Depression Mother     Diabetes Maternal Aunt     Diabetes Maternal Uncle     Asthma Maternal Grandmother     Cancer Maternal Grandmother         lung    High Blood Pressure Maternal Grandmother     High Blood Pressure Maternal Grandfather     Stroke Maternal Grandfather           SOCIAL HISTORY       Social History     Socioeconomic History    Marital status:      Spouse name: None    Number of children: None    Years of education: None    Highest education level: None   Occupational History    None   Social Needs    Financial resource strain: None    Food insecurity     Worry: None     Inability: None    Transportation needs     Medical: None     Non-medical: None   Tobacco Use    Smoking status: Never Smoker    Smokeless tobacco: Never Used   Substance and Sexual Activity    Alcohol use: No    Drug use: No    Sexual activity: Yes   Lifestyle    Physical activity     Days per week: None     Minutes per session: None    Stress: None   Relationships    Social connections     Talks on phone: None     Gets together: None     Attends Scientologist service: None     Active member of club or organization: None     Attends meetings of clubs or organizations: None     Relationship status: None    Intimate partner violence     Fear of current or ex partner: None     Emotionally abused: None     Physically abused: None     Forced sexual activity: None   Other Topics Concern    None   Social History Narrative    None       SCREENINGS   NIH Stroke Scale  NIH Stroke Scale Assessed: Yes  Interval: Baseline  Level of Consciousness (1a. ): Alert  LOC Questions (1b. ):  Answers both correctly  LOC Commands (1c. ): Performs both tasks correctly  Best Gaze (2. ): Normal  Visual (3. ): (!) Partial hemianopia  Facial Palsy (4. ): Normal symmetrical movement  Motor Arm, Left (5a. ): No drift  Motor Arm, Right (5b. ): No drift  Motor Leg, Left (6a. ): No drift  Motor Leg, Right (6b. ): No drift  Limb Ataxia (7. ): (!) Present in one limb  Sensory (8. ): (!) Mild to Moderate  Best Language (9. ): No aphasia  Dysarthria (10. ): Normal  Extinction and Inattention (11): No abnormality  Total: 3Glasgow Coma Scale  Eye Opening: Spontaneous  Best Verbal Response: Oriented  Best Motor Response: Obeys commands  [place] Coma Scale Score: 15 @FLOW([id number])@      PHYSICAL EXAM    (up to 7 for level 4, 8 or more for level 5)     ED Triage Vitals [12/21/20 1032]   BP Temp Temp Source Pulse Resp SpO2 Height Weight   (!) 169/89 97.9 °F (36.6 °C) Temporal 55 18 100 % 4' 9\" (1.448 m) 200 lb (90.7 kg)       Physical Exam  Constitutional:       General: She is in acute distress. Appearance: She is not ill-appearing or toxic-appearing. HENT:      Head: Normocephalic and atraumatic. Right Ear: Ear canal normal.      Left Ear: Ear canal normal.      Ears:      Comments: Tm's occluded by wax     Nose:      Comments: Left sided boggy turbinate     Mouth/Throat:      Pharynx: No oropharyngeal exudate or posterior oropharyngeal erythema. Eyes:      Extraocular Movements: Extraocular movements intact. Pupils: Pupils are equal, round, and reactive to light. Comments: Positive rightward nystagmus   Neck:      Musculoskeletal: No neck rigidity or muscular tenderness. Cardiovascular:      Rate and Rhythm: Bradycardia present. Heart sounds: No murmur. No friction rub. No gallop. Pulmonary:      Breath sounds: No wheezing. Abdominal:      Tenderness: There is no abdominal tenderness. Musculoskeletal:      Right lower leg: No edema. Left lower leg: No edema. Lymphadenopathy:      Cervical: No cervical adenopathy. Skin:     Findings: No bruising or rash. Neurological:      General: No focal deficit present. Mental Status: She is alert and oriented to person, place, and time. Cranial Nerves: No cranial nerve deficit. Motor: No weakness.    Psychiatric:         Mood and Affect: Mood normal.         DIAGNOSTIC RESULTS     EKG: All EKG's are interpreted by the Emergency Department Physician who either signs or Co-signsthis chart in the absence of a cardiologist.        RADIOLOGY:   Johnella Belling such as CT, Ultrasound and MRI are read by the radiologist. Plain radiographic images are visualized and preliminarily interpreted by the emergency physician with the below findings:        Interpretation per the Radiologist below, if available at the time ofthis note:    CT Head WO Contrast   Final Result      No acute intracranial process. Paranasal sinus disease. ED BEDSIDE ULTRASOUND:   Performed by ED Physician - none    LABS:  Labs Reviewed   POCT GLUCOSE - Abnormal; Notable for the following components:       Result Value    POC Glucose 176 (*)     All other components within normal limits       All other labs were within normal range or not returned as of this dictation. EMERGENCY DEPARTMENT COURSE and DIFFERENTIAL DIAGNOSIS/MDM:   Vitals:    Vitals:    12/21/20 1032 12/21/20 1241   BP: (!) 169/89 (!) 162/92   Pulse: 55 51   Resp: 18 18   Temp: 97.9 °F (36.6 °C)    TempSrc: Temporal    SpO2: 100% 96%   Weight: 200 lb (90.7 kg)    Height: 4' 9\" (1.448 m)        Patient feels somewhat better after IV therapy and being reassured that her head CT is negative. There is concern for CVA however given her vision deficits and subjective complaints of numbness that her right face and right upper extremity. After being medicated patient is attempted to be ambulated and practically face plants secondary to ataxia. She is given an aspirin and invited to stay in the hospital to control her vertigo and have further work-up. Patient has 2040 visual acuity in the right eye and 20/20 in the left per my bedside assessment initially. She reports improvement of the visual loss at reevaluation prior to attempt of ambulation however she remains 20/40 on exam.  She feels like she is able to open her eye better secondary to the pain being resolved. She is given a dose of IV Ativan to help with the vertigo even more while awaiting admission. MDM    CRITICAL CARE TIME   Total Critical Care time was 0 minutes, excluding separately reportableprocedures. There was a high probability of clinicallysignificant/life threatening deterioration in the patient's condition which required my urgent intervention.       CONSULTS:  None    PROCEDURES:  Unless otherwise noted below, none Procedures    FINAL IMPRESSION      1. Other headache syndrome    2. Benign paroxysmal positional vertigo, unspecified laterality    3. Ataxia          DISPOSITION/PLAN   DISPOSITION        PATIENT REFERRED TO:  No follow-up provider specified.     DISCHARGE MEDICATIONS:  New Prescriptions    No medications on file          (Please note that portions of this note were completed with a voice recognition program.  Efforts were made to edit the dictations but occasionally words are mis-transcribed.)    Marsha Mendiola DO (electronically signed)  Attending Emergency Physician          Marsha Mendiola DO  12/21/20 192 Roc Winters DO  12/21/20 1322

## 2020-12-22 ENCOUNTER — APPOINTMENT (OUTPATIENT)
Dept: CT IMAGING | Age: 45
DRG: 427 | End: 2020-12-22
Payer: COMMERCIAL

## 2020-12-22 ENCOUNTER — APPOINTMENT (OUTPATIENT)
Dept: MRI IMAGING | Age: 45
DRG: 427 | End: 2020-12-22
Payer: COMMERCIAL

## 2020-12-22 PROBLEM — E03.9 HYPOTHYROIDISM: Status: ACTIVE | Noted: 2020-12-22

## 2020-12-22 LAB
ANION GAP SERPL CALCULATED.3IONS-SCNC: 12 MEQ/L (ref 9–15)
BASOPHILS ABSOLUTE: 0 K/UL (ref 0–0.2)
BASOPHILS RELATIVE PERCENT: 0.7 %
BUN BLDV-MCNC: 12 MG/DL (ref 6–20)
C-REACTIVE PROTEIN, HIGH SENSITIVITY: 4.1 MG/L (ref 0–5)
CALCIUM SERPL-MCNC: 8.4 MG/DL (ref 8.5–9.9)
CHLORIDE BLD-SCNC: 95 MEQ/L (ref 95–107)
CHOLESTEROL, TOTAL: 343 MG/DL (ref 0–199)
CK MB: 7.1 NG/ML (ref 0–3.8)
CO2: 25 MEQ/L (ref 20–31)
CORTISOL - AM: 35.3 UG/DL (ref 6.2–19.4)
CREAT SERPL-MCNC: 0.98 MG/DL (ref 0.5–0.9)
CREATINE KINASE-MB INDEX: 1.1 % (ref 0–3.5)
EOSINOPHILS ABSOLUTE: 0.1 K/UL (ref 0–0.7)
EOSINOPHILS RELATIVE PERCENT: 1.7 %
FOLATE: 11.8 NG/ML (ref 7.3–26.1)
GFR AFRICAN AMERICAN: >60
GFR NON-AFRICAN AMERICAN: >60
GLUCOSE BLD-MCNC: 131 MG/DL (ref 60–115)
GLUCOSE BLD-MCNC: 136 MG/DL (ref 70–99)
GLUCOSE BLD-MCNC: 137 MG/DL (ref 60–115)
GLUCOSE BLD-MCNC: 151 MG/DL (ref 60–115)
GLUCOSE BLD-MCNC: 152 MG/DL (ref 60–115)
HCT VFR BLD CALC: 36.9 % (ref 37–47)
HDLC SERPL-MCNC: 31 MG/DL (ref 40–59)
HEMOGLOBIN: 12.3 G/DL (ref 12–16)
LDL CHOLESTEROL CALCULATED: 275 MG/DL (ref 0–129)
LYMPHOCYTES ABSOLUTE: 0.9 K/UL (ref 1–4.8)
LYMPHOCYTES RELATIVE PERCENT: 13.4 %
MAGNESIUM: 2.4 MG/DL (ref 1.7–2.4)
MCH RBC QN AUTO: 31.3 PG (ref 27–31.3)
MCHC RBC AUTO-ENTMCNC: 33.3 % (ref 33–37)
MCV RBC AUTO: 94 FL (ref 82–100)
MONOCYTES ABSOLUTE: 0.5 K/UL (ref 0.2–0.8)
MONOCYTES RELATIVE PERCENT: 7.1 %
NEUTROPHILS ABSOLUTE: 5.2 K/UL (ref 1.4–6.5)
NEUTROPHILS RELATIVE PERCENT: 77.1 %
PDW BLD-RTO: 15.4 % (ref 11.5–14.5)
PERFORMED ON: ABNORMAL
PLATELET # BLD: 300 K/UL (ref 130–400)
POTASSIUM REFLEX MAGNESIUM: 3.3 MEQ/L (ref 3.4–4.9)
PROLACTIN: 27.6 NG/ML
RBC # BLD: 3.93 M/UL (ref 4.2–5.4)
SEDIMENTATION RATE, ERYTHROCYTE: 42 MM (ref 0–20)
SODIUM BLD-SCNC: 132 MEQ/L (ref 135–144)
TOTAL CK: 638 U/L (ref 0–170)
TRIGL SERPL-MCNC: 185 MG/DL (ref 0–150)
TSH SERPL DL<=0.05 MIU/L-ACNC: 173.8 UIU/ML (ref 0.44–3.86)
VITAMIN B-12: 268 PG/ML (ref 232–1245)
WBC # BLD: 6.8 K/UL (ref 4.8–10.8)

## 2020-12-22 PROCEDURE — 82550 ASSAY OF CK (CPK): CPT

## 2020-12-22 PROCEDURE — 99222 1ST HOSP IP/OBS MODERATE 55: CPT | Performed by: INTERNAL MEDICINE

## 2020-12-22 PROCEDURE — 70496 CT ANGIOGRAPHY HEAD: CPT

## 2020-12-22 PROCEDURE — 86141 C-REACTIVE PROTEIN HS: CPT

## 2020-12-22 PROCEDURE — 99254 IP/OBS CNSLTJ NEW/EST MOD 60: CPT | Performed by: PSYCHIATRY & NEUROLOGY

## 2020-12-22 PROCEDURE — 82533 TOTAL CORTISOL: CPT

## 2020-12-22 PROCEDURE — 6370000000 HC RX 637 (ALT 250 FOR IP): Performed by: PSYCHIATRY & NEUROLOGY

## 2020-12-22 PROCEDURE — 6360000002 HC RX W HCPCS: Performed by: INTERNAL MEDICINE

## 2020-12-22 PROCEDURE — 6370000000 HC RX 637 (ALT 250 FOR IP): Performed by: INTERNAL MEDICINE

## 2020-12-22 PROCEDURE — 80061 LIPID PANEL: CPT

## 2020-12-22 PROCEDURE — 97166 OT EVAL MOD COMPLEX 45 MIN: CPT

## 2020-12-22 PROCEDURE — 70551 MRI BRAIN STEM W/O DYE: CPT

## 2020-12-22 PROCEDURE — 82553 CREATINE MB FRACTION: CPT

## 2020-12-22 PROCEDURE — 82746 ASSAY OF FOLIC ACID SERUM: CPT

## 2020-12-22 PROCEDURE — 1210000000 HC MED SURG R&B

## 2020-12-22 PROCEDURE — 6360000004 HC RX CONTRAST MEDICATION: Performed by: PSYCHIATRY & NEUROLOGY

## 2020-12-22 PROCEDURE — 85652 RBC SED RATE AUTOMATED: CPT

## 2020-12-22 PROCEDURE — 70498 CT ANGIOGRAPHY NECK: CPT

## 2020-12-22 PROCEDURE — 36415 COLL VENOUS BLD VENIPUNCTURE: CPT

## 2020-12-22 PROCEDURE — 84443 ASSAY THYROID STIM HORMONE: CPT

## 2020-12-22 PROCEDURE — 83735 ASSAY OF MAGNESIUM: CPT

## 2020-12-22 PROCEDURE — 84146 ASSAY OF PROLACTIN: CPT

## 2020-12-22 PROCEDURE — 85025 COMPLETE CBC W/AUTO DIFF WBC: CPT

## 2020-12-22 PROCEDURE — 86225 DNA ANTIBODY NATIVE: CPT

## 2020-12-22 PROCEDURE — 80048 BASIC METABOLIC PNL TOTAL CA: CPT

## 2020-12-22 PROCEDURE — 82607 VITAMIN B-12: CPT

## 2020-12-22 PROCEDURE — 2580000003 HC RX 258: Performed by: INTERNAL MEDICINE

## 2020-12-22 RX ORDER — ATORVASTATIN CALCIUM 40 MG/1
40 TABLET, FILM COATED ORAL DAILY
Refills: 3 | Status: DISCONTINUED | OUTPATIENT
Start: 2020-12-22 | End: 2020-12-26 | Stop reason: HOSPADM

## 2020-12-22 RX ORDER — CETIRIZINE HYDROCHLORIDE 10 MG/1
10 TABLET ORAL DAILY
Refills: 3 | Status: DISCONTINUED | OUTPATIENT
Start: 2020-12-22 | End: 2020-12-26 | Stop reason: HOSPADM

## 2020-12-22 RX ORDER — FLUTICASONE PROPIONATE 50 MCG
1 SPRAY, SUSPENSION (ML) NASAL DAILY
Status: DISCONTINUED | OUTPATIENT
Start: 2020-12-22 | End: 2020-12-26 | Stop reason: HOSPADM

## 2020-12-22 RX ORDER — LEVOTHYROXINE SODIUM 0.1 MG/1
300 TABLET ORAL DAILY
Status: DISCONTINUED | OUTPATIENT
Start: 2020-12-23 | End: 2020-12-26 | Stop reason: HOSPADM

## 2020-12-22 RX ORDER — LEVOTHYROXINE SODIUM 0.1 MG/1
200 TABLET ORAL DAILY
Status: DISCONTINUED | OUTPATIENT
Start: 2020-12-22 | End: 2020-12-22

## 2020-12-22 RX ORDER — SUMATRIPTAN 6 MG/.5ML
6 INJECTION, SOLUTION SUBCUTANEOUS ONCE
Status: COMPLETED | OUTPATIENT
Start: 2020-12-22 | End: 2020-12-22

## 2020-12-22 RX ORDER — MECLIZINE HYDROCHLORIDE 25 MG/1
25 TABLET ORAL 3 TIMES DAILY
Status: DISCONTINUED | OUTPATIENT
Start: 2020-12-22 | End: 2020-12-26 | Stop reason: HOSPADM

## 2020-12-22 RX ORDER — SUMATRIPTAN 100 MG/1
100 TABLET, FILM COATED ORAL 2 TIMES DAILY
Status: COMPLETED | OUTPATIENT
Start: 2020-12-22 | End: 2020-12-24

## 2020-12-22 RX ORDER — HYDROXYZINE PAMOATE 25 MG/1
25 CAPSULE ORAL 3 TIMES DAILY
Status: DISCONTINUED | OUTPATIENT
Start: 2020-12-22 | End: 2020-12-26 | Stop reason: HOSPADM

## 2020-12-22 RX ORDER — POTASSIUM CHLORIDE 20 MEQ/1
40 TABLET, EXTENDED RELEASE ORAL ONCE
Status: COMPLETED | OUTPATIENT
Start: 2020-12-22 | End: 2020-12-22

## 2020-12-22 RX ADMIN — SUMATRIPTAN SUCCINATE 100 MG: 100 TABLET ORAL at 22:52

## 2020-12-22 RX ADMIN — ATORVASTATIN CALCIUM 40 MG: 40 TABLET, FILM COATED ORAL at 12:20

## 2020-12-22 RX ADMIN — HYDROXYZINE PAMOATE 25 MG: 25 CAPSULE ORAL at 17:12

## 2020-12-22 RX ADMIN — Medication 10 ML: at 22:53

## 2020-12-22 RX ADMIN — IOPAMIDOL 100 ML: 612 INJECTION, SOLUTION INTRAVENOUS at 22:13

## 2020-12-22 RX ADMIN — SUMATRIPTAN SUCCINATE 6 MG: 6 INJECTION SUBCUTANEOUS at 12:20

## 2020-12-22 RX ADMIN — CETIRIZINE HYDROCHLORIDE 10 MG: 10 TABLET, FILM COATED ORAL at 12:20

## 2020-12-22 RX ADMIN — POTASSIUM CHLORIDE 40 MEQ: 20 TABLET, EXTENDED RELEASE ORAL at 10:13

## 2020-12-22 RX ADMIN — HYDROXYZINE PAMOATE 25 MG: 25 CAPSULE ORAL at 22:52

## 2020-12-22 RX ADMIN — LEVOTHYROXINE SODIUM 200 MCG: 0.1 TABLET ORAL at 10:17

## 2020-12-22 RX ADMIN — MECLIZINE HYDROCHLORIDE 25 MG: 25 TABLET ORAL at 17:11

## 2020-12-22 RX ADMIN — Medication 10 ML: at 10:14

## 2020-12-22 RX ADMIN — MECLIZINE HYDROCHLORIDE 25 MG: 25 TABLET ORAL at 22:52

## 2020-12-22 RX ADMIN — INSULIN LISPRO 1 UNITS: 100 INJECTION, SOLUTION INTRAVENOUS; SUBCUTANEOUS at 12:23

## 2020-12-22 RX ADMIN — ENOXAPARIN SODIUM 40 MG: 100 INJECTION SUBCUTANEOUS at 10:13

## 2020-12-22 ASSESSMENT — ENCOUNTER SYMPTOMS
VOMITING: 0
TROUBLE SWALLOWING: 0
NAUSEA: 0
PHOTOPHOBIA: 0
SHORTNESS OF BREATH: 0
COLOR CHANGE: 0
CHOKING: 0
BACK PAIN: 0

## 2020-12-22 ASSESSMENT — PAIN SCALES - GENERAL
PAINLEVEL_OUTOF10: 0

## 2020-12-22 NOTE — PROGRESS NOTES
acetaminophen (TYLENOL) tablet 650 mg  650 [facility]        Or    acetaminophen (TYLENOL) suppository 650 mg  650 mg Rectal Q6H PRN [facility]        glucose (GLUTOSE) 40 % oral gel 15 g  15 g Oral PRN [facility]        dextrose 50 % IV solution  12.5 g Intravenous PRN [facility]        glucagon (rDNA) injection 1 mg  1 [facility]amuscular [facility]N [facility]        dextrose 5 % solution  100 mL/hr Intravenous PRN [facility]        insulin lispro (HUMALOG) injection vial 0-6 Units  0-6 Units Subcutaneous TID WC [facility]        insulin lispro (HUMALOG) injection vial 0-3 Units  0-3 Units Subcutaneous Nightly [facility]        insulin glargine (LANTUS) injection vial 15 Units  15 Units Subcutaneous Nightly [facility]           Physical Examination:      Vitals:  /75   Pulse 56   Temp 98.4 °F (36.9 °C) (Oral)   Resp 18   Ht 4' 9\" (1.448 m)   Wt 189 lb 9.6 oz (86 kg)   SpO2 98%   BMI 41.03 kg/m²   Temp (24hrs), Av.8 °F (36.6 °C), Min:97.3 °F (36.3 °C), Max:98.4 °F (36.9 °C)      General appearance: Tired appearing. Obese. Answers questions slowly but appropriately.   Nystagmus of eyes present  Mental Status: oriented to person, place and time and normal affect  Lungs: clear to auscultation bilaterally, normal effort  Heart: regular rate and rhythm, no murmur  Abdomen: soft, nontender, nondistended, bowel sounds present, no masses  Extremities: no edema, redness, tenderness in the calves  Skin: no gross lesions, rashes    Data:     Labs:  Recent Labs     20  1330 20  0505   WBC 5.7 6.8   HGB 12.8 12.3    300     Recent Labs     20  1330 20  0505   * 132*   K 3.5 3.3*   CL 94* 95   CO2 28 25   BUN 12 12   CREATININE 1.08* 0.98*   GLUCOSE 183* 136*     Recent Labs     20  1330   AST 30   ALT 16   BILITOT 0.4   ALKPHOS 61       Assessment and Plan: 78-year-old female with history of class III obesity, type 2 diabetes, hypothyroidism presents with severe frontal headache with associated right side vision blurriness and right-sided tingling. 1.  Headache and vision change suspect secondary to severe hypothyroidism from noncompliance. Patient admits she has not taken her thyroid medication, or any medication, for the last 3 weeks because she forgot about it  -Neurology following. Will rule out underlying cerebellar infarct. Headache medication per neurology  -Resume thyroid medication. Endocrinology consulted  -PT/OT    Type 2 diabetes  Class III obesity: Recommend outpatient sleep study     Diet: DIET CARB CONTROL; Carb Control: 3 carb choices (45 gms)/meal  Ppx: lovenox  Full Code    Further disposition to depend on functional status.   Awaiting therapy evaluation    Electronically signed by Kimberly Nixon DO on 12/22/2020 at 11:44 AM

## 2020-12-22 NOTE — CARE COORDINATION
Met with patient at the bedside to discuss discharge plan of care. Patient states she lives with her spouse, is independent and plans to discharge to home. She stated her spouse will provide transportation to home and she will follow up with PCP as needed. Patient remains obs at this time.   Electronically signed by Gerardo Chavez RN on 12/22/2020 at 12:49 PM

## 2020-12-22 NOTE — CONSULTS
Subjective:      Patient ID: Marianne Weinstein is a 50 y.o. female who presents today for headaches and dizziness    HPI 50 right-handed female who presents with a headache she has bifrontal headache starting last night. She also had considerable vertigo with the same. Patient went to bed and awoke with a headache and felt drunk. Is under a large amount of stress lately financially. She does have history of vertigo and headaches in the past.  The headaches can last for a few hours or sometimes few days she has not been seen by neurology she is not on any preventive medications. She has been vomiting with a headache and hurts badly. Also reported some tingling of the right side of the face and upper extremity. She is not complaining of this at this time. She reports her walking is off since this has occurred. She is not able to walk but is good strength in the legs. She has photosensitivity. Denies any hearing loss or tinnitus or recent respiratory tract infections. She has previous history of otitis media with perforation. Review of Systems   Constitutional: Negative for fever. HENT: Negative for ear pain, tinnitus and trouble swallowing. Eyes: Negative for photophobia and visual disturbance. Respiratory: Negative for choking and shortness of breath. Cardiovascular: Negative for chest pain and palpitations. Gastrointestinal: Negative for nausea and vomiting. Musculoskeletal: Positive for gait problem. Negative for back pain, joint swelling, myalgias, neck pain and neck stiffness. Skin: Negative for color change. Allergic/Immunologic: Negative for food allergies. Neurological: Positive for dizziness and headaches. Negative for tremors, seizures, syncope, facial asymmetry, speech difficulty, weakness, light-headedness and numbness. Psychiatric/Behavioral: Negative for behavioral problems, confusion, hallucinations and sleep disturbance.        Past Medical History:   Diagnosis Date  Asthma     Depression     Endometriosis 03/2017    Hypothyroidism     Postmenopausal 2008    Type II diabetes mellitus, uncontrolled ([facility] [address]      Past Surgical History:   Procedure Laterality Date    DENTAL SURGERY      ENDOMETRIAL BIOPSY  03/2017    Alice lala    INNER EAR SURGERY Right 1993    ear perforation    ROTATOR CUFF REPAIR Left 12/12/2014    TONSILLECTOMY AND ADENOIDECTOMY      TYMPANOSTOMY TUBE PLACEMENT       Social History     Socioeconomic History    Marital status:      Spouse name: Not on file    Number of children: Not on file    Years of education: Not on file    Highest education level: Not on file   Occupational History    Not on file   Social Needs    Financial resource strain: Not on file    Food insecurity     Worry: Not on file     Inability: Not on file   [employer] needs     Medical: Not on file     Non-medical: Not on file   Tobacco Use    Smoking status: Never Smoker    Smokeless tobacco: Never Used   Substance and Sexual Activity    Alcohol use: No    Drug use: No    Sexual activity: Yes   Lifestyle    Physical activity     Days per week: Not on file     Minutes per session: Not on file    Stress: Not on file   Relationships    Social connections     Talks on phone: Not on file     Gets together: Not on file     Attends Bahai service: Not on file     Active member of club or organization: Not on file     Attends meetings of clubs or organizations: Not on file     Relationship status: Not on file    Intimate partner violence     Fear of current or ex partner: Not on file     Emotionally abused: Not on file     Physically abused: Not on file     Forced sexual activity: Not on file   Other Topics Concern    Not on file   Social History Narrative    Not on file     Family History   Problem Relation Age of Onset    Depression Mother     Diabetes Maternal Aunt     Diabetes Maternal Uncle     Asthma Maternal Grandmother     Cancer Maternal Grandmother         lung    High Blood Pressure Maternal Grandmother     High Blood Pressure Maternal Grandfather     Stroke Maternal Grandfather      No Known Allergies  Current Facility-Administered Medications   Medication Dose Route Frequency Provider Last Rate Last Admin    levothyroxine (SYNTHROID) tablet 200 mcg  200 mcg Oral Daily Bertrum Mediate, DO   200 mcg at 12/22/20 1017    hydrOXYzine (VISTARIL) capsule 25 mg  25 mg Oral TID Basia Monsivais MD        meclizine (ANTIVERT) tablet 25 mg  25 mg Oral TID Basia Monsivais MD        SUMAtriptan (IMITREX) injection 6 mg  6 mg Subcutaneous Once Basia Monsivais MD        SUMAtriptan (IMITREX) tablet 100 mg  100 mg Oral BID Basia Monsivais MD        prochlorperazine (COMPAZINE) injection 10 mg  10 mg Intravenous Q6H PRN Bertrum Mediate, DO   10 mg at 12/21/20 1119    SUMAtriptan (IMITREX) injection 6 mg  6 mg Subcutaneous Once Bertrum Mediate, DO        sodium chloride flush 0.9 % injection 10 mL  10 mL Intravenous 2 times per day Bertrum Mediate, DO   10 mL at 12/22/20 1014    sodium chloride flush 0.9 % injection 10 mL  10 mL Intravenous PRN Bertrum Mediate, DO        enoxaparin (LOVENOX) injection 40 mg  40 mg Subcutaneous Daily Bertrum Mediate, DO   40 mg at 12/22/20 1013    promethazine (PHENERGAN) tablet 12.5 mg  12.5 mg Oral Q6H PRN Bertrum Mediate, DO        Or    ondansetron [facility]) injection 4 mg  4 mg Intravenous Q6H PRN Bertrum Mediate, DO        polyethylene glycol (GLYCOLAX) packet 17 g  17 g Oral Daily PRN Bertrum Mediate, DO        acetaminophen (TYLENOL) tablet 650 mg  650 mg Oral Q6H PRN Bertrum Mediate, DO        Or    acetaminophen (TYLENOL) suppository 650 mg  650 mg Rectal Q6H PRN Bertrum Mediate, DO        glucose (GLUTOSE) 40 % oral gel 15 g  15 g Oral PRN Bertrum Mediate, DO        dextrose 50 % IV solution  12.5 g Intravenous PRN Bertrum Mediate, DO        glucagon (rDNA) injection 1 mg  1 mg Intramuscular PRN Macrina Spangler Roman,         dextrose 5 % solution  100 mL/hr Intravenous PRN Deirdre Double, DO        insulin lispro (HUMALOG) injection vial 0-6 Units  0-6 Units Subcutaneous TID WC Deirdre Double, DO        insulin lispro (HUMALOG) injection vial 0-3 Units  0-3 Units Subcutaneous Nightly Deirdre Double, DO        insulin glargine (LANTUS) injection vial 15 Units  15 Units Subcutaneous Nightly Deirdre Double, DO            Objective:   /75   Pulse 56   Temp 98.4 °F (36.9 °C) (Oral)   Resp 18   Ht 4' 9\" (1.448 m)   Wt 189 lb 9.6 oz (86 kg)   SpO2 98%   BMI 41.03 kg/m²     Physical Exam  Vitals signs reviewed. Eyes:      Pupils: Pupils are equal, round, and reactive to light. Neck:      Musculoskeletal: Normal range of motion. Cardiovascular:      Rate and Rhythm: Normal rate and regular rhythm. Heart sounds: No murmur. Pulmonary:      Effort: Pulmonary effort is normal.      Breath sounds: Normal breath sounds. Abdominal:      General: Bowel sounds are normal.   Musculoskeletal: Normal range of motion. Skin:     General: Skin is warm. Neurological:      Mental Status: She is alert and oriented to person, place, and time. Cranial Nerves: No cranial nerve deficit. Sensory: No sensory deficit. Motor: No abnormal muscle tone. Coordination: Coordination normal.      Deep Tendon Reflexes: Reflexes are normal and symmetric. Babinski sign absent on the right side. Babinski sign absent on the left side. Psychiatric:         Mood and Affect: Mood normal.     Patient has prominent nystagmus which is congenital.  She is aware of this. She has a partial Hallpike maneuver which is positive we were not able to do much as she became dizzy. Neck is supple. She is not myelopathic and she is ataxic with swaying feeling.     Ct Head Wo Contrast    Result Date: 12/21/2020  EXAMINATION:  CT HEAD WO CONTRAST HISTORY:   frontal headache and vertigo    Vomiting TECHNIQUE:  Serial axial images without IV contrast were obtained from the vertex to the foramen magnum. Sagittal and coronal reconstructions. All CT scans at this facility use dose modulation, iterative reconstruction, and/or weight based dosing when appropriate to reduce radiation dose to as low as reasonably achievable. COMPARISON:  CT head 9/27/2013 RESULT: Acute change:   No evidence of an acute infarct or other acute parenchymal process. Hemorrhage:    No evidence of acute intracranial hemorrhage. Mass Lesion / Mass Effect:   There is no evidence of an intracranial mass or extraaxial fluid collection. No significant mass effect. Chronic change:   None apparent. Parenchyma: There is no significant volume loss for age. The brain parenchyma is otherwise within normal limits for age. Ventricles: The ventricles are within normal limits of size and configuration for age. Paranasal sinuses and skull base:  Mild paranasal sinus disease in the visualized sinuses with areas of polypoid thickening. Mastoid air cells are clear. The skull base is unremarkable. Soft tissues unremarkable. No acute intracranial process. Paranasal sinus disease.        Lab Results   Component Value Date    WBC 6.8 12/22/2020    RBC 3.93 12/22/2020    HGB 12.3 12/22/2020    HCT 36.9 12/22/2020    MCV 94.0 12/22/2020    MCH 31.3 12/22/2020    MCHC 33.3 12/22/2020    RDW 15.4 12/22/2020     12/22/2020    MPV 8.7 12/08/2014     Lab Results   Component Value Date     12/22/2020    K 3.3 12/22/2020    CL 95 12/22/2020    CO2 25 12/22/2020    BUN 12 12/22/2020    CREATININE 0.98 12/22/2020    GFRAA >60.0 12/22/2020    LABGLOM >60.0 12/22/2020    GLUCOSE 136 12/22/2020    PROT 7.8 12/21/2020    LABALBU 4.9 12/21/2020    CALCIUM 8.4 12/22/2020    BILITOT 0.4 12/21/2020    ALKPHOS 61 12/21/2020    AST 30 12/21/2020    ALT 16 12/21/2020     Lab Results   Component Value Date    PROTIME 9.9 12/08/2014    INR 1.0 12/08/2014     Lab Results

## 2020-12-22 NOTE — PROGRESS NOTES
Assessment completed, patient alert and oriented x 4, very drowsy but answers appropriately, lungs clear, heart rate regular but douglas, bowel sounds active, last bm 12-20, left lower extremity with 1+ pitting edema, patient noted with scabbed areas covering bilateral lower extremities, patient states these are from her cat scratching her and then she will pick at them, education given regarding not picking due to bacteria under the fingernails could cause infection, reinforcement will be needed for this patient, neuro assessment completed, moderate strength noted throughout, patient states she is still not able to walk, will make the doctor aware that therapy is needed

## 2020-12-22 NOTE — PROGRESS NOTES
[facility] OCCUPATIONAL THERAPY EVALUATION - ACUTE     NAME: Jayne Sandifer  : 1975 (50 y.o.)  MRN: [id number]  CODE STATUS: Full Code  Room: [place] [address]    Date of Service: 2020    Patient Diagnosis(es): Migraine variant [G43.809]  Hypothyroidism [E03.9]   Chief Complaint   Patient presents with    Headache     Patient Active Problem List    Diagnosis Date Noted    Hypothyroidism 2020    Migraine variant 2020    Acute otitis media with perforation, right 2019    History of perforation of tympanic membrane 2019    Essential hypertension 10/25/2016    Acquired hypothyroidism 10/25/2016    Type 2 diabetes mellitus without complication, without long-term current use of insulin ([facility] [address] 10/25/2016        Past Medical History:   Diagnosis Date    Asthma     Depression     Endometriosis 2017    Hypothyroidism     Postmenopausal     Type II diabetes mellitus, uncontrolled ([address]      Past Surgical History:   Procedure Laterality Date    DENTAL SURGERY      ENDOMETRIAL BIOPSY  2017    Alice lala    INNER EAR SURGERY Right 1993    ear perforation    ROTATOR CUFF REPAIR Left 2014    TONSILLECTOMY AND ADENOIDECTOMY      TYMPANOSTOMY TUBE PLACEMENT          Restrictions  Restrictions/Precautions: Fall Risk(Per therapist clinical judgement)     Safety Devices: Safety Devices  Safety Devices in place: Yes  Type of devices:  All fall risk precautions in place        Subjective  Pre Treatment Pain Screening  Pain at present: 0  Scale Used: Numeric Score    Pain Reassessment:   Pain Assessment  Patient Currently in Pain: No       Prior Level of Function:  Social/Functional History  Lives With: Spouse  Type of Home: House  Home Layout: Two level, Bed/Bath upstairs  Home Access: Stairs to enter with rails  Entrance Stairs - Number of Steps: 1  Bathroom Shower/Tub: Tub/Shower unit  ADL Assistance: Independent  Homemaking Assistance: Independent(Shares with spouse)  Ambulation Assistance: Independent(No AD)  Transfer Assistance: Independent  Active : Yes  Occupation: Unemployed  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, unemployed d/t COVID    OBJECTIVE:     Orientation Status:  Orientation  Overall Orientation Status: Within Functional Limits    Observation:  Observation/Palpation  Posture: Fair    Cognition Status:  Cognition  Overall Cognitive Status: WFL    Perception Status:  Perception  Overall Perceptual Status: WFL    Sensation Status:  Sensation  Overall Sensation Status: WFL    Vision and Hearing Status:  Vision  Vision: Impaired  Vision Exceptions: Wears glasses at all times(Visual impairment on R side flashing/blurry d/t migraine per pt report)  Hearing  Hearing: Within functional limits     ROM:   LUE AROM (degrees)  LUE AROM : WFL  RUE AROM (degrees)  RUE AROM : WFL    Strength:  LUE Strength  Gross LUE Strength: WFL  L Hand General: 4/5  LUE Strength Comment: 4/5 all planes  RUE Strength  Gross RUE Strength: WFL  R Hand General: 4/5  RUE Strength Comment: 4/5 all planes    Coordination, Tone, Quality of Movement: Tone RUE  RUE Tone: Normotonic  Tone LUE  LUE Tone: Normotonic  Coordination  Movements Are Fluid And Coordinated: Yes    Hand Dominance:  Hand Dominance  Hand Dominance: Right    ADL Status:  ADL  Feeding: Setup  Grooming: Setup  UE Bathing: Minimal assistance  LE Bathing: Moderate assistance  UE Dressing: Minimal assistance  LE Dressing: Maximum assistance  Toileting: Moderate assistance  Toilet Transfers  Toilet Transfer:  Moderate assistance       Therapy key for assistance levels -   Independent = Pt. is able to perform task with no assistance but may require a device   Stand by assistance = Pt. does not perform task at an independent level but does not need physical assistance, requires verbal cues  Minimal, Moderate, Maximal Assistance = Pt. requires physical assistance (25%, 50%, 75% assist from helper) for task but is able to actively participate in task   Dependent = Pt. requires total assistance with task and is not able to actively participate with task completion     Functional Mobility:  Functional Mobility  Functional - Mobility Device: Rolling Walker  Activity: (To/from bedside commode)  Assist Level: Moderate assistance  Transfers  Sit to stand: Minimal assistance  Stand to sit: Moderate assistance    Bed Mobility  Bed mobility  Supine to Sit: Contact guard assistance  Sit to Supine: Minimal assistance  Scooting: Minimal assistance    Seated and Standing Balance:  Balance  Sitting Balance: Moderate assistance  Standing Balance: Moderate assistance    Functional Endurance:  Activity Tolerance  Activity Tolerance: Patient limited by pain, Treatment limited secondary to medical complications (free text), Patient limited by fatigue(Limited by nausea/light headedness per pt report)    D/C Recommendations:  OT D/C RECOMMENDATIONS  REQUIRES OT FOLLOW UP: Yes    OT Education:   OT Education  OT Education: OT Role, Plan of Care    OT Follow Up:  OT D/C RECOMMENDATIONS  REQUIRES OT FOLLOW UP: Yes       Assessment/Discharge Disposition:  Assessment: Pt is a 49 y/o woman from home with spouse who presents to [facility] with the above deficits. Pt would benefit from cont. OT to maximize safety and independence at home.   Performance deficits / Impairments: Decreased functional mobility , Decreased ADL status, Decreased strength, Decreased balance, Decreased safe awareness, Decreased posture, Decreased ROM, Decreased endurance  Prognosis: Good  Discharge Recommendations: Continue to assess pending progress  Decision Making: Medium Complexity  Exam: 8 performance deficits    Six Click Score   How much help for putting on and taking off regular lower body clothing?: A Lot  How much help for Bathing?: A Lot  How much help for Toileting?: A Lot  How much help for putting on and taking off regular upper body clothing?: A Little  How much help for taking care of personal grooming?: A Little  How much help for eating meals?: A Little  AM-PAC Inpatient Daily Activity Raw Score: 15  AM-PAC Inpatient ADL T-Scale Score : 34.69  ADL Inpatient CMS 0-100% Score: 56.46    Plan:  Plan  Times per week: 1-3x  Plan weeks: Length of acute stay  Current Treatment Recommendations: Strengthening, Pain Management, Safety Education & Training, Balance Training, Self-Care / ADL, Functional Mobility Training, Endurance Training    Goals:   Patient will:    - Improve functional endurance to tolerate/complete 15  mins of ADL's  - Be setup in UB ADLs   - Be Mod A  in LB ADLs  - Be Min A  in ADL transfers without LOB  - Be Min A  in toileting tasks  - Improve B UE Function (AROM, strength, motor control, tone normalization) to complete ADLs as projected. - Improve B UE strength and endurance to 4+/5 in order to participate in self-care activities as projected.     Patient Goal: Patient goals : I want to get home     Discussed and agreed upon: Yes Comments:     Therapy Time:   OT Individual Minutes  Time In: 6410  Time Out: 1201  Minutes: 17    Eval: 17 minutes     Electronically signed by:    VERA Nava  12/22/2020, 12:48 PM

## 2020-12-23 LAB
ANION GAP SERPL CALCULATED.3IONS-SCNC: 9 MEQ/L (ref 9–15)
BASOPHILS ABSOLUTE: 0.1 K/UL (ref 0–0.2)
BASOPHILS RELATIVE PERCENT: 0.7 %
BUN BLDV-MCNC: 15 MG/DL (ref 6–20)
CALCIUM SERPL-MCNC: 9 MG/DL (ref 8.5–9.9)
CHLORIDE BLD-SCNC: 92 MEQ/L (ref 95–107)
CK MB: 6.1 NG/ML (ref 0–3.8)
CO2: 29 MEQ/L (ref 20–31)
CREAT SERPL-MCNC: 1.19 MG/DL (ref 0.5–0.9)
CREATINE KINASE-MB INDEX: 1.1 % (ref 0–3.5)
EOSINOPHILS ABSOLUTE: 0.2 K/UL (ref 0–0.7)
EOSINOPHILS RELATIVE PERCENT: 2.8 %
GFR AFRICAN AMERICAN: 58.8
GFR NON-AFRICAN AMERICAN: 48.6
GLUCOSE BLD-MCNC: 115 MG/DL (ref 70–99)
GLUCOSE BLD-MCNC: 123 MG/DL (ref 60–115)
GLUCOSE BLD-MCNC: 148 MG/DL (ref 60–115)
GLUCOSE BLD-MCNC: 148 MG/DL (ref 60–115)
GLUCOSE BLD-MCNC: 152 MG/DL (ref 60–115)
HCT VFR BLD CALC: 36.4 % (ref 37–47)
HEMOGLOBIN: 12.4 G/DL (ref 12–16)
LYMPHOCYTES ABSOLUTE: 1.8 K/UL (ref 1–4.8)
LYMPHOCYTES RELATIVE PERCENT: 23.9 %
MAGNESIUM: 2.7 MG/DL (ref 1.7–2.4)
MCH RBC QN AUTO: 32.2 PG (ref 27–31.3)
MCHC RBC AUTO-ENTMCNC: 34.1 % (ref 33–37)
MCV RBC AUTO: 94.3 FL (ref 82–100)
MONOCYTES ABSOLUTE: 0.7 K/UL (ref 0.2–0.8)
MONOCYTES RELATIVE PERCENT: 9.4 %
NEUTROPHILS ABSOLUTE: 4.9 K/UL (ref 1.4–6.5)
NEUTROPHILS RELATIVE PERCENT: 63.2 %
PDW BLD-RTO: 15.5 % (ref 11.5–14.5)
PERFORMED ON: ABNORMAL
PLATELET # BLD: 306 K/UL (ref 130–400)
POTASSIUM REFLEX MAGNESIUM: 3.4 MEQ/L (ref 3.4–4.9)
RBC # BLD: 3.85 M/UL (ref 4.2–5.4)
SODIUM BLD-SCNC: 130 MEQ/L (ref 135–144)
TOTAL CK: 563 U/L (ref 0–170)
WBC # BLD: 7.7 K/UL (ref 4.8–10.8)

## 2020-12-23 PROCEDURE — 83735 ASSAY OF MAGNESIUM: CPT

## 2020-12-23 PROCEDURE — APPSS45 APP SPLIT SHARED TIME 31-45 MINUTES: Performed by: STUDENT IN AN ORGANIZED HEALTH CARE EDUCATION/TRAINING PROGRAM

## 2020-12-23 PROCEDURE — 6370000000 HC RX 637 (ALT 250 FOR IP): Performed by: INTERNAL MEDICINE

## 2020-12-23 PROCEDURE — 80048 BASIC METABOLIC PNL TOTAL CA: CPT

## 2020-12-23 PROCEDURE — 6370000000 HC RX 637 (ALT 250 FOR IP): Performed by: PSYCHIATRY & NEUROLOGY

## 2020-12-23 PROCEDURE — 82550 ASSAY OF CK (CPK): CPT

## 2020-12-23 PROCEDURE — 85025 COMPLETE CBC W/AUTO DIFF WBC: CPT

## 2020-12-23 PROCEDURE — 36415 COLL VENOUS BLD VENIPUNCTURE: CPT

## 2020-12-23 PROCEDURE — 82553 CREATINE MB FRACTION: CPT

## 2020-12-23 PROCEDURE — 99233 SBSQ HOSP IP/OBS HIGH 50: CPT | Performed by: PSYCHIATRY & NEUROLOGY

## 2020-12-23 PROCEDURE — 1210000000 HC MED SURG R&B

## 2020-12-23 PROCEDURE — 6360000002 HC RX W HCPCS: Performed by: INTERNAL MEDICINE

## 2020-12-23 PROCEDURE — 2580000003 HC RX 258: Performed by: INTERNAL MEDICINE

## 2020-12-23 PROCEDURE — 97162 PT EVAL MOD COMPLEX 30 MIN: CPT

## 2020-12-23 RX ORDER — SODIUM CHLORIDE 9 MG/ML
INJECTION, SOLUTION INTRAVENOUS CONTINUOUS
Status: DISCONTINUED | OUTPATIENT
Start: 2020-12-23 | End: 2020-12-26 | Stop reason: HOSPADM

## 2020-12-23 RX ADMIN — ENOXAPARIN SODIUM 40 MG: 100 INJECTION SUBCUTANEOUS at 10:35

## 2020-12-23 RX ADMIN — LEVOTHYROXINE SODIUM 300 MCG: 0.1 TABLET ORAL at 10:34

## 2020-12-23 RX ADMIN — CETIRIZINE HYDROCHLORIDE 10 MG: 10 TABLET, FILM COATED ORAL at 10:34

## 2020-12-23 RX ADMIN — HYDROXYZINE PAMOATE 25 MG: 25 CAPSULE ORAL at 10:35

## 2020-12-23 RX ADMIN — SUMATRIPTAN SUCCINATE 100 MG: 100 TABLET ORAL at 20:47

## 2020-12-23 RX ADMIN — SODIUM CHLORIDE: 9 INJECTION, SOLUTION INTRAVENOUS at 20:46

## 2020-12-23 RX ADMIN — SODIUM CHLORIDE: 9 INJECTION, SOLUTION INTRAVENOUS at 11:18

## 2020-12-23 RX ADMIN — MECLIZINE HYDROCHLORIDE 25 MG: 25 TABLET ORAL at 20:47

## 2020-12-23 RX ADMIN — HYDROXYZINE PAMOATE 25 MG: 25 CAPSULE ORAL at 20:47

## 2020-12-23 RX ADMIN — SUMATRIPTAN SUCCINATE 100 MG: 100 TABLET ORAL at 10:34

## 2020-12-23 RX ADMIN — MECLIZINE HYDROCHLORIDE 25 MG: 25 TABLET ORAL at 10:34

## 2020-12-23 ASSESSMENT — PAIN SCALES - GENERAL
PAINLEVEL_OUTOF10: 5
PAINLEVEL_OUTOF10: 4
PAINLEVEL_OUTOF10: 4
PAINLEVEL_OUTOF10: 0
PAINLEVEL_OUTOF10: 0

## 2020-12-23 ASSESSMENT — ENCOUNTER SYMPTOMS
NAUSEA: 0
NAUSEA: 1
CHOKING: 0
COUGH: 0
TROUBLE SWALLOWING: 0
DIARRHEA: 0
SHORTNESS OF BREATH: 0
CONSTIPATION: 0
BACK PAIN: 0
VOMITING: 0

## 2020-12-23 ASSESSMENT — PAIN DESCRIPTION - LOCATION: LOCATION: HEAD

## 2020-12-23 NOTE — PROGRESS NOTES
[facility] Neurology Daily Progress Note  Name: Magdiel Peters  Age: 50 y.o. Gender: female  CodeStatus: Full Code  Allergies: No Known Allergies    Chief Complaint:Headache    Primary Care Provider: Michelle Friedman PA-C  InpatientTreatment Team: Treatment Team: Attending Provider: Isabella Lanza MD; Consulting Physician: Tanya Castro MD; Consulting Physician: Alpa Rm MD; Utilization Reviewer: Maddie Nguyen RN; LPN: Kiki Bennett LPN; : Mane Murry RN; Registered Nurse: Agnes Maldonado RN; Patient Care Tech: Emperatriz Wellington  Admission Date: 12/21/2020      HPI     HPI 50 right-handed female who presents with a headache she has bifrontal headache starting last night. She also had considerable vertigo with the same. Patient went to bed and awoke with a headache and felt drunk. Is under a large amount of stress lately financially. She does have history of vertigo and headaches in the past.  The headaches can last for a few hours or sometimes few days she has not been seen by neurology she is not on any preventive medications. She has been vomiting with a headache and hurts badly. Also reported some tingling of the right side of the face and upper extremity. She is not complaining of this at this time. She reports her walking is off since this has occurred. She is not able to walk but is good strength in the legs. She has photosensitivity. Denies any hearing loss or tinnitus or recent respiratory tract infections. She has previous history of otitis media with perforation. Reviewed results from CTA of head and neck are still pending. MRI of head is negative. Patient is experiencing increased vertigo today and has a headache. Patient is a poor historian. States that she did not take the Ativert or hydroxyzine prescribed to her yesterday because the symptoms resolved on their own.   However, the EMR and nursing shows she had 2 doses of hydroxyzine and meclizine as well as 1 dose of Imitrex yesterday. Today, she feels that the medication is making her vertigo episodes less frequent and less severe. However, she was extremely nauseous at one point in the day, and reports vomiting. Describes headache as affecting her forehead bilaterally as well as her occipital region. This is new for her, as she states headaches in the past were generalizes head throbbing relieved by tylenol with no associated symptoms including photophobia, nausea, or vomiting. She was found to have severe hypothyroidism and endo is on consult. Still dizzy,  Some better    Vitals:    12/22/20 1919   BP: 122/71   Pulse: 56   Resp: 18   Temp: 97.7 °F (36.5 °C)   SpO2: 100%      Review of Systems   Constitutional: Negative for chills and fever. HENT: Negative for congestion and trouble swallowing. Respiratory: Negative for cough, choking and shortness of breath. Cardiovascular: Negative for chest pain. Gastrointestinal: Positive for nausea. Negative for constipation and diarrhea. Musculoskeletal: Positive for gait problem. Negative for back pain and myalgias. Neurological: Positive for dizziness and headaches. Negative for tremors, seizures, syncope, facial asymmetry, speech difficulty, weakness, light-headedness and numbness. Psychiatric/Behavioral: Negative. as above  Physical Exam  Vitals signs and nursing note reviewed. Constitutional:       Appearance: Normal appearance. HENT:      Head: Normocephalic and atraumatic. Eyes:      Conjunctiva/sclera: Conjunctivae normal.      Pupils: Pupils are equal, round, and reactive to light. Comments: Has bilateral nystagmus   Cardiovascular:      Rate and Rhythm: Normal rate and regular rhythm. Pulmonary:      Effort: Pulmonary effort is normal.      Breath sounds: Normal breath sounds. Musculoskeletal: Normal range of motion. Skin:     General: Skin is warm and dry. Neurological:      General: No focal deficit present. Mental Status: She is alert and oriented to person, place, and time. Psychiatric:         Mood and Affect: Mood normal.       Neurologic Exam     Mental Status   Oriented to person, place, and time. Cranial Nerves     CN III, IV, VI   Pupils are equal, round, and reactive to light. Still ataxic    Medications:  Reviewed    Infusion Medications:    sodium chloride      dextrose       Scheduled Medications:    hydrOXYzine  25 mg Oral TID    meclizine  25 mg Oral TID    SUMAtriptan  100 mg Oral BID    fluticasone  1 spray Nasal Daily    cetirizine  10 mg Oral Daily    [Held by provider] atorvastatin  40 mg Oral Daily    levothyroxine  300 mcg Oral Daily    sodium chloride flush  10 mL Intravenous 2 times per day    enoxaparin  40 mg Subcutaneous Daily    insulin lispro  0-6 Units Subcutaneous TID WC    insulin lispro  0-3 Units Subcutaneous Nightly     PRN Meds: prochlorperazine, sodium chloride flush, promethazine **OR** ondansetron, polyethylene glycol, acetaminophen **OR** acetaminophen, glucose, dextrose, glucagon (rDNA), dextrose    Labs:   Recent Labs     12/21/20  1330 12/22/20  0505 12/23/20  0452   WBC 5.7 6.8 7.7   HGB 12.8 12.3 12.4   HCT 37.6 36.9* 36.4*    300 306     Recent Labs     12/21/20  1330 12/22/20  0505 12/23/20  0452   * 132* 130*   K 3.5 3.3* 3.4   CL 94* 95 92*   CO2 28 25 29   BUN 12 12 15   CREATININE 1.08* 0.98* 1.19*   CALCIUM 8.9 8.4* 9.0     Recent Labs     12/21/20  1330   AST 30   ALT 16   BILITOT 0.4   ALKPHOS 61     No results for input(s): INR in the last 72 hours. Recent Labs     12/22/20  1116   CKTOTAL 638*       Urinalysis:   No results found for: Carole Dose, BACTERIA, RBCUA, BLOODU, [address]    Radiology:   Most recent    EEG No procedure found. MRI of Brain No results found for this or any previous visit.   Results for orders placed during the hospital encounter of 12/21/20   MRI BRAIN WO CONTRAST    Narrative EXAMINATION:  MRI

## 2020-12-23 NOTE — CARE COORDINATION
Met with patient to discuss discharge plan of care and the recommendation from PT services. Patient would like to return to home with her spouse. Freedom of choice offered, she would like [facility] to provide the necessary services and anticipates going home 12/24. Note to Dr. Rosa Ruiz  Requesting order.   Electronically signed by Devorah Hutchins RN on 12/23/2020 at 4:06 PM

## 2020-12-23 NOTE — FLOWSHEET NOTE
Pt ws awake in the room this morning talking with staff said she felt 100% better than yesterday. Pt was assisted to chair for dinner and was doing really well then she said she wanted to get back into the bed. Encouraged pt to sit up. Pt then said she was going to vomit but never did and was returned to bed. .Electronically signed by Sophia Balbuena LPN on 24/23/6565 at 11:45 AM   Assisted pt up in the chair for lunch ,while getting her up she states that she is dizzy and does not feel she can eat. But encouraged her to sit up and then she will start to feel better and take small bites of food. Pt sat up and then began eating and consumed 75% of her lunch kept her up in the chair for another 1/2 hour then returned her to bed. Electronically signed by Sophia Balbuena LPN on 29/71/8662 at 2:03 PM   Pt refused  anavert and vistril at 1400 states she doesn't need it . At 1620 pt put the light on asking to get up in the chair. Assisted pt up in chair and assisted with the set up of dinner.  Pt ate 100 % of dinner and requested another ice cream. As of this time pt remains up in chair per her request.Electronically signed by Sophia Balbuena LPN on 40/66/2394 at 6:36 PM

## 2020-12-23 NOTE — PROGRESS NOTES
Patient had a large bowel movement and was incontinent at first but able to call to get assistance. Patient used the bedpan. She denies any pain. No headache at this time.

## 2020-12-23 NOTE — PROGRESS NOTES
Physical Therapy Med Surg Initial Assessment  Facility/Department: [facility]  Room: A460B504-51       NAME: Sharan Islas  : 1975 (50 y.o.)  MRN: [id number]  CODE STATUS: Full Code    Date of Service: 2020    Patient Diagnosis(es): Migraine variant [G43.809]  Hypothyroidism [E03.9]   Chief Complaint   Patient presents with    Headache     Patient Active Problem List    Diagnosis Date Noted    Hypothyroidism 2020    Migraine variant 2020    Acute otitis media with perforation, right 2019    History of perforation of tympanic membrane 2019    Essential hypertension 10/25/2016    Acquired hypothyroidism 10/25/2016    Type 2 diabetes mellitus without complication, without long-term current use of insulin ([address] 10/25/2016        Past Medical History:   Diagnosis Date    Asthma     Depression     Endometriosis 2017    Hypothyroidism     Postmenopausal     Type II diabetes mellitus, uncontrolled ([address]      Past Surgical History:   Procedure Laterality Date    DENTAL SURGERY      ENDOMETRIAL BIOPSY  2017    Alice lala    INNER EAR SURGERY Right     ear perforation    ROTATOR CUFF REPAIR Left 2014    TONSILLECTOMY AND ADENOIDECTOMY      TYMPANOSTOMY TUBE PLACEMENT         Chart Reviewed: Yes  Patient assessed for rehabilitation services?: Yes  Family / Caregiver Present: No  General Comment  Comments: keeps eyes closed during rest breaks due to headache; bucket by pt due to nausea; initially reluctant to complete therapy eval    Restrictions:  Restrictions/Precautions: Fall Risk(history of vertigo)     SUBJECTIVE: Subjective:  \"If I need rehab, I will do it\"    Pain  Pre Treatment Pain Screening  Pain at present: 0  Intervention List: Patient able to continue with treatment    Post Treatment Pain Screening:   Pain Assessment  Pain Level: 0    Prior Level of Function:  Social/Functional History  Lives With: Spouse  Type of Home: [facility] Layout: Two level, Bed/Bath upstairs  Home Access: Stairs to enter with rails  Entrance Stairs - Number of Steps: 1  Bathroom Shower/Tub: Tub/Shower unit  ADL Assistance: Independent  Homemaking Assistance: Independent(Shares with spouse)  Ambulation Assistance: Independent(No AD)  Transfer Assistance: Independent  Active : Yes  Occupation: Unemployed  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, unemployed d/t COVID; amb grocery store distances at :   Vision: Impaired(reports blurry vision R eye- pt stated MD is aware)  Vision Exceptions: Wears glasses at all times(Visual impairment on R side flashing/blurry d/t migraine per pt report)  Hearing: Within functional limits    Cognition:  Overall Orientation Status: Within Functional Limits  Follows Commands: Within Functional Limits    Observation/Palpation  Posture: Fair(slouched posture)  Observation: nystagmus B eyes- pt stated issue since pt was a child- states it worsens when the pt is nervous; numerous scabs B lower legs    ROM:  RLE AROM: WFL  LLE AROM : WFL    Strength:  Strength RLE  Comment: grossly 4+/5- some proximal hip weakness  Strength LLE  Comment: grossly 4+/5- some proximal hip weakness    Neuro:  Balance  Posture: Fair(slouched posture)  Sitting - Static: Good  Sitting - Dynamic: Good(with UE support no LOB or increased sway)  Standing - Static: Fair;-(max increased sway without UE support)  Standing - Dynamic: Fair;-(pt requires B UE support to maintain steadiness in standing)     Tone RLE  RLE Tone: Normotonic  Tone LLE  LLE Tone: Normotonic  Motor Control  Gross Motor?: WFL  Sensation  Overall Sensation Status: WFL    Bed mobility  Supine to Sit: Stand by assistance  Sit to Supine: Stand by assistance  Comment: increased time required to complete due to dizziness; HOB elevated; pt used rail    Transfers  Sit to Stand: Contact guard assistance  Stand to sit: Contact guard assistance  Comment: assistance due to moderate increased sway with standing and due to c/o dizziness    Ambulation  Ambulation?: Yes  Ambulation 1  Surface: level tile  Device: No Device  Assistance: Contact guard assistance  Quality of Gait: knee buckling which pt was indep able to correct  Gait Deviations: Slow Leann; Increased HEMA  Distance: 5 ft to bedside chair; pt declined to amb further but mostly likely could have completed increased distance gait training this date  Comments: HHA              Activity Tolerance  Activity Tolerance: Patient limited by fatigue;Patient limited by endurance  Activity Tolerance: Treatment limited due to dizziness and desire to complete limited movement          PT Education  PT Education: Goals;Transfer Training;PT Role;Plan of Care;General Safety;Gait Training    ASSESSMENT:   Body structures, Functions, Activity limitations: Decreased functional mobility ; Decreased posture;Decreased endurance;Decreased strength;Decreased balance;Decreased vision/visual deficit  Decision Making: Medium Complexity  History: med  Exam: med  Clinical Presentation: med    Prognosis: Good    DISCHARGE RECOMMENDATIONS:  Discharge Recommendations: Continue to assess pending progress, Patient would benefit from continued therapy after discharge    Assessment: Pt exhibits increased dizziness, decreased strength and balance, decreased activity tolerance for standing functional mobility and increased assistance for transfers and amb requiring continued therapy for safe d/c home.       REQUIRES PT FOLLOW UP: Yes      PLAN OF CARE:  Plan  Times per week: 3-6  Current Treatment Recommendations: Strengthening, Transfer Training, Endurance Training, Neuromuscular Re-education, Patient/Caregiver Education & Training, ROM, Manual Therapy - Soft Tissue Mobilization, Pain Management, Equipment Evaluation, Education, & procurement, Balance Training, Gait Training, Home Exercise Program, Modalities, Functional Mobility Training, Stair training, Safety Education & Training  Safety Devices  Type of devices: Call light within reach, Left in bed, Nurse notified    Goals:  Patient goals : \"return home\"  Long term goals  Long term goal 1: Pt will demonstrate bed mobility indep with minimal dizziness symptoms for safe d/c home. Long term goal 2: Pt will demonstrate transfers indep with minimal dizziness symptoms to allow for safe return home. Long term goal 3: Pt will demonstrate amb 150 ft indep without AD to allow for safe amb inside pt's home at  [facility]. Long term goal 4: Pt will demonstrate stair negotiation 1 step indep to allow pt to safely enter and exit her home. [facility] (6 CLICK) BASIC MOBILITY  AM-PAC Inpatient Mobility Raw Score : 20    Therapy Time:   Individual   Time In 1310   Time Out 1325   Minutes [facility], [address], 12/23/20 at 1:38 PM         Definitions for assistance levels  Independent = pt does not require any physical supervision or assistance from another person for activity completion. Device may be needed.   Stand by assistance = pt requires verbal cues or instructions from another person, close to but not touching, to perform the activity  Minimal assistance= pt performs 75% or more of the activity; assistance is required to complete the activity  Moderate assistance= pt performs 50% of the activity; assistance is required to complete the activity  Maximal assistance = pt performs 25% of the activity; assistance is required to complete the activity  Dependent = pt requires total physical assistance to accomplish the task

## 2020-12-23 NOTE — CONSULTS
[address]                      [address], [address] [facility]                                  CONSULTATION    PATIENT NAME: Edilma Washington                    :        1975  MED REC NO:   [id number]                            ROOM:       W265  ACCOUNT NO:   [de-identified]                           ADMIT DATE: 2020  PROVIDER:     Kathlyn Riedel, MD    CONSULT DATE:  2020    ENDOCRINE CONSULT    REFERRING PROVIDER:  Mago Dunn DO    REASON FOR CONSULT:  Severe hypothyroidism. CHIEF COMPLAINT AND HISTORY OF PRESENT ILLNESS:  The patient is a  64-year-old female with known history of hypothyroidism since she was 10 years old with poor compliance with her levothyroxine and admitted with  headache. Headache was in the frontal area, getting worse and also  complained of blurred vision. The patient also has signs and symptoms  of severe hypothyroidism including cold intolerance, plethoric face,  fatigue, muscle aches. CT scan of the head was negative. Initial  admitting diagnosis was migraine variant, severe hypothyroidism. The  patient's TSH was elevated at 173 done today, yesterday it was 229. The  patient was started on levothyroxine 200 mcg daily, the dose was  increased to 300 mcg daily. Reviewed results from adrenal function  test, cortisol was 35.3, appropriate for her current stress levels. PAST MEDICAL HISTORY:  Significant for hypothyroidism, depression,  asthma, type 2 diabetes, endometriosis, morbid obesity. PAST SURGICAL HISTORY:  Tonsillectomy, adenoidectomy, tympanoplasty, ear  surgery, rotator cuff surgery, endometrial biopsy, dental surgery. FAMILY HISTORY:  Significant for depression, asthma. PERSONAL AND SOCIAL HISTORY:  Denies any smoking, alcohol, substance  abuse. MEDICATIONS:  Here include Synthroid 300 mcg daily, Lipitor, Zyrtec,  Lovenox, Flonase, Vistaril, Antivert, Imitrex 100 mg twice a day. ALLERGIES:  None. REVIEW OF SYSTEMS:  Other than headache, weight gain, fatigue and muscle  aches, a 14-point review of systems is negative. PHYSICAL EXAMINATION:  GENERAL:  The patient is alert, awake, appears ill. VITAL SIGNS:  Blood pressure 122/71, pulse rate was 56, respiratory rate  was 18, temperature 97. HEENT:  Shows plethoric face. Pupils equal and reacting to light. Oral  mucosa was moist.  NECK:  Thyroid gland was not palpated. Neck was supple. Trachea in  midline. CHEST:  Lungs were clear to auscultation bilaterally. CARDIOVASCULAR:  Heart sounds were showing bradycardia. No murmurs or  thrills were present. ABDOMEN:  Soft, significantly obese. Bowel sounds were present. EXTREMITIES:  Lower extremities reveal trace edema. SKIN:  Intact. MUSCULOSKELETAL:  No joint swelling. NEUROLOGIC:  Showed cranial nerves I through XII were intact. PSYCHIATRIC:  Depressed affect. LABORATORY DATA:  As above. ASSESSMENT:  Severe hypothyroidism, noncompliance, headache. The  patient seen by neurologist, impression was complicated basilar migraine  headache. PLAN:  Continue with Synthroid 300 mcg daily, the patient to be  discharged on the dose. Close monitoring of thyroid function test at  least every monthly for better compliance. Continue other supportive  measures in the meantime. Thank you for the consult.         Nataliia Smith MD    D: 12/22/2020 22:26:41       T: 12/22/2020 22:31:37     [email or web address]  Job#: [id number]     Doc#: [id number]    CC:

## 2020-12-23 NOTE — PROGRESS NOTES
Progress Note  Date:2020       Room:W265/W265-01  Patient Name:Nancy A Phoenix     YOB: 1975     Age:45 y.o. Was seen and evaluated. Still has headaches and blurry vision in the right side. Spoke with nurse about the care. Counseling given about compliant with hypothyroidism meds. Subjective    Subjective:  Symptoms:  She reports malaise, weakness and headache. No shortness of breath, cough, chest pain, chest pressure, anorexia, diarrhea or anxiety. Diet:  No nausea or vomiting. Review of Systems   Respiratory: Negative for cough and shortness of breath. Cardiovascular: Negative for chest pain. Gastrointestinal: Negative for anorexia, diarrhea, nausea and vomiting. Neurological: Positive for weakness. Objective         Vitals Last 24 Hours:  TEMPERATURE:  Temp  Av.7 °F (36.5 °C)  Min: 97.7 °F (36.5 °C)  Max: 97.7 °F (36.5 °C)  RESPIRATIONS RANGE: Resp  Av  Min: 18  Max: 18  PULSE OXIMETRY RANGE: SpO2  Av %  Min: 100 %  Max: 100 %  PULSE RANGE: Pulse  Av.5  Min: 53  Max: 56  BLOOD PRESSURE RANGE: Systolic (73BEM), QAK:571 , Min:122 , RNJ:999   ; Diastolic (23XHR), ITP:52, Min:71, Max:71    I/O (24Hr): Intake/Output Summary (Last 24 hours) at 2020 1252  Last data filed at 2020 1719  Gross per 24 hour   Intake 240 ml   Output --   Net 240 ml     Objective:  General Appearance:  Ill-appearing. Vital signs: (most recent): Blood pressure 122/71, pulse 53, temperature 97.7 °F (36.5 °C), temperature source Oral, resp. rate 18, height 4' 9\" (1.448 m), weight 189 lb 9.6 oz (86 kg), SpO2 100 %, not currently breastfeeding. HEENT: Normal HEENT exam.    Lungs:  Normal effort and normal respiratory rate. Heart: Normal rate. Regular rhythm. S1 normal and S2 normal.    Abdomen: Abdomen is soft. Bowel sounds are normal.   There is no epigastric area or suprapubic area tenderness. Pulses: Distal pulses are intact.     Neurological: Patient is alert and oriented to person, place and time. Pupils:  Pupils are equal, round, and reactive to light. Skin:  Warm and dry. Labs/Imaging/Diagnostics    Labs:  CBC:  Recent Labs     12/21/20  1330 12/22/20  0505 12/23/20  0452   WBC 5.7 6.8 7.7   RBC 4.02* 3.93* 3.85*   HGB 12.8 12.3 12.4   HCT 37.6 36.9* 36.4*   MCV 93.4 94.0 94.3   RDW 15.2* 15.4* 15.5*    300 306     CHEMISTRIES:  Recent Labs     12/21/20  1330 12/22/20  0505 12/23/20  0452   * 132* 130*   K 3.5 3.3* 3.4   CL 94* 95 92*   CO2 28 25 29   BUN 12 12 15   CREATININE 1.08* 0.98* 1.19*   GLUCOSE 183* 136* 115*   MG 2.3 2.4 2.7*     PT/INR:No results for input(s): PROTIME, INR in the last 72 hours. APTT:No results for input(s): APTT in the last 72 hours. LIVER PROFILE:  Recent Labs     12/21/20  1330   AST 30   ALT 16   BILITOT 0.4   ALKPHOS 61       Imaging Last 24 Hours:  Cta Head W Wo Contrast    Result Date: 12/23/2020  EXAMINATION: CTA HEAD W WO CONTRAST, CTA NECK W WO CONTRAST DATE AND TIME:12/22/2020 10:04 PM CLINICAL HISTORY: Stroke  basilar stenosis  COMPARISON: None TECHNIQUE:Helical CTA of the head was performed from the vertex to the foramen magnum following the uneventful intravenous administration of 100 cc of nonionic contrast without incident. 2-D images were reconstructed in the sagittal and coronal planes. Three Dimensional Maximum Intensity Projection (3D-MIP) images were created. All images were reviewed and primarily archived to PACS workstation. All CT scans at this facility use dose modulation, iterative reconstruction, and/or weight based dosing when appropriate to reduce radiation dose to as low as reasonably achievable. FINDINGS CTA HEAD: Intracranial ICAs: Flow is visualized within the precavernous, cavernous, clinoid and supraclinoid segments of the internal carotid arteries bilaterally    Anterior Cerebral Arteries: The bilaterals  A1 and A2 segments are patent.   Middle Cerebral Arteries: Bilateral horizontal, insular, opercular, and cortical segments of the right and left middle cerebral cerebral arteries are patent. Vertebral Arteries And Basilar Artery: There is adequate flow in the intracranial portions of the vertebral arteries and in the basilar artery. Posterior Cerebral Arteries: Bilateral posterior cerebral arteries are patent. There is a persistent fetal origin of the right posterior cerebral artery. Aneurysm No aneurysm or dissection in the anterior or posterior circulations. . Neurocranium The visualized neurocranium is intact. Dural Sinus: As visualized the opacified dural venous sinuses are unremarkable. The major intracranial arterial structures are patent without high-grade stenosis, large vessel cut off, or aneurysm. Specifically the basilar artery is normal course and caliber without aneurysm or stenosis EXAMINATION: CTA HEAD W WO CONTRAST, CTA NECK W WO CONTRAST DATE AND TIME:12/22/2020 10:04 PM CLINICAL HISTORY: Stroke symptoms   basilar stenosis  COMPARISON: None TECHNIQUE: Helical CTA of the neck was performed from the aortic arch to the foramen magnum following the uneventful intravenous administration of 100 cc of nonionic contrast without incident. 2-D images were reconstructed in the sagittal and coronal planes. Three Dimensional Maximum Intensity Projection (3D-MIP) images were created. All images were reviewed and primarily archived to PACS workstation. All CT scans at this facility use dose modulation, iterative reconstruction, and/or weight based dosing when appropriate to reduce radiation dose to as low as reasonably achievable. NASCET Criteria were utilized FINDINGS CTA NECK: Aortic Arch: The visualized portions of the aortic arch and proximal arch vessels demonstrates no focal stenosis aneurysm or dissection. Carotids: The common carotid arteries, carotid bifurcations, internal and external carotid arteries are normal in course and caliber.  Right  Proximal Internal Carotid Stenosis (% by NASCET Criteria): There is no hemodynamically significant stenosis. Left  Proximal Internal Carotid Stenosis (% by NASCET Criteria): There is no hemodynamically significant stenosis. Vertebral Arteries: Patency: The vertebral arteries are well visualized to the level of the basilar artery. There is no focal stenosis aneurysm or dissection. Vertebral arteries are codominant. IMPRESSION: Negative CTA of the neck. Cta Neck W Wo Contrast    Result Date: 12/23/2020  EXAMINATION: CTA HEAD W WO CONTRAST, CTA NECK W WO CONTRAST DATE AND TIME:12/22/2020 10:04 PM CLINICAL HISTORY: Stroke  basilar stenosis  COMPARISON: None TECHNIQUE:Helical CTA of the head was performed from the vertex to the foramen magnum following the uneventful intravenous administration of 100 cc of nonionic contrast without incident. 2-D images were reconstructed in the sagittal and coronal planes. Three Dimensional Maximum Intensity Projection (3D-MIP) images were created. All images were reviewed and primarily archived to PACS workstation. All CT scans at this facility use dose modulation, iterative reconstruction, and/or weight based dosing when appropriate to reduce radiation dose to as low as reasonably achievable. FINDINGS CTA HEAD: Intracranial ICAs: Flow is visualized within the precavernous, cavernous, clinoid and supraclinoid segments of the internal carotid arteries bilaterally    Anterior Cerebral Arteries: The bilaterals  A1 and A2 segments are patent. Middle Cerebral Arteries: Bilateral horizontal, insular, opercular, and cortical segments of the right and left middle cerebral cerebral arteries are patent. Vertebral Arteries And Basilar Artery: There is adequate flow in the intracranial portions of the vertebral arteries and in the basilar artery. Posterior Cerebral Arteries: Bilateral posterior cerebral arteries are patent.  There is a persistent fetal origin of the right posterior cerebral artery. Aneurysm No aneurysm or dissection in the anterior or posterior circulations. . Neurocranium The visualized neurocranium is intact. Dural Sinus: As visualized the opacified dural venous sinuses are unremarkable. The major intracranial arterial structures are patent without high-grade stenosis, large vessel cut off, or aneurysm. Specifically the basilar artery is normal course and caliber without aneurysm or stenosis EXAMINATION: CTA HEAD W WO CONTRAST, CTA NECK W WO CONTRAST DATE AND TIME:12/22/2020 10:04 PM CLINICAL HISTORY: Stroke symptoms   basilar stenosis  COMPARISON: None TECHNIQUE: Helical CTA of the neck was performed from the aortic arch to the foramen magnum following the uneventful intravenous administration of 100 cc of nonionic contrast without incident. 2-D images were reconstructed in the sagittal and coronal planes. Three Dimensional Maximum Intensity Projection (3D-MIP) images were created. All images were reviewed and primarily archived to PACS workstation. All CT scans at this facility use dose modulation, iterative reconstruction, and/or weight based dosing when appropriate to reduce radiation dose to as low as reasonably achievable. NASCET Criteria were utilized FINDINGS CTA NECK: Aortic Arch: The visualized portions of the aortic arch and proximal arch vessels demonstrates no focal stenosis aneurysm or dissection. Carotids: The common carotid arteries, carotid bifurcations, internal and external carotid arteries are normal in course and caliber. Right  Proximal Internal Carotid Stenosis (% by NASCET Criteria): There is no hemodynamically significant stenosis. Left  Proximal Internal Carotid Stenosis (% by NASCET Criteria): There is no hemodynamically significant stenosis. Vertebral Arteries: Patency: The vertebral arteries are well visualized to the level of the basilar artery. There is no focal stenosis aneurysm or dissection.  Vertebral arteries are codominant. IMPRESSION: Negative CTA of the neck. Mri Brain Wo Contrast    Result Date: 12/22/2020  EXAMINATION:  MRI BRAIN WO CONTRAST HISTORY:   stroke . G43.809 Migraine variant ICD10 TECHNIQUE:  Routine noncontrast MRI protocol including diffusion and gradient echo images. COMPARISON: CT head 12/21/2020 RESULT: Acute Change: There is no evidence of restricted diffusion to suggest an acute infarct. Hemorrhage:    No evidence of prior parenchymal hemorrhage. Mass Lesion/ Mass Effect:    No evidence of an intracranial mass or extra-axial fluid collection. No significant mass effect. Chronic Change:  Scattered punctate foci of increased T2 and FLAIR signal are noted in the supratentorial white matter which is a nonspecific finding, but likely represents minimal chronic microvascular ischemia. Parenchyma:   No significant volume loss for age. The brain parenchyma is otherwise within normal limits of signal intensity and morphology. Ventricles:     Normal caliber and morphology. Skull Base:    Hypothalamic and pituitary region are grossly normal.   Craniocervical junction is normal. No significant marrow replacement process. Vasculature:    Major intracranial arterial structures, and dural venous sinuses show typical flow void, suggesting patency by spin echo criteria. Other:  Polyps or mucous retention cysts in the maxillary sinuses. Mild mucosal thickening within the ethmoid air cells. Trace fluid within the mastoid air cells, nonspecific. The orbits are unremarkable. Soft tissues are unremarkable. No acute intracranial process. Assessment//Plan           Hospital Problems           Last Modified POA    Migraine variant 12/21/2020 Yes    Hypothyroidism 12/22/2020 Yes        Assessment & Plan  1) migraine HA with visual changes  2) DM2  3) Uncontrolled Hypothyroidism  Continue with Imitrex and meclizine. Follow-up neurology. Follow-up imaging studies.  Out of bed to chair, fall precautions.  Cw/ SSI, Hgba1c is 6.8    Electronically signed by Lonnie Saini MD on 12/23/20 at 12:52 PM EST

## 2020-12-24 LAB
ANION GAP SERPL CALCULATED.3IONS-SCNC: 9 MEQ/L (ref 9–15)
BASOPHILS ABSOLUTE: 0.1 K/UL (ref 0–0.2)
BASOPHILS RELATIVE PERCENT: 0.9 %
BUN BLDV-MCNC: 12 MG/DL (ref 6–20)
CALCIUM SERPL-MCNC: 8.4 MG/DL (ref 8.5–9.9)
CHLORIDE BLD-SCNC: 97 MEQ/L (ref 95–107)
CK MB: 5.2 NG/ML (ref 0–3.8)
CO2: 28 MEQ/L (ref 20–31)
CREAT SERPL-MCNC: 0.94 MG/DL (ref 0.5–0.9)
CREATINE KINASE-MB INDEX: 1.2 % (ref 0–3.5)
DOUBLE STRANDED DNA AB, IGG: NORMAL
EOSINOPHILS ABSOLUTE: 0.3 K/UL (ref 0–0.7)
EOSINOPHILS RELATIVE PERCENT: 4.5 %
GFR AFRICAN AMERICAN: >60
GFR NON-AFRICAN AMERICAN: >60
GLUCOSE BLD-MCNC: 109 MG/DL (ref 60–115)
GLUCOSE BLD-MCNC: 119 MG/DL (ref 60–115)
GLUCOSE BLD-MCNC: 120 MG/DL (ref 70–99)
GLUCOSE BLD-MCNC: 142 MG/DL (ref 60–115)
GLUCOSE BLD-MCNC: 155 MG/DL (ref 60–115)
HCT VFR BLD CALC: 35.7 % (ref 37–47)
HEMOGLOBIN: 12 G/DL (ref 12–16)
LYMPHOCYTES ABSOLUTE: 1.6 K/UL (ref 1–4.8)
LYMPHOCYTES RELATIVE PERCENT: 21.2 %
MAGNESIUM: 2.3 MG/DL (ref 1.7–2.4)
MCH RBC QN AUTO: 31.4 PG (ref 27–31.3)
MCHC RBC AUTO-ENTMCNC: 33.6 % (ref 33–37)
MCV RBC AUTO: 93.4 FL (ref 82–100)
MONOCYTES ABSOLUTE: 0.5 K/UL (ref 0.2–0.8)
MONOCYTES RELATIVE PERCENT: 7 %
NEUTROPHILS ABSOLUTE: 4.9 K/UL (ref 1.4–6.5)
NEUTROPHILS RELATIVE PERCENT: 66.4 %
PDW BLD-RTO: 14.9 % (ref 11.5–14.5)
PERFORMED ON: ABNORMAL
PERFORMED ON: NORMAL
PLATELET # BLD: 274 K/UL (ref 130–400)
POTASSIUM REFLEX MAGNESIUM: 2.8 MEQ/L (ref 3.4–4.9)
RBC # BLD: 3.82 M/UL (ref 4.2–5.4)
SODIUM BLD-SCNC: 134 MEQ/L (ref 135–144)
TOTAL CK: 432 U/L (ref 0–170)
WBC # BLD: 7.3 K/UL (ref 4.8–10.8)

## 2020-12-24 PROCEDURE — 2580000003 HC RX 258: Performed by: INTERNAL MEDICINE

## 2020-12-24 PROCEDURE — 6370000000 HC RX 637 (ALT 250 FOR IP): Performed by: INTERNAL MEDICINE

## 2020-12-24 PROCEDURE — 36415 COLL VENOUS BLD VENIPUNCTURE: CPT

## 2020-12-24 PROCEDURE — 85025 COMPLETE CBC W/AUTO DIFF WBC: CPT

## 2020-12-24 PROCEDURE — 82550 ASSAY OF CK (CPK): CPT

## 2020-12-24 PROCEDURE — 1210000000 HC MED SURG R&B

## 2020-12-24 PROCEDURE — 80048 BASIC METABOLIC PNL TOTAL CA: CPT

## 2020-12-24 PROCEDURE — 99233 SBSQ HOSP IP/OBS HIGH 50: CPT | Performed by: PSYCHIATRY & NEUROLOGY

## 2020-12-24 PROCEDURE — 6360000002 HC RX W HCPCS: Performed by: INTERNAL MEDICINE

## 2020-12-24 PROCEDURE — 83735 ASSAY OF MAGNESIUM: CPT

## 2020-12-24 PROCEDURE — 6370000000 HC RX 637 (ALT 250 FOR IP): Performed by: PSYCHIATRY & NEUROLOGY

## 2020-12-24 PROCEDURE — 82553 CREATINE MB FRACTION: CPT

## 2020-12-24 PROCEDURE — 97116 GAIT TRAINING THERAPY: CPT

## 2020-12-24 PROCEDURE — 97535 SELF CARE MNGMENT TRAINING: CPT

## 2020-12-24 RX ORDER — POTASSIUM CHLORIDE 20 MEQ/1
40 TABLET, EXTENDED RELEASE ORAL EVERY 4 HOURS
Status: COMPLETED | OUTPATIENT
Start: 2020-12-24 | End: 2020-12-24

## 2020-12-24 RX ADMIN — SODIUM CHLORIDE: 9 INJECTION, SOLUTION INTRAVENOUS at 16:07

## 2020-12-24 RX ADMIN — INSULIN LISPRO 1 UNITS: 100 INJECTION, SOLUTION INTRAVENOUS; SUBCUTANEOUS at 12:17

## 2020-12-24 RX ADMIN — CETIRIZINE HYDROCHLORIDE 10 MG: 10 TABLET, FILM COATED ORAL at 08:59

## 2020-12-24 RX ADMIN — HYDROXYZINE PAMOATE 25 MG: 25 CAPSULE ORAL at 16:05

## 2020-12-24 RX ADMIN — SUMATRIPTAN SUCCINATE 100 MG: 100 TABLET ORAL at 08:58

## 2020-12-24 RX ADMIN — ACETAMINOPHEN 650 MG: 325 TABLET ORAL at 20:31

## 2020-12-24 RX ADMIN — MECLIZINE HYDROCHLORIDE 25 MG: 25 TABLET ORAL at 08:59

## 2020-12-24 RX ADMIN — HYDROXYZINE PAMOATE 25 MG: 25 CAPSULE ORAL at 20:31

## 2020-12-24 RX ADMIN — ENOXAPARIN SODIUM 40 MG: 100 INJECTION SUBCUTANEOUS at 09:00

## 2020-12-24 RX ADMIN — FLUTICASONE PROPIONATE 1 SPRAY: 50 SPRAY, METERED NASAL at 12:14

## 2020-12-24 RX ADMIN — POTASSIUM CHLORIDE 40 MEQ: 20 TABLET, EXTENDED RELEASE ORAL at 16:05

## 2020-12-24 RX ADMIN — POTASSIUM CHLORIDE 40 MEQ: 20 TABLET, EXTENDED RELEASE ORAL at 12:18

## 2020-12-24 RX ADMIN — LEVOTHYROXINE SODIUM 300 MCG: 0.1 TABLET ORAL at 05:46

## 2020-12-24 RX ADMIN — HYDROXYZINE PAMOATE 25 MG: 25 CAPSULE ORAL at 08:59

## 2020-12-24 RX ADMIN — MECLIZINE HYDROCHLORIDE 25 MG: 25 TABLET ORAL at 20:32

## 2020-12-24 RX ADMIN — SODIUM CHLORIDE: 9 INJECTION, SOLUTION INTRAVENOUS at 05:45

## 2020-12-24 RX ADMIN — ACETAMINOPHEN 650 MG: 325 TABLET ORAL at 05:45

## 2020-12-24 RX ADMIN — POTASSIUM CHLORIDE 40 MEQ: 20 TABLET, EXTENDED RELEASE ORAL at 09:00

## 2020-12-24 RX ADMIN — MECLIZINE HYDROCHLORIDE 25 MG: 25 TABLET ORAL at 16:05

## 2020-12-24 ASSESSMENT — ENCOUNTER SYMPTOMS
SHORTNESS OF BREATH: 0
CHOKING: 0
CONSTIPATION: 0
DIARRHEA: 0
COLOR CHANGE: 0
NAUSEA: 1
BACK PAIN: 0
PHOTOPHOBIA: 0
COUGH: 0
TROUBLE SWALLOWING: 0
VOMITING: 0

## 2020-12-24 ASSESSMENT — PAIN SCALES - GENERAL
PAINLEVEL_OUTOF10: 5
PAINLEVEL_OUTOF10: 0
PAINLEVEL_OUTOF10: 0
PAINLEVEL_OUTOF10: 5
PAINLEVEL_OUTOF10: 3

## 2020-12-24 NOTE — PROGRESS NOTES
Hospitalist Progress Note      PCP: Shlomo Cr PA-C    Date of Admission: 12/21/2020    Chief Complaint:  No acute events, afebrile, stable HD, improving overall, still feels weak    Medications:  Reviewed    Infusion Medications    sodium chloride 100 mL/hr at 12/24/20 0545    dextrose       Scheduled Medications    potassium chloride  40 mEq Oral Q4H    hydrOXYzine  25 mg Oral TID    meclizine  25 mg Oral TID    fluticasone  1 spray Nasal Daily    cetirizine  10 mg Oral Daily    [Held by provider] atorvastatin  40 mg Oral Daily    levothyroxine  300 mcg Oral Daily    sodium chloride flush  10 mL Intravenous 2 times per day    enoxaparin  40 mg Subcutaneous Daily    insulin lispro  0-6 Units Subcutaneous TID WC    insulin lispro  0-3 Units Subcutaneous Nightly     PRN Meds: prochlorperazine, sodium chloride flush, promethazine **OR** ondansetron, polyethylene glycol, acetaminophen **OR** acetaminophen, glucose, dextrose, glucagon (rDNA), dextrose      Intake/Output Summary (Last 24 hours) at 12/24/2020 1117  Last data filed at 12/23/2020 1900  Gross per 24 hour   Intake 480 ml   Output 300 ml   Net 180 ml       Exam:    BP (!) 106/58   Pulse 57   Temp 98.2 °F (36.8 °C) (Oral)   Resp 18   Ht 4' 9\" (1.448 m)   Wt 189 lb 9.6 oz (86 kg)   SpO2 98%   BMI 41.03 kg/m²     General appearance: appears stated age and cooperative. Respiratory: clear to auscultation bilaterally. Cardiovascular: Regular rate and rhythm with normal S1/S2. Abdomen: Soft, active bowel sounds. Musculoskeletal: No edema bilaterally.      Labs:   Recent Labs     12/22/20  0505 12/23/20  0452 12/24/20  0515   WBC 6.8 7.7 7.3   HGB 12.3 12.4 12.0   HCT 36.9* 36.4* 35.7*    306 274     Recent Labs     12/22/20  0505 12/23/20  0452 12/24/20  0515   * 130* 134*   K 3.3* 3.4 2.8*   CL 95 92* 97   CO2 25 29 28   BUN 12 15 12   CREATININE 0.98* 1.19* 0.94*   CALCIUM 8.4* 9.0 8.4*     Recent Labs 12/21/20  1330   AST 30   ALT 16   BILITOT 0.4   ALKPHOS 61     No results for input(s): INR in the last 72 hours. Recent Labs     12/22/20  1116 12/23/20  0452 12/24/20  0515   CKTOTAL 638* 563* 432*       Urinalysis:    No results found for: Ila Li, BACTERIA, RBCUA, BLOODU, SPECGRAV, [address]    Radiology:  CTA HEAD W WO CONTRAST   Final Result   The major intracranial arterial structures are patent without high-grade stenosis, large vessel cut off, or aneurysm. Specifically the basilar artery is normal course and caliber without aneurysm or stenosis         EXAMINATION: CTA HEAD W WO CONTRAST, CTA NECK W WO CONTRAST      DATE AND TIME:12/22/2020 10:04 PM      CLINICAL HISTORY: Stroke symptoms   basilar stenosis        COMPARISON: None      TECHNIQUE: Helical CTA of the neck was performed from the aortic arch to the foramen magnum following the uneventful intravenous administration of 100 cc of nonionic contrast without incident. 2-D images were reconstructed in the sagittal and coronal    planes. Three Dimensional Maximum Intensity Projection (3D-MIP) images were created. All images were reviewed and primarily archived to PACS workstation. All CT scans at this facility use dose modulation, iterative reconstruction, and/or weight based    dosing when appropriate to reduce radiation dose to as low as reasonably achievable. NASCET Criteria were utilized      FINDINGS CTA NECK:      Aortic Arch: The visualized portions of the aortic arch and proximal arch vessels demonstrates no focal stenosis aneurysm or dissection. Carotids: The common carotid arteries, carotid bifurcations, internal and external carotid arteries are normal in course and caliber. Right  Proximal Internal Carotid Stenosis (% by NASCET Criteria): There is no hemodynamically significant stenosis. Left  Proximal Internal Carotid Stenosis (% by NASCET Criteria): There is no hemodynamically significant stenosis. Vertebral Arteries:      Patency: The vertebral arteries are well visualized to the level of the basilar artery. There is no focal stenosis aneurysm or dissection. Vertebral arteries are codominant. IMPRESSION: Negative CTA of the neck. CTA NECK W WO CONTRAST   Final Result   The major intracranial arterial structures are patent without high-grade stenosis, large vessel cut off, or aneurysm. Specifically the basilar artery is normal course and caliber without aneurysm or stenosis         EXAMINATION: CTA HEAD W WO CONTRAST, CTA NECK W WO CONTRAST      DATE AND TIME:12/22/2020 10:04 PM      CLINICAL HISTORY: Stroke symptoms   basilar stenosis        COMPARISON: None      TECHNIQUE: Helical CTA of the neck was performed from the aortic arch to the foramen magnum following the uneventful intravenous administration of 100 cc of nonionic contrast without incident. 2-D images were reconstructed in the sagittal and coronal    planes. Three Dimensional Maximum Intensity Projection (3D-MIP) images were created. All images were reviewed and primarily archived to PACS workstation. All CT scans at this facility use dose modulation, iterative reconstruction, and/or weight based    dosing when appropriate to reduce radiation dose to as low as reasonably achievable. NASCET Criteria were utilized      FINDINGS CTA NECK:      Aortic Arch: The visualized portions of the aortic arch and proximal arch vessels demonstrates no focal stenosis aneurysm or dissection. Carotids: The common carotid arteries, carotid bifurcations, internal and external carotid arteries are normal in course and caliber. Right  Proximal Internal Carotid Stenosis (% by NASCET Criteria): There is no hemodynamically significant stenosis. Left  Proximal Internal Carotid Stenosis (% by NASCET Criteria): There is no hemodynamically significant stenosis. Vertebral Arteries:      Patency:  The vertebral arteries are well visualized to the level of the basilar artery. There is no focal stenosis aneurysm or dissection. Vertebral arteries are codominant. IMPRESSION: Negative CTA of the neck. MRI BRAIN WO CONTRAST   Final Result      No acute intracranial process. CT Head WO Contrast   Final Result      No acute intracranial process. Paranasal sinus disease.                  Assessment/Plan:    38-year-old female with history of obesity, DM2, hypothyroidism who presented with severe frontal headache with associated right side vision blurriness and right-sided tingling.     Complicated migraine  - CTA and MRI of the head and neck were negative for acute findings  - managed by neurology    Uncontrolled hypothyroidism  - due to noncompliance  - resumed Synthroid  - followed by endocrinology    Hypokalemia   - replaced     DM2  - controlled    Hyperlipidemia   - uncontrolled, continue home meds       [facility]                 Electronically signed by Nazario Menard MD on 12/24/2020 at 11:17 AM

## 2020-12-24 NOTE — PROGRESS NOTES
Physical Therapy Med Surg Daily Treatment Note  [facility]       NAME: Haseeb Leiva  : 1975 (50 y.o.)  MRN: [id number]  CODE STATUS: Full Code    Date of Service: 2020    Patient Diagnosis(es): Migraine variant [G43.809]  Hypothyroidism [E03.9]   Chief Complaint   Patient presents with    Headache     Patient Active Problem List    Diagnosis Date Noted    Complicated migraine     Dizziness     Benign paroxysmal positional vertigo due to bilateral vestibular disorder     Ataxic gait     Hypothyroidism 2020    Migraine variant 2020    Acute otitis media with perforation, right 2019    History of perforation of tympanic membrane 2019    Essential hypertension 10/25/2016    Acquired hypothyroidism 10/25/2016    Type 2 diabetes mellitus without complication, without long-term current use of insulin ([address] 10/25/2016        Past Medical History:   Diagnosis Date    Asthma     Depression     Endometriosis 2017    Hypothyroidism     Postmenopausal 2008    Type II diabetes mellitus, uncontrolled ([address]      Past Surgical History:   Procedure Laterality Date    DENTAL SURGERY      ENDOMETRIAL BIOPSY  2017    Alice lala    INNER EAR SURGERY Right 1993    ear perforation    ROTATOR CUFF REPAIR Left 2014    TONSILLECTOMY AND ADENOIDECTOMY      TYMPANOSTOMY TUBE PLACEMENT         Restrictions  Restrictions/Precautions: Fall Risk(history of vertigo)    SUBJECTIVE   General  Chart Reviewed: Yes  Family / Caregiver Present: No  Subjective  Subjective: Pt states \"I walked to the bathroom with my nurse. It was the first time I walked that far.  I really think I need another day before going home\"    Pre-Session Pain Report  Pre Treatment Pain Screening  Pain at present: 0  Scale Used: Numeric Score  Pain Screening  Patient Currently in Pain: Denies     Post-Session Pain Report  Pain Assessment  Pain Level: 0 OBJECTIVE   Orientation  Overall Orientation Status: Within Functional Limits    Bed mobility  Supine to Sit: Unable to assess(pt sitting up in chair upon arrival)  Sit to Supine: Stand by assistance    Transfers  Sit to Stand: Contact guard assistance  Stand to sit: Contact guard assistance    Ambulation  Ambulation?: Yes  Ambulation 1  Surface: level tile  Device: Rolling Walker  Assistance: Contact guard assistance  Distance: 10ft  Comments: Pt c/o \"I see 3 of you. \" Pt states that she has double vision in R eye. Pt instructed to perform mobility with R eye closed to decrease inaccurate visual input. Pt instructed in recommendation to obtain 2ww and 24 hr assist from spouse at home if she is home going at D/C     Balance  Posture: Fair  Sitting - Static: Good;-  Sitting - Dynamic: Good;-(1-2 UE support)  Standing - Static: Fair(B UE support required)  Standing - Dynamic: Fair;-(CGA with need for 2ww to ambulate short in room distances)         Activity Tolerance  Activity Tolerance: Patient Tolerated treatment well. Bilateral nystagmus noted, dizziness not appear to be positional in nature based on position changes observed this date. Pt would benefit from full vestibular PT eval.        ASSESSMENT   Assessment: Pt's functional mobility is limited by her reports of dizziness. Trialed ambulation with R eye closed to decrease inaccurate sensory input. Pt reports that she is eager to return home and is not interested in therapy stay. Pt ed in recommendation for use of 2ww and 24 hr family care d/t high falls risk at this time. Pt has 2nd floor bedroom and bathroom. Pt would benefit from bedside commode to allow for 1st floor set up upon retur home. Discharge Recommendations:  Continue to assess pending progress, Patient would benefit from continued therapy after discharge    Goals  Long term goals  Long term goal 1: Pt will demonstrate bed mobility indep with minimal dizziness symptoms for safe d/c home.   Long term goal 2: Pt will demonstrate transfers indep with minimal dizziness symptoms to allow for safe return home. Long term goal 3: Pt will demonstrate amb 150 ft indep without AD to allow for safe amb inside pt's home at  [facility]. Long term goal 4: Pt will demonstrate stair negotiation 1 step indep to allow pt to safely enter and exit her home. Patient Goals   Patient goals : \"return home\"    PLAN    Times per week: 3-6  Plan Comment: continue PT POC  Safety Devices  Type of devices: Left in bed, Call light within reach, Bed alarm in place     [facility] (6 CLICK) [address] Raw Score : 18     Therapy Time   Individual   Time In 0910   Time Out 0933   Minutes [place], [place], 12/24/20 at 9:41 AM         Definitions for assistance levels  Independent = pt does not require any physical supervision or assistance from another person for activity completion. Device may be needed.   Stand by assistance = pt requires verbal cues or instructions from another person, close to but not touching, to perform the activity  Minimal assistance= pt performs 75% or more of the activity; assistance is required to complete the activity  Moderate assistance= pt performs 50% of the activity; assistance is required to complete the activity  Maximal assistance = pt performs 25% of the activity; assistance is required to complete the activity  Dependent = pt requires total physical assistance to accomplish the task

## 2020-12-24 NOTE — PROGRESS NOTES
[facility] Neurology Daily Progress Note  Name: Kaylen Zhang  Age: 50 y.o. Gender: female  CodeStatus: Full Code  Allergies: No Known Allergies    Chief Complaint:Headache    Primary Care Provider: Verner Bunkers, PA-C  InpatientTreatment Team: Treatment Team: Attending Provider: Byron Garibya MD; Consulting Physician: Madelin Kohli MD; Consulting Physician: Silas Josue MD; Utilization Reviewer: Kam Em RN; Patient Care Tech: Johanne Michael; Patient Care Tech: Jesse Ledezma; Registered Nurse: Malik Lerma RN  Admission Date: 12/21/2020      Headache   Associated symptoms include dizziness, nausea and weakness. Pertinent negatives include no back pain, coughing, ear pain, fever, neck pain, numbness, photophobia, seizures, tinnitus or vomiting. HPI 50 right-handed female who presents with a headache she has bifrontal headache starting last night. She also had considerable vertigo with the same. Patient went to bed and awoke with a headache and felt drunk. Is under a large amount of stress lately financially. She does have history of vertigo and headaches in the past.  The headaches can last for a few hours or sometimes few days she has not been seen by neurology she is not on any preventive medications. She has been vomiting with a headache and hurts badly. Also reported some tingling of the right side of the face and upper extremity. She is not complaining of this at this time. She reports her walking is off since this has occurred. She is not able to walk but is good strength in the legs. She has photosensitivity. Denies any hearing loss or tinnitus or recent respiratory tract infections. She has previous history of otitis media with perforation. Reviewed results from CTA of head and neck are still pending. MRI of head is negative. Patient is experiencing increased vertigo today and has a headache. Patient is a poor historian.  States that she did not take the Ativert or hydroxyzine prescribed to her yesterday because the symptoms resolved on their own. However, the EMR and nursing shows she had 2 doses of hydroxyzine and meclizine as well as 1 dose of Imitrex yesterday. Is still having some dizziness with gait ataxia. Findings were discussed with the physical therapist and the nurse and she walks with an end-stage abasia gait. Patient requires a walker. CT angiograms were reviewed    Vitals:    12/24/20 0858   BP: (!) 106/58   Pulse: 57   Resp: 18   Temp:    SpO2:       Review of Systems   Constitutional: Negative for chills and fever. HENT: Negative for congestion, ear pain, tinnitus and trouble swallowing. Eyes: Negative for photophobia and visual disturbance. Respiratory: Negative for cough, choking and shortness of breath. Cardiovascular: Negative for chest pain and palpitations. Gastrointestinal: Positive for nausea. Negative for constipation, diarrhea and vomiting. Musculoskeletal: Positive for gait problem. Negative for back pain, joint swelling, myalgias, neck pain and neck stiffness. Skin: Negative for color change. Allergic/Immunologic: Negative for food allergies. Neurological: Positive for dizziness, weakness, light-headedness and headaches. Negative for tremors, seizures, syncope, facial asymmetry, speech difficulty and numbness. Psychiatric/Behavioral: Negative. Negative for behavioral problems, confusion, hallucinations and sleep disturbance. as above  Physical Exam  Vitals signs and nursing note reviewed. Constitutional:       Appearance: Normal appearance. HENT:      Head: Normocephalic and atraumatic. Eyes:      Conjunctiva/sclera: Conjunctivae normal.      Pupils: Pupils are equal, round, and reactive to light. Comments: Has bilateral nystagmus   Neck:      Musculoskeletal: Normal range of motion. Cardiovascular:      Rate and Rhythm: Normal rate and regular rhythm. Heart sounds: No murmur. Pulmonary:      Effort: Pulmonary effort is normal.      Breath sounds: Normal breath sounds. Abdominal:      General: Bowel sounds are normal.   Musculoskeletal: Normal range of motion. Skin:     General: Skin is warm and dry. Neurological:      General: No focal deficit present. Mental Status: She is alert and oriented to person, place, and time. Cranial Nerves: No cranial nerve deficit. Sensory: No sensory deficit. Motor: No abnormal muscle tone. Coordination: Coordination normal.      Deep Tendon Reflexes: Reflexes are normal and symmetric. Babinski sign absent on the right side. Babinski sign absent on the left side. Psychiatric:         Mood and Affect: Mood normal.       Neurologic Exam     Mental Status   Oriented to person, place, and time. Cranial Nerves     CN III, IV, VI   Pupils are equal, round, and reactive to light. In addition to the above patient has a astasia-abasia gait.   Medications:  Reviewed    Infusion Medications:    sodium chloride 100 mL/hr at 12/24/20 0545    dextrose       Scheduled Medications:    potassium chloride  40 mEq Oral Q4H    hydrOXYzine  25 mg Oral TID    meclizine  25 mg Oral TID    fluticasone  1 spray Nasal Daily    cetirizine  10 mg Oral Daily    [Held by provider] atorvastatin  40 mg Oral Daily    levothyroxine  300 mcg Oral Daily    sodium chloride flush  10 mL Intravenous 2 times per day    enoxaparin  40 mg Subcutaneous Daily    insulin lispro  0-6 Units Subcutaneous TID WC    insulin lispro  0-3 Units Subcutaneous Nightly     PRN Meds: prochlorperazine, sodium chloride flush, promethazine **OR** ondansetron, polyethylene glycol, acetaminophen **OR** acetaminophen, glucose, dextrose, glucagon (rDNA), dextrose    Labs:   Recent Labs     12/22/20  0505 12/23/20  0452 12/24/20  0515   WBC 6.8 7.7 7.3   HGB 12.3 12.4 12.0   HCT 36.9* 36.4* 35.7*    306 274     Recent Labs     12/22/20  0505 12/23/20  0452 12/24/20  0515   * 130* 134*   K 3.3* 3.4 2.8*   CL 95 92* 97   CO2 25 29 28   BUN 12 15 12   CREATININE 0.98* 1.19* 0.94*   CALCIUM 8.4* 9.0 8.4*     Recent Labs     12/21/20  1330   AST 30   ALT 16   BILITOT 0.4   ALKPHOS 61     No results for input(s): INR in the last 72 hours. Recent Labs     12/22/20  1116 12/23/20  0452 12/24/20  0515   CKTOTAL 638* 563* 432*       Urinalysis:   No results found for: Bonna Greet, BACTERIA, RBCUA, BLOODU, SPECGRAV, [address]    Radiology:   Most recent    EEG No procedure found. MRI of Brain No results found for this or any previous visit. Results for orders placed during the hospital encounter of 12/21/20   MRI BRAIN WO CONTRAST    Narrative EXAMINATION:  MRI BRAIN WO CONTRAST    HISTORY:   stroke . G43.809 Migraine variant ICD10    TECHNIQUE:  Routine noncontrast MRI protocol including diffusion and gradient echo images. COMPARISON: CT head 12/21/2020      RESULT:    Acute Change: There is no evidence of restricted diffusion to suggest an acute infarct. Hemorrhage:    No evidence of prior parenchymal hemorrhage. Mass Lesion/ Mass Effect:    No evidence of an intracranial mass or extra-axial fluid collection. No significant mass effect. Chronic Change:  Scattered punctate foci of increased T2 and FLAIR signal are noted in the supratentorial white matter which is a nonspecific finding, but likely represents minimal chronic microvascular ischemia. Parenchyma:   No significant volume loss for age. The brain parenchyma is otherwise within normal limits of signal intensity and morphology. Ventricles:     Normal caliber and morphology. Skull Base:    Hypothalamic and pituitary region are grossly normal.   Craniocervical junction is normal. No significant marrow replacement process. Vasculature:    Major intracranial arterial structures, and dural venous sinuses show typical flow void, suggesting patency by spin echo criteria. Other:  Polyps or mucous retention cysts in the maxillary sinuses. Mild mucosal thickening within the ethmoid air cells. Trace fluid within the mastoid air cells, nonspecific. The orbits are unremarkable. Soft tissues are unremarkable. Impression No acute intracranial process. MRA of the Head and Neck: No results found for this or any previous visit. No results found for this or any previous visit. No results found for this or any previous visit. CT of the Head:   Results for orders placed during the hospital encounter of 12/21/20   CT Head WO Contrast    Narrative EXAMINATION:  CT HEAD WO CONTRAST    HISTORY:   frontal headache and vertigo    Vomiting     TECHNIQUE:  Serial axial images without IV contrast were obtained from the vertex to the foramen magnum. Sagittal and coronal reconstructions. All CT scans at this facility use dose modulation, iterative reconstruction, and/or weight based dosing when appropriate to reduce radiation dose to as low as reasonably achievable. COMPARISON:  CT head 9/27/2013      RESULT:    Acute change:   No evidence of an acute infarct or other acute parenchymal process. Hemorrhage:    No evidence of acute intracranial hemorrhage. Mass Lesion / Mass Effect:   There is no evidence of an intracranial mass or extraaxial fluid collection. No significant mass effect. Chronic change:   None apparent. Parenchyma: There is no significant volume loss for age. The brain parenchyma is otherwise within normal limits for age. Ventricles: The ventricles are within normal limits of size and configuration for age. Paranasal sinuses and skull base:  Mild paranasal sinus disease in the visualized sinuses with areas of polypoid thickening. Mastoid air cells are clear. The skull base is unremarkable. Soft tissues unremarkable. Impression No acute intracranial process.      Paranasal sinus disease. No results found for this or any previous visit. No results found for this or any previous visit. Carotid duplex: No results found for this or any previous visit. No results found for this or any previous visit. No results found for this or any previous visit. Echo No results found for this or any previous visit. Assessment/Plan:    Complicated basilar migraine with headaches and vertigo or truly in isolation these are migraines associated with possible Ménière's disease. An underlying cerebellar infarct must be ruled out given her other findings of significant nausea and vomiting. She also complains of gait ataxia which is new and therefore evaluation for cerebrovascular event. Now we will start her on hydroxyzine and Antivert together and a few doses of Imitrex unless she has a stroke then this could be contraindicated. Patient has few risk factors for cerebrovascular disease which could be contributing. Depending on the results of the same will further advise. Of note patient does not have any myelopathic signs to suggest spinal cord etiology. MRI brain normal.  ESR 42      CK-MB 7.1  - Endo on consult    Patient was given Antivert and hydroxyzine yesterday 2 times which seemed to improve her symptoms. She reports doses today reduced the frequency and severity of symptoms. Was found to have severe hypothyroidism and endocrinology is on consult. Was started on 300 mcg of synthroid today. Patient is still having episodes of vertigo throughout the day and remains a fall risk. Hypothyroidism may be the cause or a contributing factor to her vertigo, although improvement with antihistamines and imitrex could point toward BPPV or atypical migraine. There may be several processes causing her symptoms. Will continue to monitor and may consider Epley maneuvers in the future if symptoms do not resolve after thyroid corrected.      She is showing some improvement but is still not baseline we will keep her more for 1 day and then discharged her to home with a walker. CT angiograms were reviewed and does not show any basilar artery stenosis or any large vessel disease. MRI of the brain is normal.  She continues on Vistaril and Antivert for now. Her headache is better. She will continue on Imitrex as required. Moe Ma MD, [place] Ni, American Board of Psychiatry & Neurology  Board Certified in Vascular Neurology  Board Certified in Neuromuscular Medicine  Certified in [address]         Electronically signed by Conseulo Mckeon MD on 12/24/2020 at 12:36 PM

## 2020-12-24 NOTE — PROGRESS NOTES
Patient assisted up to the bathroom with walker, multiple queing given on how to use the walker safely, ambulated to the chair after, gait slow and steady, patient leans heavily on the walker, instructed to stand up and not lean, eating breakfast at this time, no other needs spoken

## 2020-12-24 NOTE — PROGRESS NOTES
[facility] Progress Note  Name: Barbara Sender  Age: 50 y.o. Gender: female  CodeStatus: Full Code  Allergies: No Known Allergies    Chief Complaint:Headache    Primary Care Provider: Caro Rizvi PA-C  InpatientTreatment Team: Treatment Team: Attending Provider: Topher Burks MD; Consulting Physician: Tarik Pina MD; Consulting Physician: Bhanu Leigh MD; Utilization Reviewer: Sharee Gonzales RN; Registered Nurse: Kwaku Garcia RN; Patient Care Tech: Kimber Proctor; Patient Care Tech: Clotilde Xavier  Admission Date: 12/21/2020      Subjective: Seen and examined patient states that she is feeling much better. She has been working with PT to increase ambulation. States that she walked to the chair sat up in the chair walk back to bed but then she was fatigued not ready to go home yet due to continued weakness. States she would like 1 more day of physical therapy. Continues to have some blurriness in the right eye. Continues to have bilateral nystagmus. but overall states that she is feeling much better. Was found to be hypokalemic this morning and is getting replacement. Physical Exam  HENT:      Head: Normocephalic and atraumatic. Right Ear: External ear normal.      Left Ear: External ear normal.      Nose: Nose normal.      Mouth/Throat:      Mouth: Mucous membranes are dry. Pharynx: Oropharynx is clear. Eyes:      Extraocular Movements:      Right eye: Nystagmus present. Left eye: Nystagmus present. Pupils: Pupils are equal, round, and reactive to light. Neck:      Musculoskeletal: Normal range of motion and neck supple. Cardiovascular:      Rate and Rhythm: Regular rhythm. Bradycardia present. Pulses: Normal pulses. Heart sounds: Normal heart sounds. Pulmonary:      Effort: Pulmonary effort is normal.      Breath sounds: Normal breath sounds. Abdominal:      General: Bowel sounds are normal.      Palpations: Abdomen is soft. Musculoskeletal: Normal range of motion. Skin:     General: Skin is dry. Capillary Refill: Capillary refill takes 2 to 3 seconds. Findings: Rash present. Neurological:      Mental Status: She is alert and oriented to person, place, and time. Psychiatric:         Mood and Affect: Mood normal.         Review of Systems   HENT:        Blurry vision R eye   Neurological: Positive for weakness. All other systems reviewed and are negative. Vitals:    12/24/20 0858   BP: (!) 106/58   Pulse: 57   Resp: 18   Temp:    SpO2:        Past Medical History:   Diagnosis Date    Asthma     Depression     Endometriosis 03/2017    Hypothyroidism     Postmenopausal 2008    Type II diabetes mellitus, uncontrolled ([facility] [address]      Past Surgical History:   Procedure Laterality Date    DENTAL SURGERY      ENDOMETRIAL BIOPSY  03/2017    Alice lala    INNER EAR SURGERY Right 1993    ear perforation    ROTATOR CUFF REPAIR Left 12/12/2014    TONSILLECTOMY AND ADENOIDECTOMY      TYMPANOSTOMY TUBE PLACEMENT           Medications:  Reviewed  Prior to Admission medications    Medication Sig Start Date End Date Taking?  Authorizing Provider   levothyroxine (LEVOTHROID) 150 MCG tablet Take 1 tablet by mouth daily 8/6/20  Yes Anika Stroud PA-C   loratadine (CLARITIN) 10 MG tablet Take 1 tablet by mouth daily 4/15/20  Yes Anika Stroud PA-C   fluticasone (FLONASE) 50 MCG/ACT nasal spray 1 spray by Each Nostril route daily 8/6/20   Anika Stroud PA-C   ibuprofen (ADVIL;MOTRIN) 800 MG tablet TAKE 1 TABLET BY MOUTH EVERY 6 HOURS AS NEEDED FOR PAIN 4/16/20   Anika Stroud PA-C   simvastatin (ZOCOR) 80 MG tablet TAKE ONE TABLET BY MOUTH ONCE DAILY IN THE EVENING 4/15/20   Anika Stroud PA-C   metFORMIN (GLUCOPHAGE) 1000 MG tablet TAKE ONE TABLET BY MOUTH ONCE DAILY WITH  BREAKFAST 4/15/20   Anika Stroud PA-C   glucose monitoring kit (FREESTYLE) monitoring kit 1 kit by Does not apply route daily 4/15/20   Anika Stroud PA-C   blood glucose test strips (ASCENSIA AUTODISC VI;ONE TOUCH ULTRA TEST VI) strip Test BID. DX E11.8 NIDDM 4/15/20   Anika Stroud PA-C   EQ ALLERGY RELIEF 10 MG tablet TAKE 1 TABLET BY MOUTH ONCE DAILY 2/17/20   Anika Stroud PA-C   fluticasone (FLONASE) 50 MCG/ACT nasal spray USE 1 SPRAY(S) IN EACH NOSTRIL ONCE DAILY 2/17/20   Anika Stroud PA-C   Lancets MISC Test BID. DX E11.8 NIDDM 1/22/19   Anika Stroud PA-C   desoximetasone (TOPICORT) 0.05 % OINT oitment Apply to affected area twice daily as needed 1/22/19   Anika Stroud PA-C   Skin Protectants, Misc.  (HYDROCERIN) CREA cream Apply topically 2 times daily 1/22/19   Anika Stroud PA-C   glucose monitoring kit (FREESTYLE) monitoring kit 1 kit by Does not apply route daily DX E11.8 NIDDM 3/13/18   Anika Stroud PA-C   nabumetone (RELAFEN) 500 MG tablet Take 1 tablet by mouth 2 times daily 3/8/18   Anika Stroud PA-C       Current Facility-Administered Medications   Medication Dose Route Frequency Provider Last Rate Last Admin    potassium chloride (KLOR-CON M) extended release tablet 40 mEq  40 mEq Oral Q4H Nabeel Sauceda DO   40 mEq at 12/24/20 0900    0.9 % sodium chloride infusion   Intravenous Continuous Jeanna Oppenheim,  mL/hr at 12/24/20 0545 New Bag at 12/24/20 0545    hydrOXYzine (VISTARIL) capsule 25 mg  25 mg Oral TID Kevin Riley MD   25 mg at 12/24/20 0859    meclizine (ANTIVERT) tablet 25 mg  25 mg Oral TID Kevin Riley MD   25 mg at 12/24/20 0859    fluticasone (FLONASE) 50 MCG/ACT nasal spray 1 spray  1 spray Nasal Daily Dorothy Crosser, DO        cetirizine (ZYRTEC) tablet 10 mg  10 mg Oral Daily Dorothy Hernandez DO   10 mg at 12/24/20 0859    [Held by provider] atorvastatin (LIPITOR) tablet 40 mg  40 mg Oral Daily Dorothy Hernandez, DO   40 mg at 12/22/20 1220    levothyroxine (SYNTHROID) tablet 300 mcg  300 mcg Oral Daily Judith Godinez MD   300 mcg at 12/24/20 0546    prochlorperazine (COMPAZINE) injection 10 mg  10 mg Intravenous Q6H PRN Mir Comber, DO   10 mg at 12/21/20 1119    sodium chloride flush 0.9 % injection 10 mL  10 mL Intravenous 2 times per day Mir Comber, DO   10 mL at 12/22/20 2253    sodium chloride flush 0.9 % injection 10 mL  10 mL Intravenous PRN Mir Comber, DO        enoxaparin (LOVENOX) injection 40 mg  40 mg Subcutaneous Daily Mir Comber, DO   40 mg at 12/24/20 0900    promethazine (PHENERGAN) tablet 12.5 mg  12.5 mg Oral Q6H PRN Mir Comber, DO        Or    ondansetron [facility]) injection 4 mg  4 mg Intravenous Q6H PRN Mir Comber, DO        polyethylene glycol (GLYCOLAX) packet 17 g  17 g Oral Daily PRN Mir Comber, DO        acetaminophen (TYLENOL) tablet 650 mg  650 mg Oral Q6H PRN Mir Comber, DO   650 mg at 12/24/20 0545    Or    acetaminophen (TYLENOL) suppository 650 mg  650 mg Rectal Q6H PRN Mir Comber, DO        glucose (GLUTOSE) 40 % oral gel 15 g  15 g Oral PRN Mir Comber, DO        dextrose 50 % IV solution  12.5 g Intravenous PRN Mir Comber, DO        glucagon (rDNA) injection 1 mg  1 mg Intramuscular PRN Mir Comber, DO        dextrose 5 % solution  100 mL/hr Intravenous PRN Mir Comber, DO        insulin lispro (HUMALOG) injection vial 0-6 Units  0-6 Units Subcutaneous TID WC Mir Comber, DO   1 Units at 12/22/20 1223    insulin lispro (HUMALOG) injection vial 0-3 Units  0-3 Units Subcutaneous Nightly Mir Comber, DO   1 Units at 12/23/20 2051        Infusion Medications:    sodium chloride 100 mL/hr at 12/24/20 0545    dextrose       Scheduled Medications:    potassium chloride  40 mEq Oral Q4H    hydrOXYzine  25 mg Oral TID    meclizine  25 mg Oral TID    fluticasone  1 spray Nasal Daily    cetirizine  10 mg Oral Daily    [Held by provider] atorvastatin  40 mg Oral Daily    levothyroxine  300 mcg Oral Daily    sodium chloride flush  10 mL Intravenous 2 times per day    enoxaparin  40 mg Subcutaneous Daily    insulin lispro  0-6 Units Subcutaneous TID WC    insulin lispro  0-3 Units Subcutaneous Nightly     PRN Meds: prochlorperazine, sodium chloride flush, promethazine **OR** ondansetron, polyethylene glycol, acetaminophen **OR** acetaminophen, glucose, dextrose, glucagon (rDNA), dextrose    Labs:   Recent Labs     12/22/20  0505 12/23/20  0452 12/24/20  0515   WBC 6.8 7.7 7.3   HGB 12.3 12.4 12.0   HCT 36.9* 36.4* 35.7*    306 274     Recent Labs     12/22/20  0505 12/23/20  0452 12/24/20  0515   * 130* 134*   K 3.3* 3.4 2.8*   CL 95 92* 97   CO2 25 29 28   BUN 12 15 12   CREATININE 0.98* 1.19* 0.94*   CALCIUM 8.4* 9.0 8.4*     Recent Labs     12/21/20  1330   AST 30   ALT 16   BILITOT 0.4   ALKPHOS 61     No results for input(s): INR in the last 72 hours. Recent Labs     12/22/20  1116 12/23/20  0452 12/24/20  0515   CKTOTAL 638* 563* 432*       Urinalysis:   No results found for:  Ink, BACTERIA, RBCUA, BLOODU, SPECGRAV, [address]    Radiology:   Most recent    Chest CT      WITH CONTRAST:No results found for this or any previous visit. WITHOUT CONTRAST: No results found for this or any previous visit. CXR      2-view: No results found for this or any previous visit. Portable: No results found for this or any previous visit. Echo No results found for this or any previous visit. Assessment/Plan:    1) migraine HA with visual changes- neurology following.  Imitrex  2) Hypokalemia- replaced, repeat BMP in am  2) DM2- Continue SSI; hypoglycemia protocol  3) Uncontrolled Hypothyroidism- continue synthroid  4) Weakness- Continue PT/OT  5) BPPV- antivert   6) Discharge planning- pending neuro recommendations;plan for tomorrow  7) DVT proph- lovenox  8) CK downtrending            Active Hospital Problems    Diagnosis Date Noted    Complicated migraine [Z56.911]     Dizziness [R42]     Benign paroxysmal positional vertigo due to bilateral vestibular disorder [H81.13]     Ataxic gait [R26.0]     Hypothyroidism [E03.9] 12/22/2020    Migraine variant [G43.809] 12/21/2020       Additional work up or/and treatment plan may be added today or then after based on clinical progression. I am managing a portion of pt care. Some medical issues are handled byother specialists. Additional work up and treatment should be done in out pt setting by pt PCP and other out pt providers. In addition to examining and evaluating pt, I spent additional time explaining care, normaland abnormal findings, and treatment plan. All of pt questions were answered. Counseling, diet and education were provided. Case will be discussed with nursing staff when appropriate. Family will be updated if and whenappropriate.       Electronically signed by YANELY Velazquez NP on 12/24/2020 at 10:15 AM

## 2020-12-24 NOTE — CARE COORDINATION
LSW spoke with the pt regarding her DC plan for tomorrow. SNF was offered as pt is not feeling ready to go home today. Pt declined SNF and said that she will feel ready for DC to home tomorrow. Pt wants a walker and bedside commode for home as well as [facility] to follow. LSW RECEIVED A CALL FROM PT'S SPOUSE. HE WAS ABLE TO GET NEEDED EQUIPMENT IN THE HOUSE FOR PT'S DC TOMORROW. THEY HAVE A BEDSIDE COMMODE FOR A DOWNSTAIRS SET UP.

## 2020-12-25 LAB
ALBUMIN SERPL-MCNC: 3.7 G/DL (ref 3.5–4.6)
ANION GAP SERPL CALCULATED.3IONS-SCNC: 10 MEQ/L (ref 9–15)
BASOPHILS ABSOLUTE: 0.1 K/UL (ref 0–0.2)
BASOPHILS RELATIVE PERCENT: 0.8 %
BUN BLDV-MCNC: 13 MG/DL (ref 6–20)
CALCIUM SERPL-MCNC: 8.7 MG/DL (ref 8.5–9.9)
CHLORIDE BLD-SCNC: 102 MEQ/L (ref 95–107)
CK MB: 5.3 NG/ML (ref 0–3.8)
CO2: 27 MEQ/L (ref 20–31)
CREAT SERPL-MCNC: 1.03 MG/DL (ref 0.5–0.9)
CREATINE KINASE-MB INDEX: 1.1 % (ref 0–3.5)
EOSINOPHILS ABSOLUTE: 0.5 K/UL (ref 0–0.7)
EOSINOPHILS RELATIVE PERCENT: 7.8 %
GFR AFRICAN AMERICAN: >60
GFR NON-AFRICAN AMERICAN: 57.4
GLUCOSE BLD-MCNC: 107 MG/DL (ref 60–115)
GLUCOSE BLD-MCNC: 123 MG/DL (ref 60–115)
GLUCOSE BLD-MCNC: 125 MG/DL (ref 70–99)
GLUCOSE BLD-MCNC: 132 MG/DL (ref 60–115)
HCT VFR BLD CALC: 33 % (ref 37–47)
HEMOGLOBIN: 11.4 G/DL (ref 12–16)
LYMPHOCYTES ABSOLUTE: 1.9 K/UL (ref 1–4.8)
LYMPHOCYTES RELATIVE PERCENT: 28.6 %
MAGNESIUM: 2.2 MG/DL (ref 1.7–2.4)
MCH RBC QN AUTO: 32.5 PG (ref 27–31.3)
MCHC RBC AUTO-ENTMCNC: 34.5 % (ref 33–37)
MCV RBC AUTO: 94.1 FL (ref 82–100)
MONOCYTES ABSOLUTE: 0.4 K/UL (ref 0.2–0.8)
MONOCYTES RELATIVE PERCENT: 6.3 %
NEUTROPHILS ABSOLUTE: 3.7 K/UL (ref 1.4–6.5)
NEUTROPHILS RELATIVE PERCENT: 56.5 %
PDW BLD-RTO: 15.2 % (ref 11.5–14.5)
PERFORMED ON: ABNORMAL
PERFORMED ON: ABNORMAL
PERFORMED ON: NORMAL
PHOSPHORUS: 2.7 MG/DL (ref 2.3–4.8)
PLATELET # BLD: 252 K/UL (ref 130–400)
POTASSIUM SERPL-SCNC: 3.7 MEQ/L (ref 3.4–4.9)
RBC # BLD: 3.5 M/UL (ref 4.2–5.4)
SODIUM BLD-SCNC: 139 MEQ/L (ref 135–144)
TOTAL CK: 480 U/L (ref 0–170)
WBC # BLD: 6.5 K/UL (ref 4.8–10.8)

## 2020-12-25 PROCEDURE — 82553 CREATINE MB FRACTION: CPT

## 2020-12-25 PROCEDURE — 82550 ASSAY OF CK (CPK): CPT

## 2020-12-25 PROCEDURE — 2580000003 HC RX 258: Performed by: INTERNAL MEDICINE

## 2020-12-25 PROCEDURE — 6370000000 HC RX 637 (ALT 250 FOR IP): Performed by: PSYCHIATRY & NEUROLOGY

## 2020-12-25 PROCEDURE — 99233 SBSQ HOSP IP/OBS HIGH 50: CPT | Performed by: PSYCHIATRY & NEUROLOGY

## 2020-12-25 PROCEDURE — 6370000000 HC RX 637 (ALT 250 FOR IP): Performed by: INTERNAL MEDICINE

## 2020-12-25 PROCEDURE — 36415 COLL VENOUS BLD VENIPUNCTURE: CPT

## 2020-12-25 PROCEDURE — 83735 ASSAY OF MAGNESIUM: CPT

## 2020-12-25 PROCEDURE — 1210000000 HC MED SURG R&B

## 2020-12-25 PROCEDURE — 85025 COMPLETE CBC W/AUTO DIFF WBC: CPT

## 2020-12-25 PROCEDURE — 80069 RENAL FUNCTION PANEL: CPT

## 2020-12-25 PROCEDURE — 6360000002 HC RX W HCPCS: Performed by: INTERNAL MEDICINE

## 2020-12-25 PROCEDURE — 93010 ELECTROCARDIOGRAM REPORT: CPT | Performed by: INTERNAL MEDICINE

## 2020-12-25 RX ORDER — MECLIZINE HYDROCHLORIDE 25 MG/1
25 TABLET ORAL 3 TIMES DAILY
Qty: 30 TABLET | Refills: 0 | Status: SHIPPED | OUTPATIENT
Start: 2020-12-25 | End: 2021-01-13 | Stop reason: SDUPTHER

## 2020-12-25 RX ORDER — SUMATRIPTAN 6 MG/.5ML
6 INJECTION, SOLUTION SUBCUTANEOUS ONCE
Status: COMPLETED | OUTPATIENT
Start: 2020-12-25 | End: 2020-12-25

## 2020-12-25 RX ADMIN — ACETAMINOPHEN 650 MG: 325 TABLET ORAL at 02:34

## 2020-12-25 RX ADMIN — SODIUM CHLORIDE: 9 INJECTION, SOLUTION INTRAVENOUS at 02:34

## 2020-12-25 RX ADMIN — CETIRIZINE HYDROCHLORIDE 10 MG: 10 TABLET, FILM COATED ORAL at 08:24

## 2020-12-25 RX ADMIN — MECLIZINE HYDROCHLORIDE 25 MG: 25 TABLET ORAL at 08:24

## 2020-12-25 RX ADMIN — MECLIZINE HYDROCHLORIDE 25 MG: 25 TABLET ORAL at 13:51

## 2020-12-25 RX ADMIN — HYDROXYZINE PAMOATE 25 MG: 25 CAPSULE ORAL at 08:24

## 2020-12-25 RX ADMIN — MECLIZINE HYDROCHLORIDE 25 MG: 25 TABLET ORAL at 21:34

## 2020-12-25 RX ADMIN — Medication 10 ML: at 21:34

## 2020-12-25 RX ADMIN — HYDROXYZINE PAMOATE 25 MG: 25 CAPSULE ORAL at 21:34

## 2020-12-25 RX ADMIN — LEVOTHYROXINE SODIUM 300 MCG: 0.1 TABLET ORAL at 05:19

## 2020-12-25 RX ADMIN — HYDROXYZINE PAMOATE 25 MG: 25 CAPSULE ORAL at 13:51

## 2020-12-25 RX ADMIN — FLUTICASONE PROPIONATE 1 SPRAY: 50 SPRAY, METERED NASAL at 08:24

## 2020-12-25 RX ADMIN — ENOXAPARIN SODIUM 40 MG: 100 INJECTION SUBCUTANEOUS at 08:24

## 2020-12-25 RX ADMIN — SUMATRIPTAN SUCCINATE 6 MG: 6 INJECTION SUBCUTANEOUS at 14:46

## 2020-12-25 ASSESSMENT — ENCOUNTER SYMPTOMS
SHORTNESS OF BREATH: 0
COUGH: 0
CHOKING: 0
CONSTIPATION: 0
PHOTOPHOBIA: 0
VOMITING: 0
TROUBLE SWALLOWING: 0
COLOR CHANGE: 0
BACK PAIN: 0
DIARRHEA: 0
NAUSEA: 1

## 2020-12-25 ASSESSMENT — PAIN SCALES - GENERAL: PAINLEVEL_OUTOF10: 3

## 2020-12-25 NOTE — PROGRESS NOTES
Shift assessment completed. Pt is alert and oriented x4, calm, cooperative. PERRLA but had sluggish reaction to light, neuro assessment unremarkable. Pt does have a complaint of right eye vision impairment, she said its like having double vision in that eye. Heart and lung sounds WDL. Bowel sounds active x4 quadrants. Pt would like some information as to if she would qualify for social security disability, I will let the  know. Pt has no requests at this time, resting comfortably in bed, call light within reach. 1000- Pt is asking about when she will be discharged, she said someone told her yesterday that she would be able to go home today. Dr. Mackenzie Cruz notified via perfect serve. 1230- This patient is refusing her telemetry monitor and her IV fluids. She stated that she is discharged but is unable to make it home until tomorrow.

## 2020-12-25 NOTE — PROGRESS NOTES
Hospitalist Progress Note      PCP: Pavan Flores PA-C    Date of Admission: 12/21/2020    Chief Complaint:  No acute events, afebrile, stable HD, wants to go home    Medications:  Reviewed    Infusion Medications    sodium chloride 100 mL/hr at 12/25/20 0234    dextrose       Scheduled Medications    hydrOXYzine  25 mg Oral TID    meclizine  25 mg Oral TID    fluticasone  1 spray Nasal Daily    cetirizine  10 mg Oral Daily    [Held by provider] atorvastatin  40 mg Oral Daily    levothyroxine  300 mcg Oral Daily    sodium chloride flush  10 mL Intravenous 2 times per day    enoxaparin  40 mg Subcutaneous Daily    insulin lispro  0-6 Units Subcutaneous TID WC    insulin lispro  0-3 Units Subcutaneous Nightly     PRN Meds: prochlorperazine, sodium chloride flush, promethazine **OR** ondansetron, polyethylene glycol, acetaminophen **OR** acetaminophen, glucose, dextrose, glucagon (rDNA), dextrose      Intake/Output Summary (Last 24 hours) at 12/25/2020 1115  Last data filed at 12/25/2020 0824  Gross per 24 hour   Intake 130 ml   Output --   Net 130 ml       Exam:    BP (!) 140/83   Pulse 62   Temp 97.5 °F (36.4 °C)   Resp 19   Ht 4' 9\" (1.448 m)   Wt 189 lb 9.6 oz (86 kg)   SpO2 97%   BMI 41.03 kg/m²     General appearance: appears stated age and cooperative. Respiratory: clear to auscultation bilaterally. Cardiovascular: Regular rate and rhythm with normal S1/S2. Abdomen: Soft, active bowel sounds. Musculoskeletal: No edema bilaterally.      Labs:   Recent Labs     12/23/20  0452 12/24/20  0515 12/25/20  0511   WBC 7.7 7.3 6.5   HGB 12.4 12.0 11.4*   HCT 36.4* 35.7* 33.0*    274 252     Recent Labs     12/23/20  0452 12/24/20  0515 12/25/20  0511   * 134* 139   K 3.4 2.8* 3.7   CL 92* 97 102   CO2 29 28 27   BUN 15 12 13   CREATININE 1.19* 0.94* 1.03*   CALCIUM 9.0 8.4* 8.7   PHOS  --   --  2.7     No results for input(s): AST, ALT, BILIDIR, BILITOT, ALKPHOS in the last 72 hours. No results for input(s): INR in the last 72 hours. Recent Labs     12/23/20  0452 12/24/20  0515 12/25/20  0511   CKTOTAL 563* 432* 480*       Urinalysis:    No results found for: Lazaro Bourdon, BACTERIA, RBCUA, BLOODU, SPECGRAV, GLUCOSEU    Radiology:  CTA HEAD W WO CONTRAST   Final Result   The major intracranial arterial structures are patent without high-grade stenosis, large vessel cut off, or aneurysm. Specifically the basilar artery is normal course and caliber without aneurysm or stenosis         EXAMINATION: CTA HEAD W WO CONTRAST, CTA NECK W WO CONTRAST      DATE AND TIME:12/22/2020 10:04 PM      CLINICAL HISTORY: Stroke symptoms   basilar stenosis        COMPARISON: None      TECHNIQUE: Helical CTA of the neck was performed from the aortic arch to the foramen magnum following the uneventful intravenous administration of 100 cc of nonionic contrast without incident. 2-D images were reconstructed in the sagittal and coronal    planes. Three Dimensional Maximum Intensity Projection (3D-MIP) images were created. All images were reviewed and primarily archived to PACS workstation. All CT scans at this facility use dose modulation, iterative reconstruction, and/or weight based    dosing when appropriate to reduce radiation dose to as low as reasonably achievable. NASCET Criteria were utilized      FINDINGS CTA NECK:      Aortic Arch: The visualized portions of the aortic arch and proximal arch vessels demonstrates no focal stenosis aneurysm or dissection. Carotids: The common carotid arteries, carotid bifurcations, internal and external carotid arteries are normal in course and caliber. Right  Proximal Internal Carotid Stenosis (% by NASCET Criteria): There is no hemodynamically significant stenosis. Left  Proximal Internal Carotid Stenosis (% by NASCET Criteria): There is no hemodynamically significant stenosis.               Vertebral Arteries:      Patency: The vertebral basilar artery. There is no focal stenosis aneurysm or dissection. Vertebral arteries are codominant. IMPRESSION: Negative CTA of the neck. MRI BRAIN WO CONTRAST   Final Result      No acute intracranial process. CT Head WO Contrast   Final Result      No acute intracranial process. Paranasal sinus disease.                  Assessment/Plan:    57-year-old female with history of obesity, DM2, hypothyroidism who presented with severe frontal headache with associated right side vision blurriness and right-sided tingling.     Complicated migraine  - CTA and MRI of the head and neck were negative for acute findings  - clinically improved  - managed by neurology    Uncontrolled hypothyroidism  - due to noncompliance  - resumed Synthroid  - followed by endocrinology    Hypokalemia   - replaced     DM2  - controlled    Hyperlipidemia   - uncontrolled, continue home meds       Disposition - home with [facility] today                 Electronically signed by Anne Marie Ware MD on 12/25/2020 at 11:15 AM

## 2020-12-25 NOTE — CARE COORDINATION
SPOKE WITH PT AND SHE STATES [facility] HAS ALREADY CONTACTED HER FOR APPT SET UP. REQUESTING WALKER FOR D/C TOMORROW, HAS BSC, MESSAGE TO DR. Franck Estrada.   Electronically signed by Edwina Sorensen RN on 12/25/2020 at 4:05 PM

## 2020-12-25 NOTE — PROGRESS NOTES
Physician Progress Note      Jovanna Malik  CSN #:                  [id number]  :                       1975  ADMIT DATE:       2020 10:34 AM  [address] [place] DATE:  RESPONDING  PROVIDER #:        Antonina Najera MD          QUERY TEXT:    Dear attending. Pt admitted with complicated migraine, noted to have uncontrolled   hypothyroidism.  Dr. Cristian Giordano noted h/a and vision changes suspected 2/2   severe hypothyroidism. If possible, please document in progress notes and   discharge summary the relationship, if any, between complicated migraine and   hypothyroidism: The medical record reflects the following:  Risk Factors: severe hypothyroidism, noncompliance  Clinical Indicators:  Dr. Cristian Giordano-. Headache and vision change suspect   secondary to severe hypothyroidism from noncompliance. Patient admits she has   not taken her thyroid medication, or any medication, for the last 3 weeks   Dr. Harrison-Hypothyroidism may be the cause or a contributing factor to   her vertigo, although improvement with antihistamines and imitrex could point   toward BPPV or atypical migraine. CTA and MRI of the head and neck were negative for acute findings. .400   T4 <0.03  Treatment: resumed Synthroid,  Imitrex, Vistaril, CT/MRI head,   neurology/endocrinology consult, labs    Thank you, Justin Arora RN BSN [facility]  [phone]  Options provided:  -- Complicated migraine due to severe hypothyroidism  -- Complicated migraine unrelated to severe hypothyroidism  -- Other - I will add my own diagnosis  -- Disagree - Not applicable / Not valid  -- Disagree - Clinically unable to determine / Unknown  -- Refer to Clinical Documentation Reviewer    PROVIDER RESPONSE TEXT:    This patient has complicated migraine due to severe hypothyroidism.     Query created by: Wendall Koyanagi on 2020 3:36 PM      Electronically signed by:  Antonina Najera MD 2020 1:19 PM

## 2020-12-25 NOTE — DISCHARGE SUMMARY
Hospital Medicine Discharge Summary    Levon Francis  :  1975  MRN:  [id number]    Admit date:  2020  Discharge date:  2020    Admitting Physician:  Nuha Galan DO  Primary Care Physician:  Zahira Purvis PA-C      Discharge Diagnoses: Active Problems:    Migraine variant    Hypothyroidism    Complicated migraine    Dizziness    Benign paroxysmal positional vertigo due to bilateral vestibular disorder    Ataxic gait  Resolved Problems:    * No resolved hospital problems. *      Hospital Course:   Levon Francis is a 50 y.o. female that was admitted and treated at [facility] for the following medical issues:     Complicated migraine  - CTA and MRI of the head and neck were negative for acute findings  - clinically improved  - managed by neurology    Uncontrolled hypothyroidism  - due to noncompliance  - resumed Synthroid  - followed by endocrinology    Hypokalemia   - replaced     DM2  - controlled    Hyperlipidemia   - uncontrolled, continued home meds       Disposition - home with [place] [address]         Patient was seen by the following consultants while admitted to [facility]:   Consults:  Jen Moya 93    Significant Diagnostic Studies:    Cta Head W Wo Contrast    Result Date: 2020  EXAMINATION: CTA HEAD W WO CONTRAST, CTA [address] TIME:2020 10:04 PM CLINICAL HISTORY: Stroke  basilar stenosis  COMPARISON: None TECHNIQUE:Helical CTA of the head was performed from the vertex to the foramen magnum following the uneventful intravenous administration of 100 cc of nonionic contrast without incident. 2-D images were reconstructed in the sagittal and coronal planes. Three Dimensional Maximum Intensity Projection (3D-MIP) images were created. All images were reviewed and primarily archived to PACS workstation.  All CT scans at this facility use dose when appropriate to reduce radiation dose to as low as reasonably achievable. NASCET Criteria were utilized FINDINGS CTA NECK: Aortic Arch: The visualized portions of the aortic arch and proximal arch vessels demonstrates no focal stenosis aneurysm or dissection. Carotids: The common carotid arteries, carotid bifurcations, internal and external carotid arteries are normal in course and caliber. Right  Proximal Internal Carotid Stenosis (% by NASCET Criteria): There is no hemodynamically significant stenosis. Left  Proximal Internal Carotid Stenosis (% by NASCET Criteria): There is no hemodynamically significant stenosis. Vertebral Arteries: Patency: The vertebral arteries are well visualized to the level of the basilar artery. There is no focal stenosis aneurysm or dissection. Vertebral arteries are codominant. IMPRESSION: Negative CTA of the neck. Ct Head Wo Contrast    Result Date: 12/21/2020  EXAMINATION:  CT HEAD WO CONTRAST HISTORY:   frontal headache and vertigo    Vomiting TECHNIQUE:  Serial axial images without IV contrast were obtained from the vertex to the foramen magnum. Sagittal and coronal reconstructions. All CT scans at this facility use dose modulation, iterative reconstruction, and/or weight based dosing when appropriate to reduce radiation dose to as low as reasonably achievable. COMPARISON:  CT head 9/27/2013 RESULT: Acute change:   No evidence of an acute infarct or other acute parenchymal process. Hemorrhage:    No evidence of acute intracranial hemorrhage. Mass Lesion / Mass Effect:   There is no evidence of an intracranial mass or extraaxial fluid collection. No significant mass effect. Chronic change:   None apparent. Parenchyma: There is no significant volume loss for age. The brain parenchyma is otherwise within normal limits for age. Ventricles: The ventricles are within normal limits of size and configuration for age.  Paranasal sinuses and skull base:  Mild paranasal sinus disease in the visualized sinuses with areas of polypoid thickening. Mastoid air cells are clear. The skull base is unremarkable. Soft tissues unremarkable. No acute intracranial process. Paranasal sinus disease. Cta Neck W Wo Contrast    Result Date: 12/23/2020  EXAMINATION: CTA HEAD W WO CONTRAST, CTA NECK W WO CONTRAST DATE AND TIME:12/22/2020 10:04 PM CLINICAL HISTORY: Stroke  basilar stenosis  COMPARISON: None TECHNIQUE:Helical CTA of the head was performed from the vertex to the foramen magnum following the uneventful intravenous administration of 100 cc of nonionic contrast without incident. 2-D images were reconstructed in the sagittal and coronal planes. Three Dimensional Maximum Intensity Projection (3D-MIP) images were created. All images were reviewed and primarily archived to PACS workstation. All CT scans at this facility use dose modulation, iterative reconstruction, and/or weight based dosing when appropriate to reduce radiation dose to as low as reasonably achievable. FINDINGS CTA HEAD: Intracranial ICAs: Flow is visualized within the precavernous, cavernous, clinoid and supraclinoid segments of the internal carotid arteries bilaterally    Anterior Cerebral Arteries: The bilaterals  A1 and A2 segments are patent. Middle Cerebral Arteries: Bilateral horizontal, insular, opercular, and cortical segments of the right and left middle cerebral cerebral arteries are patent. Vertebral Arteries And Basilar Artery: There is adequate flow in the intracranial portions of the vertebral arteries and in the basilar artery. Posterior Cerebral Arteries: Bilateral posterior cerebral arteries are patent. There is a persistent fetal origin of the right posterior cerebral artery. Aneurysm No aneurysm or dissection in the anterior or posterior circulations. . Neurocranium The visualized neurocranium is intact. Dural Sinus: As visualized the opacified dural venous sinuses are unremarkable. The major intracranial arterial structures are patent without high-grade stenosis, large vessel cut off, or aneurysm. Specifically the basilar artery is normal course and caliber without aneurysm or stenosis EXAMINATION: CTA HEAD W WO CONTRAST, CTA NECK W WO CONTRAST DATE AND TIME:12/22/2020 10:04 PM CLINICAL HISTORY: Stroke symptoms   basilar stenosis  COMPARISON: None TECHNIQUE: Helical CTA of the neck was performed from the aortic arch to the foramen magnum following the uneventful intravenous administration of 100 cc of nonionic contrast without incident. 2-D images were reconstructed in the sagittal and coronal planes. Three Dimensional Maximum Intensity Projection (3D-MIP) images were created. All images were reviewed and primarily archived to PACS workstation. All CT scans at this facility use dose modulation, iterative reconstruction, and/or weight based dosing when appropriate to reduce radiation dose to as low as reasonably achievable. NASCET Criteria were utilized FINDINGS CTA NECK: Aortic Arch: The visualized portions of the aortic arch and proximal arch vessels demonstrates no focal stenosis aneurysm or dissection. Carotids: The common carotid arteries, carotid bifurcations, internal and external carotid arteries are normal in course and caliber. Right  Proximal Internal Carotid Stenosis (% by NASCET Criteria): There is no hemodynamically significant stenosis. Left  Proximal Internal Carotid Stenosis (% by NASCET Criteria): There is no hemodynamically significant stenosis. Vertebral Arteries: Patency: The vertebral arteries are well visualized to the level of the basilar artery. There is no focal stenosis aneurysm or dissection. Vertebral arteries are codominant. IMPRESSION: Negative CTA of the neck. Mri Brain Wo Contrast    Result Date: 12/22/2020  EXAMINATION:  MRI BRAIN WO CONTRAST HISTORY:   stroke .  G43.809 Migraine variant ICD10 TECHNIQUE:  Routine noncontrast MRI protocol including diffusion and gradient echo images. COMPARISON: CT head 12/21/2020 RESULT: Acute Change: There is no evidence of restricted diffusion to suggest an acute infarct. Hemorrhage:    No evidence of prior parenchymal hemorrhage. Mass Lesion/ Mass Effect:    No evidence of an intracranial mass or extra-axial fluid collection. No significant mass effect. Chronic Change:  Scattered punctate foci of increased T2 and FLAIR signal are noted in the supratentorial white matter which is a nonspecific finding, but likely represents minimal chronic microvascular ischemia. Parenchyma:   No significant volume loss for age. The brain parenchyma is otherwise within normal limits of signal intensity and morphology. Ventricles:     Normal caliber and morphology. Skull Base:    Hypothalamic and pituitary region are grossly normal.   Craniocervical junction is normal. No significant marrow replacement process. Vasculature:    Major intracranial arterial structures, and dural venous sinuses show typical flow void, suggesting patency by spin echo criteria. Other:  Polyps or mucous retention cysts in the maxillary sinuses. Mild mucosal thickening within the ethmoid air cells. Trace fluid within the mastoid air cells, nonspecific. The orbits are unremarkable. Soft tissues are unremarkable. No acute intracranial process. Discharge Medications:       CLAUDIA Mendez 80 Medication Instructions SCR:[id number]    Printed on:12/25/20 1129   Medication Information                      blood glucose test strips (ASCENSIA AUTODISC VI;ONE TOUCH ULTRA TEST VI) strip  Test BID.   DX E11.8 NIDDM             desoximetasone (TOPICORT) 0.05 % OINT oitment  Apply to affected area twice daily as needed             EQ ALLERGY RELIEF 10 MG tablet  TAKE 1 TABLET BY MOUTH ONCE DAILY             fluticasone (FLONASE) 50 MCG/ACT nasal spray  USE 1 SPRAY(S) IN EACH NOSTRIL ONCE DAILY             fluticasone (FLONASE) 50 MCG/ACT nasal spray  1 spray by Each Nostril route daily             glucose monitoring kit (FREESTYLE) monitoring kit  1 kit by Does not apply route daily DX E11.8 NIDDM             glucose monitoring kit (FREESTYLE) monitoring kit  1 kit by Does not apply route daily             ibuprofen (ADVIL;MOTRIN) 800 MG tablet  TAKE 1 TABLET BY MOUTH EVERY 6 HOURS AS NEEDED FOR PAIN             Lancets MISC  Test BID. DX E11.8 NIDDM             levothyroxine (LEVOTHROID) 150 MCG tablet  Take 1 tablet by mouth daily             loratadine (CLARITIN) 10 MG tablet  Take 1 tablet by mouth daily             meclizine (ANTIVERT) 25 MG tablet  Take 1 tablet by mouth 3 times daily for 10 days             metFORMIN (GLUCOPHAGE) 1000 MG tablet  TAKE ONE TABLET BY MOUTH ONCE DAILY WITH  BREAKFAST             nabumetone (RELAFEN) 500 MG tablet  Take 1 tablet by mouth 2 times daily             simvastatin (ZOCOR) 80 MG tablet  TAKE ONE TABLET BY MOUTH ONCE DAILY IN THE EVENING             Skin Protectants, Misc. (HYDROCERIN) CREA cream  Apply topically 2 times daily                 Disposition:   Discharged to Home. Any [facility] needs that were indicated and/or required as been addressed and set up by Social Work. Condition at discharge: Pt was medically stable at the time of discharge. Significant improvement in clinical condition compared to initial condition at presentation to hospital    Activity: activity as tolerated, fall precautions. Total time taken for discharging this patient: 40 minutes. Greater than 70% of time was spent focused exclusively on this patient. Time was taken to review chart, discuss plans with consultants, reconciling medications, discussing plan answering questions with patient.      Leandro Alvarenga  12/25/2020, 11:26 AM  ----------------------------------------------------------------------------------------------------------------------    Diana Lock,     Please return to ER or call 911 if you develop any significant signs or symptoms.     I may not have addressed all of your medical illnesses or the abnormal blood work or imaging therefore please ask your PCP, Anika Stroud PA-C ,  to obtain [facility] record to follow up on all of the abnormal labs, imaging and findings that I have and have not addressed during your hospitalization.      Discharging you from the hospital does not mean that your medical care ends here and now. You may still need additional work up, investigation, monitoring, and treatment to be handled from this point on by outside providers including your PCP, Anika Stroud PA-C , Specialists and other healthcare providers.      Please review your list of discharge medications prior to resuming medications you might still have at home, as the medications you need to be taking, dosages or how often you must take them may have changed. For medication questions, contact your retail pharmacy and your PCP, Anika Stroud PA-C .     ** I STRONGLY RECOMMEND that you follow up with Anika Stroud PA-C within 3 to 5 days for a post hospitalization evaluation. This specific office visit is covered by your insurance, and is not the same as your annual doctor visit/ check up. This office visit is important, as it may prevent need for repeat and/or future hospitalizations. **    Your medical team at [facility] appreciates the opportunity to work with you to get well!     Sincerely,  Ani Chong

## 2020-12-25 NOTE — PROGRESS NOTES
[facility] Neurology Daily Progress Note  Name: Diana Lock  Age: 50 y.o. Gender: female  CodeStatus: Full Code  Allergies: No Known Allergies    Chief Complaint:Headache    Primary Care Provider: Lary Pack PA-C  InpatientTreatment Team: Treatment Team: Attending Provider: Michelle Ritter MD; Consulting Physician: Rudy Olsen MD; Consulting Physician: Shauna Kent MD; Registered Nurse: Mary Jo Droman RN; Patient Care Tech: Melissa Sharpe  Admission Date: 12/21/2020      Headache   Associated symptoms include dizziness, nausea and weakness. Pertinent negatives include no back pain, coughing, ear pain, fever, neck pain, numbness, photophobia, seizures, tinnitus or vomiting. HPI 50 right-handed female who presents with a headache she has bifrontal headache starting last night. She also had considerable vertigo with the same. Patient went to bed and awoke with a headache and felt drunk. Is under a large amount of stress lately financially. She does have history of vertigo and headaches in the past.  The headaches can last for a few hours or sometimes few days she has not been seen by neurology she is not on any preventive medications. She has been vomiting with a headache and hurts badly. Also reported some tingling of the right side of the face and upper extremity. She is not complaining of this at this time. She reports her walking is off since this has occurred. She is not able to walk but is good strength in the legs. She has photosensitivity. Denies any hearing loss or tinnitus or recent respiratory tract infections. She has previous history of otitis media with perforation. Reviewed results from CTA of head and neck are still pending. MRI of head is negative. Patient is experiencing increased vertigo today and has a headache. Patient is a poor historian.  States that she did not take the Ativert or hydroxyzine prescribed to her yesterday because the symptoms resolved on their own. However, the EMR and nursing shows she had 2 doses of hydroxyzine and meclizine as well as 1 dose of Imitrex yesterday. Is still having some dizziness with gait ataxia. Findings were discussed with the physical therapist and the nurse and she walks with an end-stage abasia gait. Patient requires a walker. CT angiograms were reviewed    She is feeling better though she still has some visual blurring in the right eye. Headache is mild. She still has gait issues which are likely to improve over time. She requires a walker and awaits a discharge tomorrow. Vitals:    12/25/20 0723   BP: (!) 140/83   Pulse: 62   Resp: 19   Temp: 97.5 °F (36.4 °C)   SpO2: 97%      Review of Systems   Constitutional: Negative for chills and fever. HENT: Negative for congestion, ear pain, tinnitus and trouble swallowing. Eyes: Negative for photophobia and visual disturbance. Respiratory: Negative for cough, choking and shortness of breath. Cardiovascular: Negative for chest pain and palpitations. Gastrointestinal: Positive for nausea. Negative for constipation, diarrhea and vomiting. Musculoskeletal: Positive for gait problem. Negative for back pain, joint swelling, myalgias, neck pain and neck stiffness. Skin: Negative for color change. Allergic/Immunologic: Negative for food allergies. Neurological: Positive for dizziness, weakness, light-headedness and headaches. Negative for tremors, seizures, syncope, facial asymmetry, speech difficulty and numbness. Psychiatric/Behavioral: Negative. Negative for behavioral problems, confusion, hallucinations and sleep disturbance. as above  Physical Exam  Vitals signs and nursing note reviewed. Constitutional:       Appearance: Normal appearance. HENT:      Head: Normocephalic and atraumatic. Eyes:      Conjunctiva/sclera: Conjunctivae normal.      Pupils: Pupils are equal, round, and reactive to light.       Comments: Has bilateral nystagmus Neck:      Musculoskeletal: Normal range of motion. Cardiovascular:      Rate and Rhythm: Normal rate and regular rhythm. Heart sounds: No murmur. Pulmonary:      Effort: Pulmonary effort is normal.      Breath sounds: Normal breath sounds. Abdominal:      General: Bowel sounds are normal.   Musculoskeletal: Normal range of motion. Skin:     General: Skin is warm and dry. Neurological:      General: No focal deficit present. Mental Status: She is alert and oriented to person, place, and time. Cranial Nerves: No cranial nerve deficit. Sensory: No sensory deficit. Motor: No abnormal muscle tone. Coordination: Coordination normal.      Deep Tendon Reflexes: Reflexes are normal and symmetric. Babinski sign absent on the right side. Babinski sign absent on the left side. Psychiatric:         Mood and Affect: Mood normal.       Neurologic Exam     Mental Status   Oriented to person, place, and time. Cranial Nerves     CN III, IV, VI   Pupils are equal, round, and reactive to light. In addition to the above patient has a astasia-abasia gait.   Patient's gait is improving as well  Medications:  Reviewed    Infusion Medications:    sodium chloride Stopped (12/25/20 1231)    dextrose       Scheduled Medications:    SUMAtriptan  6 mg Subcutaneous Once    hydrOXYzine  25 mg Oral TID    meclizine  25 mg Oral TID    fluticasone  1 spray Nasal Daily    cetirizine  10 mg Oral Daily    [Held by provider] atorvastatin  40 mg Oral Daily    levothyroxine  300 mcg Oral Daily    sodium chloride flush  10 mL Intravenous 2 times per day    enoxaparin  40 mg Subcutaneous Daily    insulin lispro  0-6 Units Subcutaneous TID WC    insulin lispro  0-3 Units Subcutaneous Nightly     PRN Meds: prochlorperazine, sodium chloride flush, promethazine **OR** ondansetron, polyethylene glycol, acetaminophen **OR** acetaminophen, glucose, dextrose, glucagon (rDNA), dextrose    Labs:   Recent Labs     12/23/20  0452 12/24/20  0515 12/25/20  0511   WBC 7.7 7.3 6.5   HGB 12.4 12.0 11.4*   HCT 36.4* 35.7* 33.0*    274 252     Recent Labs     12/23/20  0452 12/24/20  0515 12/25/20  0511   * 134* 139   K 3.4 2.8* 3.7   CL 92* 97 102   CO2 29 28 27   BUN 15 12 13   CREATININE 1.19* 0.94* 1.03*   CALCIUM 9.0 8.4* 8.7   PHOS  --   --  2.7     No results for input(s): AST, ALT, BILIDIR, BILITOT, ALKPHOS in the last 72 hours. No results for input(s): INR in the last 72 hours. Recent Labs     12/23/20  0452 12/24/20  0515 12/25/20  0511   CKTOTAL 563* 432* 480*       Urinalysis:   No results found for: [place], BACTERIA, RBCUA, BLOODU, SPECGRAV, GLUCOSEU    Radiology:   Most recent    EEG No procedure found. MRI of Brain No results found for this or any previous visit. Results for orders placed during the hospital encounter of 12/21/20   MRI BRAIN WO CONTRAST    Narrative EXAMINATION:  MRI BRAIN WO CONTRAST    HISTORY:   stroke . G43.809 Migraine variant ICD10    TECHNIQUE:  Routine noncontrast MRI protocol including diffusion and gradient echo images. COMPARISON: CT head 12/21/2020      RESULT:    Acute Change: There is no evidence of restricted diffusion to suggest an acute infarct. Hemorrhage:    No evidence of prior parenchymal hemorrhage. Mass Lesion/ Mass Effect:    No evidence of an intracranial mass or extra-axial fluid collection. No significant mass effect. Chronic Change:  Scattered punctate foci of increased T2 and FLAIR signal are noted in the supratentorial white matter which is a nonspecific finding, but likely represents minimal chronic microvascular ischemia. Parenchyma:   No significant volume loss for age. The brain parenchyma is otherwise within normal limits of signal intensity and morphology. Ventricles:     Normal caliber and morphology.     Skull Base:    Hypothalamic and pituitary region are grossly normal.   Craniocervical junction is normal. No significant marrow replacement process. Vasculature:    Major intracranial arterial structures, and dural venous sinuses show typical flow void, suggesting patency by spin echo criteria. Other:  Polyps or mucous retention cysts in the maxillary sinuses. Mild mucosal thickening within the ethmoid air cells. Trace fluid within the mastoid air cells, nonspecific. The orbits are unremarkable. Soft tissues are unremarkable. Impression No acute intracranial process. MRA of the Head and Neck: No results found for this or any previous visit. No results found for this or any previous visit. No results found for this or any previous visit. CT of the Head:   Results for orders placed during the hospital encounter of 12/21/20   CT Head WO Contrast    Narrative EXAMINATION:  CT HEAD WO CONTRAST    HISTORY:   frontal headache and vertigo    Vomiting     TECHNIQUE:  Serial axial images without IV contrast were obtained from the vertex to the foramen magnum. Sagittal and coronal reconstructions. All CT scans at this facility use dose modulation, iterative reconstruction, and/or weight based dosing when appropriate to reduce radiation dose to as low as reasonably achievable. COMPARISON:  CT head 9/27/2013      RESULT:    Acute change:   No evidence of an acute infarct or other acute parenchymal process. Hemorrhage:    No evidence of acute intracranial hemorrhage. Mass Lesion / Mass Effect:   There is no evidence of an intracranial mass or extraaxial fluid collection. No significant mass effect. Chronic change:   None apparent. Parenchyma: There is no significant volume loss for age. The brain parenchyma is otherwise within normal limits for age. Ventricles: The ventricles are within normal limits of size and configuration for age.      Paranasal sinuses and skull base:  Mild paranasal sinus disease in the visualized sinuses with areas of polypoid thickening. Mastoid air cells are clear. The skull base is unremarkable. Soft tissues unremarkable. Impression No acute intracranial process. Paranasal sinus disease. No results found for this or any previous visit. No results found for this or any previous visit. Carotid duplex: No results found for this or any previous visit. No results found for this or any previous visit. No results found for this or any previous visit. Echo No results found for this or any previous visit. Assessment/Plan:    Complicated basilar migraine with headaches and vertigo or truly in isolation these are migraines associated with possible Ménière's disease. An underlying cerebellar infarct must be ruled out given her other findings of significant nausea and vomiting. She also complains of gait ataxia which is new and therefore evaluation for cerebrovascular event. Now we will start her on hydroxyzine and Antivert together and a few doses of Imitrex unless she has a stroke then this could be contraindicated. Patient has few risk factors for cerebrovascular disease which could be contributing. Depending on the results of the same will further advise. Of note patient does not have any myelopathic signs to suggest spinal cord etiology. MRI brain normal.  ESR 42      CK-MB 7.1  - Endo on consult    Patient was given Antivert and hydroxyzine yesterday 2 times which seemed to improve her symptoms. She reports doses today reduced the frequency and severity of symptoms. Was found to have severe hypothyroidism and endocrinology is on consult. Was started on 300 mcg of synthroid today. Patient is still having episodes of vertigo throughout the day and remains a fall risk. Hypothyroidism may be the cause or a contributing factor to her vertigo, although improvement with antihistamines and imitrex could point toward BPPV or atypical migraine.  There may be several processes causing her symptoms. Will continue to monitor and may consider Epley maneuvers in the future if symptoms do not resolve after thyroid corrected. She has improved though she still has some visual blurring will give her 1 dose of Imitrex today again. She is due for discharge tomorrow she is about 70% better and will continue on with the medications and will require a walker and some physical therapy as an outpatient. Moe Arora MD, [address], American Board of Psychiatry & Neurology  Board Certified in Vascular Neurology  Board Certified in Neuromuscular Medicine  Certified in [address]         Electronically signed by Madelin Kohli MD on 12/25/2020 at 12:56 PM

## 2020-12-25 NOTE — PLAN OF CARE
Problem: Falls - Risk of:  Goal: Will remain free from falls  Description: Will remain free from falls  Outcome: Ongoing  Goal: Absence of physical injury  Description: Absence of physical injury  Outcome: Ongoing     Problem: IP BALANCE  Goal: LTG - Patient will maintain balance to allow for safe/functional mobility  Outcome: Ongoing     Problem: IP BALANCE  Goal: LTG - patient will maintain standing balance to allow for completion of daily activities  Outcome: Ongoing

## 2020-12-26 VITALS
HEART RATE: 61 BPM | WEIGHT: 189.6 LBS | RESPIRATION RATE: 18 BRPM | SYSTOLIC BLOOD PRESSURE: 149 MMHG | DIASTOLIC BLOOD PRESSURE: 89 MMHG | TEMPERATURE: 97.2 F | BODY MASS INDEX: 40.9 KG/M2 | HEIGHT: 57 IN | OXYGEN SATURATION: 97 %

## 2020-12-26 LAB
ALBUMIN SERPL-MCNC: 3.7 G/DL (ref 3.5–4.6)
ANION GAP SERPL CALCULATED.3IONS-SCNC: 16 MEQ/L (ref 9–15)
BASOPHILS ABSOLUTE: 0.1 K/UL (ref 0–0.2)
BASOPHILS RELATIVE PERCENT: 1 %
BUN BLDV-MCNC: 15 MG/DL (ref 6–20)
CALCIUM SERPL-MCNC: 8.9 MG/DL (ref 8.5–9.9)
CHLORIDE BLD-SCNC: 102 MEQ/L (ref 95–107)
CK MB: 3.9 NG/ML (ref 0–3.8)
CO2: 22 MEQ/L (ref 20–31)
CREAT SERPL-MCNC: 1.17 MG/DL (ref 0.5–0.9)
CREATINE KINASE-MB INDEX: 1 % (ref 0–3.5)
EOSINOPHILS ABSOLUTE: 0.6 K/UL (ref 0–0.7)
EOSINOPHILS RELATIVE PERCENT: 8 %
GFR AFRICAN AMERICAN: 60
GFR NON-AFRICAN AMERICAN: 49.6
GLUCOSE BLD-MCNC: 116 MG/DL (ref 60–115)
GLUCOSE BLD-MCNC: 118 MG/DL (ref 60–115)
GLUCOSE BLD-MCNC: 153 MG/DL (ref 70–99)
HCT VFR BLD CALC: 31.5 % (ref 37–47)
HEMOGLOBIN: 10.9 G/DL (ref 12–16)
LYMPHOCYTES ABSOLUTE: 1.9 K/UL (ref 1–4.8)
LYMPHOCYTES RELATIVE PERCENT: 26.9 %
MAGNESIUM: 2.1 MG/DL (ref 1.7–2.4)
MCH RBC QN AUTO: 32.5 PG (ref 27–31.3)
MCHC RBC AUTO-ENTMCNC: 34.7 % (ref 33–37)
MCV RBC AUTO: 93.8 FL (ref 82–100)
MONOCYTES ABSOLUTE: 0.4 K/UL (ref 0.2–0.8)
MONOCYTES RELATIVE PERCENT: 5.5 %
NEUTROPHILS ABSOLUTE: 4.2 K/UL (ref 1.4–6.5)
NEUTROPHILS RELATIVE PERCENT: 58.6 %
PDW BLD-RTO: 15 % (ref 11.5–14.5)
PERFORMED ON: ABNORMAL
PERFORMED ON: ABNORMAL
PHOSPHORUS: 3.8 MG/DL (ref 2.3–4.8)
PLATELET # BLD: 264 K/UL (ref 130–400)
POTASSIUM SERPL-SCNC: 3.3 MEQ/L (ref 3.4–4.9)
RBC # BLD: 3.35 M/UL (ref 4.2–5.4)
SODIUM BLD-SCNC: 140 MEQ/L (ref 135–144)
TOTAL CK: 397 U/L (ref 0–170)
WBC # BLD: 7.2 K/UL (ref 4.8–10.8)

## 2020-12-26 PROCEDURE — 6370000000 HC RX 637 (ALT 250 FOR IP): Performed by: PSYCHIATRY & NEUROLOGY

## 2020-12-26 PROCEDURE — APPSS30 APP SPLIT SHARED TIME 16-30 MINUTES: Performed by: STUDENT IN AN ORGANIZED HEALTH CARE EDUCATION/TRAINING PROGRAM

## 2020-12-26 PROCEDURE — 82550 ASSAY OF CK (CPK): CPT

## 2020-12-26 PROCEDURE — 85025 COMPLETE CBC W/AUTO DIFF WBC: CPT

## 2020-12-26 PROCEDURE — 80069 RENAL FUNCTION PANEL: CPT

## 2020-12-26 PROCEDURE — 82553 CREATINE MB FRACTION: CPT

## 2020-12-26 PROCEDURE — 6370000000 HC RX 637 (ALT 250 FOR IP): Performed by: INTERNAL MEDICINE

## 2020-12-26 PROCEDURE — 99233 SBSQ HOSP IP/OBS HIGH 50: CPT | Performed by: PSYCHIATRY & NEUROLOGY

## 2020-12-26 PROCEDURE — 83735 ASSAY OF MAGNESIUM: CPT

## 2020-12-26 PROCEDURE — 36415 COLL VENOUS BLD VENIPUNCTURE: CPT

## 2020-12-26 PROCEDURE — 6360000002 HC RX W HCPCS: Performed by: INTERNAL MEDICINE

## 2020-12-26 RX ORDER — POTASSIUM CHLORIDE 20 MEQ/1
20 TABLET, EXTENDED RELEASE ORAL ONCE
Status: COMPLETED | OUTPATIENT
Start: 2020-12-26 | End: 2020-12-26

## 2020-12-26 RX ADMIN — CETIRIZINE HYDROCHLORIDE 10 MG: 10 TABLET, FILM COATED ORAL at 09:45

## 2020-12-26 RX ADMIN — ACETAMINOPHEN 650 MG: 325 TABLET ORAL at 06:59

## 2020-12-26 RX ADMIN — HYDROXYZINE PAMOATE 25 MG: 25 CAPSULE ORAL at 09:45

## 2020-12-26 RX ADMIN — POTASSIUM CHLORIDE 20 MEQ: 20 TABLET, EXTENDED RELEASE ORAL at 12:11

## 2020-12-26 RX ADMIN — ENOXAPARIN SODIUM 40 MG: 100 INJECTION SUBCUTANEOUS at 09:45

## 2020-12-26 RX ADMIN — FLUTICASONE PROPIONATE 1 SPRAY: 50 SPRAY, METERED NASAL at 09:46

## 2020-12-26 RX ADMIN — MECLIZINE HYDROCHLORIDE 25 MG: 25 TABLET ORAL at 09:45

## 2020-12-26 RX ADMIN — LEVOTHYROXINE SODIUM 300 MCG: 0.1 TABLET ORAL at 06:58

## 2020-12-26 ASSESSMENT — ENCOUNTER SYMPTOMS
TROUBLE SWALLOWING: 0
SHORTNESS OF BREATH: 0
VOMITING: 0
WHEEZING: 0
DIARRHEA: 0
SORE THROAT: 0
CHEST TIGHTNESS: 0
NAUSEA: 0

## 2020-12-26 ASSESSMENT — PAIN SCALES - GENERAL
PAINLEVEL_OUTOF10: 4
PAINLEVEL_OUTOF10: 0

## 2020-12-26 NOTE — DISCHARGE INSTR - COC
Continuity of Care Form    Patient Name: Jayne Sandifer   :  1975  MRN:  [id number]    Admit date:  2020  Discharge date:  2020    Code Status Order: Full Code   Advance Directives:   Advance Care Flowsheet Documentation     Date/Time Healthcare Directive Type of Healthcare Directive Copy in [address] Agent's Name Healthcare Agent's Phone Number    20 1520  No, patient does not have an advance directive for healthcare treatment -- -- -- -- --          Admitting Physician:  Dorothy Hernandez DO  PCP: Lashay Payne PA-C    Discharging Nurse: [facility] Unit/Room#: M684/P808-16  Discharging Unit Phone Number: [phone]    Emergency Contact:   Extended Emergency Contact Information  Primary Emergency Contact: [place],Eugene  Address: [address] [place] [address] Phone: [phone]  Relation: Spouse    Past Surgical History:  Past Surgical History:   Procedure Laterality Date    DENTAL SURGERY      ENDOMETRIAL BIOPSY  2017    Alice lala    INNER EAR SURGERY Right 1993    ear perforation    ROTATOR CUFF REPAIR Left 2014    TONSILLECTOMY AND ADENOIDECTOMY      TYMPANOSTOMY TUBE PLACEMENT         Immunization History:   Immunization History   Administered Date(s) Administered    PPD Test 2019       Active Problems:  Patient Active Problem List   Diagnosis Code    Essential hypertension I10    Acquired hypothyroidism E03.9    Type 2 diabetes mellitus without complication, without long-term current use of insulin ([facility] [address] E11.9    Acute otitis media with perforation, right H66.91, H72.91    History of perforation of tympanic membrane Z86.69    Migraine variant G43.809    Hypothyroidism P59.6    Complicated migraine N25. 109    Dizziness R42    Benign paroxysmal positional vertigo due to bilateral vestibular disorder H81.13    Ataxic gait R26.0 NOT a DME order):  walker  Other Treatments: None    Patient's personal belongings (please select all that are sent with patient):  Glasses    RN SIGNATURE:  Electronically signed by Etelvina Todd RN on 12/26/20 at 11:49 AM EST    CASE MANAGEMENT/SOCIAL WORK SECTION    Inpatient Status Date: ***    Readmission Risk Assessment Score:  Readmission Risk              Risk of Unplanned Readmission:        13           Discharging to Facility/ Agency   · Name:   · Address:  · Phone:  · Fax:    Dialysis Facility (if applicable)   · Name:  · Address:  · Dialysis Schedule:  · Phone:  · Fax:    / signature: {Esignature:[id number]}    PHYSICIAN SECTION    Prognosis: {Prognosis:1948154978}    Condition at Discharge: [address] [place] [address] Patient Condition:[id number]}    Rehab Potential (if transferring to Rehab): {Prognosis:8219403376}    Recommended Labs or Other Treatments After Discharge: ***    Physician Certification: I certify the above information and transfer of Adela Santacruz  is necessary for the continuing treatment of the diagnosis listed and that she requires {Admit to Appropriate Level of Care:13120} for {GREATER/LESS:252198836} 30 days.      Update Admission H&P: {CHP DME Changes in KNNQW:621838974}    PHYSICIAN SIGNATURE:  {Esignature:[id number]}

## 2020-12-26 NOTE — FLOWSHEET NOTE
3461- Shift assessment completed at this time, Pt A&Ox4, denies chest pain/SOB, denies  Nausea/vomiting, PERRLA, nystagmus noted, pt complains of blurry/double vision to right eye, strength noted to be equal in bilateral upper and lower extremities, scratches and scabs noted to bilateral upper and lower extremities, pt denies difficulty urinating and having bowel movements, pt denies pain, bed alarm engaged, call light in reach-KGW  1340- Discharge instructions gone over with patient, pt denies further questions at this time-KGW  Electronically signed by Ashanti Townsend RN on 12/26/2020

## 2020-12-26 NOTE — PROGRESS NOTES
[facility] Neurology Daily Progress Note  Name: Holger Blair  Age: 50 y.o. Gender: female  CodeStatus: Full Code  Allergies: No Known Allergies    Chief Complaint:Headache    Primary Care Provider: Lisy Gonzalez PA-C  InpatientTreatment Team: Treatment Team: Attending Provider: Lamonte Burr MD; Consulting Physician: Marin Ha MD; Consulting Physician: Bailey Carrasco MD; : Miguel Richardson RN; Patient Care Tech: Larry Quevedo; Utilization Reviewer: Kunal Andres RN; Registered Nurse: Asa Ramos RN  Admission Date: 12/21/2020      HPI     HPI 45 right-handed female who presents with a headache she has bifrontal headache starting last night.  She also had considerable vertigo with the same. Gregor Murdock went to bed and awoke with a headache and felt drunk.  Is under a large amount of stress lately financially. Gemini Adames does have history of vertigo and headaches in the past.  The headaches can last for a few hours or sometimes few days she has not been seen by neurology she is not on any preventive medications.  She has been vomiting with a headache and hurts badly.  Also reported some tingling of the right side of the face and upper extremity.  She is not complaining of this at this time.  She reports her walking is off since this has occurred. Gemini Adames is not able to walk but is good strength in the legs.  She has photosensitivity.  Denies any hearing loss or tinnitus or recent respiratory tract infections.  She has previous history of otitis media with perforation. Findings were discussed with the physical therapist and the nurse and she walks with an end-stage abasia gait. Patient requires a walker. CT angiograms were reviewed     Patient is alert and oriented x3, in no acute distress, and cooperative. States that the episodes of vertigo have been less frequent and less severe since starting the meclizine and hydroxyzine.   States she is able to ambulate to the bathroom and move from the bed to the chair. Denies any episodes of nausea or vomiting in the past 2 days. Her appetite is good. Has some blurry vision in her right eye which has been longstanding and states she will see ophthalmology soon as she is discharged. To have [facility] home health at discharge and ambulate with a walker. Denies any new or worsening symptoms including chest pain, shortness of breath, nausea, vomiting, or diarrhea. Dizziness better, ambultes well with walked    Vitals:    12/26/20 0825   BP: (!) 149/89   Pulse: 61   Resp: 18   Temp: 97.2 °F (36.2 °C)   SpO2: 97%      Review of Systems   Constitutional: Negative for appetite change, chills and fever. HENT: Positive for congestion. Negative for sore throat and trouble swallowing. Eyes:        Blurry vision in right eye. Respiratory: Negative for chest tightness, shortness of breath and wheezing. Cardiovascular: Negative for chest pain. Gastrointestinal: Negative for diarrhea, nausea and vomiting. Genitourinary: Negative. Musculoskeletal: Positive for gait problem. Negative for neck pain and neck stiffness. Neurological: Positive for dizziness, weakness and headaches. Negative for tremors, seizures, syncope, facial asymmetry, speech difficulty, light-headedness and numbness. Psychiatric/Behavioral: Negative. Physical Exam  Vitals signs and nursing note reviewed. Constitutional:       Appearance: Normal appearance. HENT:      Head: Normocephalic and atraumatic. Eyes:      Conjunctiva/sclera: Conjunctivae normal.      Comments: Continues to have bilateral nystagmus   Cardiovascular:      Rate and Rhythm: Normal rate and regular rhythm. Pulses: Normal pulses. Heart sounds: Normal heart sounds. Pulmonary:      Effort: Pulmonary effort is normal.      Breath sounds: Normal breath sounds. Musculoskeletal: Normal range of motion. Skin:     General: Skin is warm and dry.    Neurological:      General: No focal deficit present. Mental Status: She is alert and oriented to person, place, and time. Coordination: Finger-Nose-Finger Test normal.   Psychiatric:         Mood and Affect: Mood normal.         Behavior: Behavior normal.     less dizzy  Neurologic Exam     Mental Status   Oriented to person, place, and time. Gait, Coordination, and Reflexes     Coordination   Finger to nose coordination: normal    Tremor   Resting tremor: absent  Intention tremor: absent  Action tremor: absent      Walked well with PT and walker    Medications:  Reviewed    Infusion Medications:    sodium chloride Stopped (12/25/20 1231)    dextrose       Scheduled Medications:    hydrOXYzine  25 mg Oral TID    meclizine  25 mg Oral TID    fluticasone  1 spray Nasal Daily    cetirizine  10 mg Oral Daily    [Held by provider] atorvastatin  40 mg Oral Daily    levothyroxine  300 mcg Oral Daily    sodium chloride flush  10 mL Intravenous 2 times per day    enoxaparin  40 mg Subcutaneous Daily    insulin lispro  0-6 Units Subcutaneous TID WC    insulin lispro  0-3 Units Subcutaneous Nightly     PRN Meds: prochlorperazine, sodium chloride flush, promethazine **OR** ondansetron, polyethylene glycol, acetaminophen **OR** acetaminophen, glucose, dextrose, glucagon (rDNA), dextrose    Labs:   Recent Labs     12/24/20  0515 12/25/20  0511 12/26/20  0508   WBC 7.3 6.5 7.2   HGB 12.0 11.4* 10.9*   HCT 35.7* 33.0* 31.5*    252 264     Recent Labs     12/24/20  0515 12/25/20  0511 12/26/20  0508   * 139 140   K 2.8* 3.7 3.3*   CL 97 102 102   CO2 28 27 22   BUN 12 13 15   CREATININE 0.94* 1.03* 1.17*   CALCIUM 8.4* 8.7 8.9   PHOS  --  2.7 3.8     No results for input(s): AST, ALT, BILIDIR, BILITOT, ALKPHOS in the last 72 hours. No results for input(s): INR in the last 72 hours.   Recent Labs     12/24/20  0515 12/25/20  0511 12/26/20  0508   CKTOTAL 432* 480* 397*       Urinalysis:   No results found for: NITRU, WBCUA, BACTERIA, Paulino Guthrie, [address], [facility] [address]    Radiology:   Most recent    EEG No procedure found. MRI of Brain No results found for this or any previous visit. Results for orders placed during the hospital encounter of 12/21/20   MRI BRAIN WO CONTRAST    Narrative EXAMINATION:  MRI BRAIN WO CONTRAST    HISTORY:   stroke . G43.809 Migraine variant ICD10    TECHNIQUE:  Routine noncontrast MRI protocol including diffusion and gradient echo images. COMPARISON: CT head 12/21/2020      RESULT:    Acute Change: There is no evidence of restricted diffusion to suggest an acute infarct. Hemorrhage:    No evidence of prior parenchymal hemorrhage. Mass Lesion/ Mass Effect:    No evidence of an intracranial mass or extra-axial fluid collection. No significant mass effect. Chronic Change:  Scattered punctate foci of increased T2 and FLAIR signal are noted in the supratentorial white matter which is a nonspecific finding, but likely represents minimal chronic microvascular ischemia. Parenchyma:   No significant volume loss for age. The brain parenchyma is otherwise within normal limits of signal intensity and morphology. Ventricles:     Normal caliber and morphology. Skull Base:    Hypothalamic and pituitary region are grossly normal.   Craniocervical junction is normal. No significant marrow replacement process. Vasculature:    Major intracranial arterial structures, and dural venous sinuses show typical flow void, suggesting patency by spin echo criteria. Other:  Polyps or mucous retention cysts in the maxillary sinuses. Mild mucosal thickening within the ethmoid air cells. Trace fluid within the mastoid air cells, nonspecific. The orbits are unremarkable. Soft tissues are unremarkable. Impression No acute intracranial process. MRA of the Head and Neck: No results found for this or any previous visit. No results found for this or any previous visit. No results found for this or any previous visit. CT of the Head:   Results for orders placed during the hospital encounter of 12/21/20   CT Head WO Contrast    Narrative EXAMINATION:  CT HEAD WO CONTRAST    HISTORY:   frontal headache and vertigo    Vomiting     TECHNIQUE:  Serial axial images without IV contrast were obtained from the vertex to the foramen magnum. Sagittal and coronal reconstructions. All CT scans at this facility use dose modulation, iterative reconstruction, and/or weight based dosing when appropriate to reduce radiation dose to as low as reasonably achievable. COMPARISON:  CT head 9/27/2013      RESULT:    Acute change:   No evidence of an acute infarct or other acute parenchymal process. Hemorrhage:    No evidence of acute intracranial hemorrhage. Mass Lesion / Mass Effect:   There is no evidence of an intracranial mass or extraaxial fluid collection. No significant mass effect. Chronic change:   None apparent. Parenchyma: There is no significant volume loss for age. The brain parenchyma is otherwise within normal limits for age. Ventricles: The ventricles are within normal limits of size and configuration for age. Paranasal sinuses and skull base:  Mild paranasal sinus disease in the visualized sinuses with areas of polypoid thickening. Mastoid air cells are clear. The skull base is unremarkable. Soft tissues unremarkable. Impression No acute intracranial process. Paranasal sinus disease. No results found for this or any previous visit. No results found for this or any previous visit. Carotid duplex: No results found for this or any previous visit. No results found for this or any previous visit. No results found for this or any previous visit. Echo No results found for this or any previous visit.           Assessment/Plan:    Complicated basilar migraine with headaches and vertigo or truly in isolation these are migraines associated with possible Ménière's disease.  An underlying cerebellar infarct must be ruled out given her other findings of significant nausea and vomiting.  She also complains of gait ataxia which is new and therefore evaluation for cerebrovascular event.  Now we will start her on hydroxyzine and Antivert together and a few doses of Imitrex unless she has a stroke then this could be contraindicated. Patient has few risk factors for cerebrovascular disease which could be contributing.  Depending on the results of the same will further advise.  Of note patient does not have any myelopathic signs to suggest spinal cord etiology.     MRI brain normal.  ESR 42      CK-MB 7.1  - Endo on consult  CTA head- normal  CTA neck- no hemodynamically significant stenosis   Vertebral arteries- no focal stenosis, aneurysm or disection      Was found to have severe hypothyroidism and endocrinology is on consult. Was started on 300 mcg of synthroid today. Patient continues to improve and episodes of vertigo are less severe and less frequent. Headache is mild. Blurring in right eye has been longstanding and she will see ophthalmology as an outpatient. Patient is neurologically stable and okay for discharge. To follow-up with neurology in 4 to 6 weeks. I have personally performed a face to face diagnostic evaluation on this patient, reviewed all data and investigations, and am the sole provider of all clinical decisions on the neurological status of this patient. pts dizziness has improved,  Will walk with walker,  Ok d/c with antivert and hydroxyzine for now    Moe Herbert MD, Katherin Mixon, American Board of Psychiatry & Neurology  Board Certified in Vascular Neurology  Board Certified in Neuromuscular Medicine  Certified in . [facility] physicians: Dr Sierra Herbert    Electronically signed by Dilshad Esparza PA-C on 12/26/2020 at 10:12 AM

## 2020-12-26 NOTE — PROGRESS NOTES
Hospitalist Progress Note      PCP: Oracio Nogueira PA-C    Date of Admission: 12/21/2020    Chief Complaint:  No acute events, afebrile, stable HD,     Medications:  Reviewed    Infusion Medications    sodium chloride Stopped (12/25/20 1231)    dextrose       Scheduled Medications    potassium chloride  20 mEq Oral Once    hydrOXYzine  25 mg Oral TID    meclizine  25 mg Oral TID    fluticasone  1 spray Nasal Daily    cetirizine  10 mg Oral Daily    [Held by provider] atorvastatin  40 mg Oral Daily    levothyroxine  300 mcg Oral Daily    sodium chloride flush  10 mL Intravenous 2 times per day    enoxaparin  40 mg Subcutaneous Daily    insulin lispro  0-6 Units Subcutaneous TID WC    insulin lispro  0-3 Units Subcutaneous Nightly     PRN Meds: prochlorperazine, sodium chloride flush, promethazine **OR** ondansetron, polyethylene glycol, acetaminophen **OR** acetaminophen, glucose, dextrose, glucagon (rDNA), dextrose    No intake or output data in the 24 hours ending 12/26/20 1109    Exam:    BP (!) 149/89   Pulse 61   Temp 97.2 °F (36.2 °C) (Oral)   Resp 18   Ht 4' 9\" (1.448 m)   Wt 189 lb 9.6 oz (86 kg)   SpO2 97%   BMI 41.03 kg/m²     General appearance: appears stated age and cooperative. Respiratory: clear to auscultation bilaterally. Cardiovascular: Regular rate and rhythm with normal S1/S2. Abdomen: Soft, active bowel sounds. Musculoskeletal: No edema bilaterally. Labs:   Recent Labs     12/24/20  0515 12/25/20  0511 12/26/20  0508   WBC 7.3 6.5 7.2   HGB 12.0 11.4* 10.9*   HCT 35.7* 33.0* 31.5*    252 264     Recent Labs     12/24/20  0515 12/25/20  0511 12/26/20  0508   * 139 140   K 2.8* 3.7 3.3*   CL 97 102 102   CO2 28 27 22   BUN 12 13 15   CREATININE 0.94* 1.03* 1.17*   CALCIUM 8.4* 8.7 8.9   PHOS  --  2.7 3.8     No results for input(s): AST, ALT, BILIDIR, BILITOT, ALKPHOS in the last 72 hours. No results for input(s): INR in the last 72 hours.   Recent Vertebral arteries are codominant. IMPRESSION: Negative CTA of the neck. MRI BRAIN WO CONTRAST   Final Result      No acute intracranial process. CT Head WO Contrast   Final Result      No acute intracranial process. Paranasal sinus disease.                  Assessment/Plan:    57-year-old female with history of obesity, DM2, hypothyroidism who presented with severe frontal headache with associated right side vision blurriness and right-sided tingling.     Complicated migraine  - CTA and MRI of the head and neck were negative for acute findings  - clinically improved  - managed by neurology    Uncontrolled hypothyroidism  - due to noncompliance  - resumed Synthroid  - followed by endocrinology    Hypokalemia   - replaced     DM2  - controlled    Hyperlipidemia   - uncontrolled, continue home meds       Disposition - home with [facility] today                 Electronically signed by Anne Marie Ware MD on 12/26/2020 at 11:09 AM

## 2020-12-28 ENCOUNTER — TELEPHONE (OUTPATIENT)
Dept: FAMILY MEDICINE CLINIC | Age: 45
End: 2020-12-28

## 2020-12-28 ENCOUNTER — VIRTUAL VISIT (OUTPATIENT)
Dept: FAMILY MEDICINE CLINIC | Age: 45
End: 2020-12-28
Payer: COMMERCIAL

## 2020-12-28 PROCEDURE — 99443 PR PHYS/QHP TELEPHONE EVALUATION 21-30 MIN: CPT | Performed by: FAMILY MEDICINE

## 2020-12-28 RX ORDER — LEVOTHYROXINE SODIUM 0.2 MG/1
TABLET ORAL
Qty: 30 TABLET | Refills: 1 | Status: SHIPPED | OUTPATIENT
Start: 2020-12-28 | End: 2021-04-08 | Stop reason: SDUPTHER

## 2020-12-28 NOTE — PROGRESS NOTES
Patient: Shanice Dong    YOB: 1975    Date: 12/28/20    Chief Complaint   Patient presents with    Blurred Vision       Patient Active Problem List    Diagnosis Date Noted    Complicated migraine     Dizziness     Benign paroxysmal positional vertigo due to bilateral vestibular disorder     Ataxic gait     Hypothyroidism 12/22/2020    Migraine variant 12/21/2020    Acute otitis media with perforation, right 01/21/2019    History of perforation of tympanic membrane 01/21/2019    Essential hypertension 10/25/2016    Acquired hypothyroidism 10/25/2016    Type 2 diabetes mellitus without complication, without long-term current use of insulin ([facility] [address] 10/25/2016       No Known Allergies        There were no vitals filed for this visit. There is no height or weight on file to calculate BMI. HPI    TELEHEALTH EVALUATION -- Audio/Visual (During YETYZ-18 public health emergency        Due to COVID 19 outbreak, patient's office visit was converted to a virtual visit. The patient was identified at the start of the visit. Patient was contacted and agreed to proceed with a virtual visit via Telephone Visit Time spent in visit with management in direct patient care 23 minutes. The risks and benefits of converting to a virtual visit were discussed in light of the current infectious disease epidemic. Patient also understood that insurance coverage and co-pays are up to their individual insurance plans and this is a billable visit. Pursuant to the emergency declaration under the [facility], 1135 waiver authority and the [employer] and [employer] General Act, this Virtual  Visit was conducted, with patient's consent, to reduce the patient's risk of exposure to COVID-19 and provide continuity of care for an established patient. Services were provided through a phone discussion virtually to substitute for in-person clinic visit. The patient was located home and myself, the provider is located home. Patient nancy phoenix (:  [birth year] ) has requested an audio/video evaluation for the following concern(s):     HPI:    Recently discharged from [facility] with stroke like symptoms. Neuro evaluated and Pt found to have HA and poss cerumen impaction. TSH way off also. She was not taking her med. This is a new patient to me. I have reviewed all of the past medical history, social history and family history. I have reviewed all of the information on medication, surgeries and previous testing and working diagnoses. I have reviewed the allergies and health maintenance information and correlated it into my decision making for this patient for care, diagnostics, consultations and treatment for today's visit. Review of Systems    Constitutional: Negative for fatigue, fever and sweats. HEENT: Negative for eye discharge and vision loss. Negative for ear drainage, hearing loss and nasal drainage. Respiratory: Negative for cough, dyspnea and wheezing. Cardiovascular:  Negative for chest pain, claudication and irregular heartbeat/palpitations. Gastrointestinal: Negative for abdominal pain, nausea, constipation and diarrhea. Genitourinary: Negative for dysuria, hematuria, polyuria, dysmenorrhea, menorrhagia and vaginal discharge. Metabolic/Endocrine: Negative for cold intolerance, heat intolerance, polydipsia and polyphagia. No unintended weight loss or weight gain. Neuro/Psychiatric: Negative for gait disturbance. Negative for psychiatric symptoms. Dermatologic: Negative for pruritus and rash. Musculoskeletal: Negative for bone/joint symptoms. No numbness or tingling. No loss of function. Hematology: Negative for bleeding and easy bruising. Immunology:  Negative for environmental allergies and food allergies.     Physical Exam Not able to be done as this was a phone visit. Patient was alert, oriented, appropriate, not SOB with talking and not in distress. Assessment:   Diagnosis Orders   1. Blurring of vision  Has FU with Ophth soon   2. Acquired hypothyroidism  levothyroxine (SYNTHROID) 200 MCG tablet  Pt was not taking the 150 mcg. TSH very high in hospital. Pt to start 200 and take every day. MELONIE Pierce to order FU labs at next appt in January 2021   3. Type 2 diabetes mellitus without complication, without long-term current use of insulin ([facility])  Stopped the metformin as she is checking her sugars daily and they are less than 130 at home. reduce glucophage to 500mg daily with meal              Plan:  Current Outpatient Medications   Medication Sig Dispense Refill    levothyroxine (SYNTHROID) 200 MCG tablet One PO a day 30 tablet 1    meclizine (ANTIVERT) 25 MG tablet Take 1 tablet by mouth 3 times daily for 10 days 30 tablet 0    fluticasone (FLONASE) 50 MCG/ACT nasal spray 1 spray by Each Nostril route daily 1 Bottle 0    ibuprofen (ADVIL;MOTRIN) 800 MG tablet TAKE 1 TABLET BY MOUTH EVERY 6 HOURS AS NEEDED FOR PAIN 120 tablet 1    simvastatin (ZOCOR) 80 MG tablet TAKE ONE TABLET BY MOUTH ONCE DAILY IN THE EVENING 30 tablet 3    loratadine (CLARITIN) 10 MG tablet Take 1 tablet by mouth daily 30 tablet 3    glucose monitoring kit (FREESTYLE) monitoring kit 1 kit by Does not apply route daily 1 kit 0    blood glucose test strips (ASCENSIA AUTODISC VI;ONE TOUCH ULTRA TEST VI) strip Test BID. DX E11.8 NIDDM 100 strip 6    EQ ALLERGY RELIEF 10 MG tablet TAKE 1 TABLET BY MOUTH ONCE DAILY 30 tablet 2    fluticasone (FLONASE) 50 MCG/ACT nasal spray USE 1 SPRAY(S) IN EACH NOSTRIL ONCE DAILY 1 Bottle 3    Lancets MISC Test BID.  DX E11.8 NIDDM 100 each 3    desoximetasone (TOPICORT) 0.05 % OINT oitment Apply to affected area twice daily as needed 45 g 1  Skin Protectants, Misc. (HYDROCERIN) CREA cream Apply topically 2 times daily 1 Container 3    glucose monitoring kit (FREESTYLE) monitoring kit 1 kit by Does not apply route daily DX E11.8 NIDDM 1 kit 0    nabumetone (RELAFEN) 500 MG tablet Take 1 tablet by mouth 2 times daily 60 tablet 3     No current facility-administered medications for this visit. No orders of the defined types were placed in this encounter. Orders Placed This Encounter   Medications    levothyroxine (SYNTHROID) 200 MCG tablet     Sig: One PO a day     Dispense:  30 tablet     Refill:  1             Return for keep appt with MELONIE Soto.     Dr. Zayas Anchors      12/28/20  11:26 AM

## 2020-12-28 NOTE — TELEPHONE ENCOUNTER
[facility] Trans[facility] Initial Follow Up Call    Outreach made within 2 business days of discharge: Yes    Patient: Sharan Islas Patient : 1975   MRN: [id number]  Reason for Admission: There are no discharge diagnoses documented for the most recent discharge. Discharge Date: 20       Spoke with: Patient    Discharge department/facility: [facility]    TCM Interactive Patient Contact:  Was patient able to fill all prescriptions: Yes  Was patient instructed to bring all medications to the follow-up visit: Yes  Is patient taking all medications as directed in the discharge summary? Yes  Does patient understand their discharge instructions: Yes  Does patient have questions or concerns that need addressed prior to 7-14 day follow up office visit: no    Scheduled appointment with PCP within 7-14 days - PATIENT HAD VISIT THIS MORNING WITH DR. Aristeo Flanagan WILL SEE HER REGULAR PCP AS SCHEDULED. PT STATES SHE IS DOING BETTER.     Follow Up  Future Appointments   Date Time Provider Isabel Romero   2021 12:30 PM Anika Morgan [facility]   2021  4:00 PM Moe Whitehead MD [address] [place] [address]

## 2021-01-12 ENCOUNTER — VIRTUAL VISIT (OUTPATIENT)
Dept: FAMILY MEDICINE CLINIC | Age: 46
End: 2021-01-12
Payer: COMMERCIAL

## 2021-01-12 DIAGNOSIS — J01.00 ACUTE MAXILLARY SINUSITIS, RECURRENCE NOT SPECIFIED: ICD-10-CM

## 2021-01-12 DIAGNOSIS — J30.9 ALLERGIC RHINITIS, UNSPECIFIED SEASONALITY, UNSPECIFIED TRIGGER: ICD-10-CM

## 2021-01-12 DIAGNOSIS — E03.9 ACQUIRED HYPOTHYROIDISM: Primary | ICD-10-CM

## 2021-01-12 DIAGNOSIS — R42 DIZZINESS: ICD-10-CM

## 2021-01-12 DIAGNOSIS — H53.9 VISUAL DISTURBANCE: ICD-10-CM

## 2021-01-12 DIAGNOSIS — R29.898 LEFT LEG WEAKNESS: ICD-10-CM

## 2021-01-12 DIAGNOSIS — J33.9 NASAL POLYP: ICD-10-CM

## 2021-01-12 PROCEDURE — 1111F DSCHRG MED/CURRENT MED MERGE: CPT | Performed by: PHYSICIAN ASSISTANT

## 2021-01-12 PROCEDURE — 99214 OFFICE O/P EST MOD 30 MIN: CPT | Performed by: PHYSICIAN ASSISTANT

## 2021-01-12 RX ORDER — LORATADINE 10 MG/1
10 TABLET ORAL DAILY
Qty: 30 TABLET | Refills: 3 | Status: CANCELLED | OUTPATIENT
Start: 2021-01-12

## 2021-01-12 NOTE — PROGRESS NOTES
Tawastintie [address] (:  1975) is a 50 y.o. female,Established patient, here for evaluation of the following chief complaint(s): No chief complaint on file. dizziness, leg weakness, hypothyroidism (uncontrolled)         Assessment & Plan    Diagnosis Orders   1. Acquired hypothyroidism      continue synthroid as directed   2. Visual disturbance      rec opth consult   3. Dizziness     4. Left leg weakness      referred for in home PT at discharge   5. Allergic rhinitis, unspecified seasonality, unspecified trigger     6. Nasal polyp      resume flonase add on claritin in evening if needed   7. Acute maxillary sinusitis, recurrence not specified  azithromycin (ZITHROMAX) 250 MG tablet         No orders of the defined types were placed in this encounter. Orders Placed This Encounter   Medications    azithromycin (ZITHROMAX) 250 MG tablet     Sig: Take 1 tablet by mouth See Admin Instructions for 5 days 500mg on day 1 followed by 250mg on days 2 - 5     Dispense:  6 tablet     Refill:  0     There are no discontinued medications. Return in about 6 weeks (around 2021), or if symptoms worsen or fail to improve, for repeat labs, follow up on medicaiton. Anika Stroud PA-C    Return in about 6 weeks (around 2021), or if symptoms worsen or fail to improve, for repeat labs, follow up on medicaiton. SUBJECTIVE/OBJECTIVE:  [facility]  Medicine doxy video visit due to concern for exposure to COVID-19 patient with complaint home complaint dizziness lower extremity weakness particularly of the left leg and visual disturbance since admission to ER on 2020. TSH done today grossly abnormal patient admits she stopped taking Synthroid  Several ago.   She has history of strabismus which seems worsened and c,o double vision of the left eye  Denies falls  Requesting notice to for unemployment  - does not feel capable of returning to work at this time  Review of Systems   Eyes: Positive for visual disturbance. Musculoskeletal: Positive for gait problem. Neurological: Positive for dizziness and weakness. All other systems reviewed and are negative. No flowsheet data found. Physical Exam  Constitutional:       Appearance: Normal appearance. HENT:      Head: Normocephalic and atraumatic. Eyes:      Conjunctiva/sclera: Conjunctivae normal.      Comments: strabismus   Pulmonary:      Effort: Pulmonary effort is normal. No respiratory distress. Skin:     General: Skin is dry. Neurological:      Mental Status: She is alert and oriented to person, place, and time. Psychiatric:         Mood and Affect: Mood normal.         Thought Content: Thought content normal.         Judgment: Judgment normal.              On this date 01/14/21 I have spent 20 minutes reviewing previous notes, test results and face to face (virtual) with the patient discussing the diagnosis and importance of compliance with the treatment plan as well as documenting on the day of the visit. Jenifer Day is a 50 y.o. female being evaluated by a Virtual Visit (video visit) encounter to address concerns as mentioned above. A caregiver was present when appropriate. Due to this being a TeleHealth encounter (During JXSHE-48 public health emergency), evaluation of the following organ systems was limited: Vitals/Constitutional/EENT/Resp/CV/GI//MS/Neuro/Skin/Heme-Lymph-Imm. Pursuant to the emergency declaration under the [address] [place] [address] authority and the [employer] Act, this Virtual Visit was conducted with patient's (and/or legal guardian's) consent, to reduce the patient's risk of exposure to COVID-19 and provide necessary medical care.   The patient (and/or legal guardian) has also been advised to contact this office for worsening conditions or problems, and seek emergency medical treatment and/or call 911 if deemed necessary. Patient identification was verified at the start of the visit: Yes    Services were provided through a video synchronous discussion virtually to substitute for in-person clinic visit. Patient was located at home and provider was located in office or at home. An electronic signature was used to authenticate this note.     --Anika Stroud PA-C

## 2021-01-13 ENCOUNTER — TELEPHONE (OUTPATIENT)
Dept: FAMILY MEDICINE CLINIC | Age: 46
End: 2021-01-13

## 2021-01-13 DIAGNOSIS — J30.9 ALLERGIC RHINITIS, UNSPECIFIED SEASONALITY, UNSPECIFIED TRIGGER: ICD-10-CM

## 2021-01-13 RX ORDER — LORATADINE 10 MG/1
10 TABLET ORAL DAILY
Qty: 30 TABLET | Refills: 3 | Status: SHIPPED | OUTPATIENT
Start: 2021-01-13 | End: 2022-01-31

## 2021-01-13 RX ORDER — MECLIZINE HYDROCHLORIDE 25 MG/1
25 TABLET ORAL 3 TIMES DAILY
Qty: 30 TABLET | Refills: 0 | Status: SHIPPED | OUTPATIENT
Start: 2021-01-13 | End: 2021-01-29 | Stop reason: SDUPTHER

## 2021-01-13 NOTE — TELEPHONE ENCOUNTER
Pt states that she had a VV yesterday 1/12 and MELONIE Stroud was suppose to send over the following prescription refills:    Claritin 10 MG tablet - 1 tab by mouth daily    Meclizine (ANTIVERT) 25 MG tablet    Pt is stating that she called pharmacy and was informed that nothing was sent over as of 10:30am 1/13/2021      [facility] pharmacy on [address]

## 2021-01-13 NOTE — TELEPHONE ENCOUNTER
Please provide letter that she is unable to work for a period of two months due to visual changes, lower extremity weakness and uncontrolled thyroid disorder.

## 2021-01-13 NOTE — TELEPHONE ENCOUNTER
Candi Nancy calls asking about the off work letter. This should include her full name, , last 4 of her SS#, and a claim ID # [id number]. Please fax this to unemployment @ [phone].

## 2021-01-14 ENCOUNTER — TELEPHONE (OUTPATIENT)
Dept: FAMILY MEDICINE CLINIC | Age: 46
End: 2021-01-14

## 2021-01-14 DIAGNOSIS — J33.9 NASAL POLYP: Primary | ICD-10-CM

## 2021-01-14 RX ORDER — AZITHROMYCIN 250 MG/1
250 TABLET, FILM COATED ORAL SEE ADMIN INSTRUCTIONS
Qty: 6 TABLET | Refills: 0 | Status: SHIPPED | OUTPATIENT
Start: 2021-01-14 | End: 2021-01-19

## 2021-01-14 NOTE — TELEPHONE ENCOUNTER
Cresencio Hassan is waiting to hear about the referral to Dr Silvina Owen. I don't see a referral in her chart. Please advise.

## 2021-01-25 ENCOUNTER — TELEPHONE (OUTPATIENT)
Dept: FAMILY MEDICINE CLINIC | Age: 46
End: 2021-01-25

## 2021-01-25 DIAGNOSIS — I10 ESSENTIAL HYPERTENSION: Primary | ICD-10-CM

## 2021-01-25 RX ORDER — LISINOPRIL 20 MG/1
20 TABLET ORAL DAILY
Qty: 30 TABLET | Refills: 5 | Status: SHIPPED | OUTPATIENT
Start: 2021-01-25 | End: 2022-05-11

## 2021-01-25 NOTE — TELEPHONE ENCOUNTER
Elizabet Pt called to report the patient has a resting BP of 169/109 and is complaining of frontal lobe migraine. She does not take any medication for blood pressure. Pt wanted you to be aware.

## 2021-01-25 NOTE — TELEPHONE ENCOUNTER
Spoke to pt. She is stable and resting and drinking plenty of water. Will get med as soon as it is ready. Is aware is something changes like. CP,SOB,blurred vision. ..etc to go to the hospital.

## 2021-01-29 RX ORDER — MECLIZINE HYDROCHLORIDE 25 MG/1
25 TABLET ORAL 3 TIMES DAILY
Qty: 30 TABLET | Refills: 1 | Status: SHIPPED | OUTPATIENT
Start: 2021-01-29 | End: 2021-02-08

## 2021-02-04 ENCOUNTER — OFFICE VISIT (OUTPATIENT)
Dept: FAMILY MEDICINE CLINIC | Age: 46
End: 2021-02-04
Payer: COMMERCIAL

## 2021-02-04 VITALS
OXYGEN SATURATION: 98 % | HEIGHT: 57 IN | DIASTOLIC BLOOD PRESSURE: 80 MMHG | TEMPERATURE: 98 F | RESPIRATION RATE: 14 BRPM | BODY MASS INDEX: 40.78 KG/M2 | HEART RATE: 67 BPM | SYSTOLIC BLOOD PRESSURE: 130 MMHG | WEIGHT: 189 LBS

## 2021-02-04 DIAGNOSIS — E11.9 TYPE 2 DIABETES MELLITUS WITHOUT COMPLICATION, WITHOUT LONG-TERM CURRENT USE OF INSULIN (HCC): Primary | ICD-10-CM

## 2021-02-04 DIAGNOSIS — D64.9 ANEMIA, UNSPECIFIED TYPE: ICD-10-CM

## 2021-02-04 DIAGNOSIS — R68.89 SENSATION OF FEELING COLD: ICD-10-CM

## 2021-02-04 DIAGNOSIS — E03.9 ACQUIRED HYPOTHYROIDISM: ICD-10-CM

## 2021-02-04 DIAGNOSIS — E11.9 TYPE 2 DIABETES MELLITUS WITHOUT COMPLICATION, WITHOUT LONG-TERM CURRENT USE OF INSULIN (HCC): ICD-10-CM

## 2021-02-04 DIAGNOSIS — M54.42 ACUTE MIDLINE LOW BACK PAIN WITH LEFT-SIDED SCIATICA: ICD-10-CM

## 2021-02-04 DIAGNOSIS — H92.01 OTALGIA OF RIGHT EAR: ICD-10-CM

## 2021-02-04 DIAGNOSIS — L29.9 ITCHY SKIN: ICD-10-CM

## 2021-02-04 LAB
ALBUMIN SERPL-MCNC: 3.6 G/DL (ref 3.5–4.6)
ALP BLD-CCNC: 84 U/L (ref 40–130)
ALT SERPL-CCNC: 29 U/L (ref 0–33)
ANION GAP SERPL CALCULATED.3IONS-SCNC: 13 MEQ/L (ref 9–15)
AST SERPL-CCNC: 26 U/L (ref 0–35)
BASOPHILS ABSOLUTE: 0.1 K/UL (ref 0–0.2)
BASOPHILS RELATIVE PERCENT: 1.1 %
BILIRUB SERPL-MCNC: <0.2 MG/DL (ref 0.2–0.7)
BUN BLDV-MCNC: 13 MG/DL (ref 6–20)
CALCIUM SERPL-MCNC: 9.2 MG/DL (ref 8.5–9.9)
CHLORIDE BLD-SCNC: 101 MEQ/L (ref 95–107)
CO2: 25 MEQ/L (ref 20–31)
CREAT SERPL-MCNC: 0.76 MG/DL (ref 0.5–0.9)
EOSINOPHILS ABSOLUTE: 0.3 K/UL (ref 0–0.7)
EOSINOPHILS RELATIVE PERCENT: 5.8 %
GFR AFRICAN AMERICAN: >60
GFR NON-AFRICAN AMERICAN: >60
GLOBULIN: 3.9 G/DL (ref 2.3–3.5)
GLUCOSE BLD-MCNC: 119 MG/DL (ref 70–99)
HBA1C MFR BLD: 5.8 %
HCT VFR BLD CALC: 36.4 % (ref 37–47)
HEMOGLOBIN: 12.2 G/DL (ref 12–16)
IRON SATURATION: 20 % (ref 11–46)
IRON: 50 UG/DL (ref 37–145)
LYMPHOCYTES ABSOLUTE: 1.1 K/UL (ref 1–4.8)
LYMPHOCYTES RELATIVE PERCENT: 19.4 %
MCH RBC QN AUTO: 31.5 PG (ref 27–31.3)
MCHC RBC AUTO-ENTMCNC: 33.6 % (ref 33–37)
MCV RBC AUTO: 93.8 FL (ref 82–100)
MONOCYTES ABSOLUTE: 0.4 K/UL (ref 0.2–0.8)
MONOCYTES RELATIVE PERCENT: 7.3 %
NEUTROPHILS ABSOLUTE: 3.8 K/UL (ref 1.4–6.5)
NEUTROPHILS RELATIVE PERCENT: 66.4 %
PDW BLD-RTO: 13.4 % (ref 11.5–14.5)
PLATELET # BLD: 287 K/UL (ref 130–400)
POTASSIUM SERPL-SCNC: 3.9 MEQ/L (ref 3.4–4.9)
RBC # BLD: 3.88 M/UL (ref 4.2–5.4)
SODIUM BLD-SCNC: 139 MEQ/L (ref 135–144)
TOTAL IRON BINDING CAPACITY: 249 UG/DL (ref 178–450)
TOTAL PROTEIN: 7.5 G/DL (ref 6.3–8)
TSH REFLEX: 1.23 UIU/ML (ref 0.44–3.86)
WBC # BLD: 5.8 K/UL (ref 4.8–10.8)

## 2021-02-04 PROCEDURE — G8417 CALC BMI ABV UP PARAM F/U: HCPCS | Performed by: PHYSICIAN ASSISTANT

## 2021-02-04 PROCEDURE — 2022F DILAT RTA XM EVC RTNOPTHY: CPT | Performed by: PHYSICIAN ASSISTANT

## 2021-02-04 PROCEDURE — 1036F TOBACCO NON-USER: CPT | Performed by: PHYSICIAN ASSISTANT

## 2021-02-04 PROCEDURE — 3044F HG A1C LEVEL LT 7.0%: CPT | Performed by: PHYSICIAN ASSISTANT

## 2021-02-04 PROCEDURE — G8427 DOCREV CUR MEDS BY ELIG CLIN: HCPCS | Performed by: PHYSICIAN ASSISTANT

## 2021-02-04 PROCEDURE — 83036 HEMOGLOBIN GLYCOSYLATED A1C: CPT | Performed by: PHYSICIAN ASSISTANT

## 2021-02-04 PROCEDURE — 99214 OFFICE O/P EST MOD 30 MIN: CPT | Performed by: PHYSICIAN ASSISTANT

## 2021-02-04 PROCEDURE — G8484 FLU IMMUNIZE NO ADMIN: HCPCS | Performed by: PHYSICIAN ASSISTANT

## 2021-02-04 RX ORDER — AMOXICILLIN AND CLAVULANATE POTASSIUM 875; 125 MG/1; MG/1
1 TABLET, FILM COATED ORAL 2 TIMES DAILY
Qty: 20 TABLET | Refills: 0 | Status: SHIPPED | OUTPATIENT
Start: 2021-02-04 | End: 2021-02-14

## 2021-02-04 ASSESSMENT — PATIENT HEALTH QUESTIONNAIRE - PHQ9
2. FEELING DOWN, DEPRESSED OR HOPELESS: 0
SUM OF ALL RESPONSES TO PHQ QUESTIONS 1-9: 0
SUM OF ALL RESPONSES TO PHQ9 QUESTIONS 1 & 2: 0

## 2021-02-04 NOTE — PROGRESS NOTES
Felicity Dougherty 11, 50 y.o. female presents today with:  Chief Complaint   Patient presents with    Diabetes    Hypertension    Hypothyroidism    Extremity Weakness     bilateral legs         Treatment Adherence:   Medication compliance:  compliant all of the time  Diet compliance:  compliant most of the time  Weight trend: stable  Current exercise: no regular exercise      Diabetes Mellitus Type 2: Current symptoms/problems include blurry vision. Home blood sugar records:  patient does not test  Any episodes of hypoglycemia? no  Eye exam current (within one year): no  Tobacco history: She  reports that she has never smoked. She has never used smokeless tobacco.   Daily Aspirin? Yes  Known diabetic complications: unknown        Lab Results   Component Value Date    LABA1C 6.8 (H) 12/21/2020    LABA1C 6.7 08/13/2020    LABA1C 6.2 01/22/2019     Lab Results   Component Value Date    LABMICR 2.40 (H) 10/25/2016    CREATININE 1.17 (H) 12/26/2020     Lab Results   Component Value Date    ALT 16 12/21/2020    AST 30 12/21/2020     Lab Results   Component Value Date    CHOL 343 (H) 12/22/2020    TRIG 185 (H) 12/22/2020    HDL 31 (L) 12/22/2020    LDLCALC 275 (H) 12/22/2020          HPI  C/o weakness of left le with ambulation - completed PT declined OT doesn't feel she needs it  C.o leakage and fullness in right ear - has ent appt scheduled  C.o left arm feels cold x past month since hospital discharge - unable to feel heat on left hand  C.o double vision right eye x past 2mos - feels a pulling with movement of the eye to the right - will schedule eye exam  Review of Systems   HENT: Positive for congestion, postnasal drip, rhinorrhea and sinus pressure. Negative for sinus pain. Eyes: Positive for visual disturbance. Respiratory: Negative. Cardiovascular: Negative. Gastrointestinal: Negative. Endocrine: Negative. Genitourinary: Negative. Musculoskeletal: Positive for back pain and gait problem. Neurological: Positive for dizziness and weakness. Hematological: Negative. Psychiatric/Behavioral: Negative. All other systems reviewed and are negative.         Past Medical History:   Diagnosis Date    Asthma     Depression     Endometriosis 03/2017    Hypothyroidism     Postmenopausal 2008    Type II diabetes mellitus, uncontrolled ([facility] [address]      Past Surgical History:   Procedure Laterality Date    DENTAL SURGERY      ENDOMETRIAL BIOPSY  03/2017    Alice lala    INNER EAR SURGERY Right 1993    ear perforation    ROTATOR CUFF REPAIR Left 12/12/2014    TONSILLECTOMY AND ADENOIDECTOMY      TYMPANOSTOMY TUBE PLACEMENT       Social History     Socioeconomic History    Marital status:      Spouse name: Not on file    Number of children: Not on file    Years of education: Not on file    Highest education level: Not on file   Occupational History    Not on file   Social Needs    Financial resource strain: Not on file    Food insecurity     Worry: Not on file     Inability: Not on file   [employer] needs     Medical: Not on file     Non-medical: Not on file   Tobacco Use    Smoking status: Never Smoker    Smokeless tobacco: Never Used   Substance and Sexual Activity    Alcohol use: No    Drug use: No    Sexual activity: Yes   Lifestyle    Physical activity     Days per week: Not on file     Minutes per session: Not on file    Stress: Not on file   Relationships    Social connections     Talks on phone: Not on file     Gets together: Not on file     Attends Latter-day service: Not on file     Active member of club or organization: Not on file     Attends meetings of clubs or organizations: Not on file     Relationship status: Not on file    Intimate partner violence     Fear of current or ex partner: Not on file     Emotionally abused: Not on file     Physically abused: Not on file Forced sexual activity: Not on file   Other Topics Concern    Not on file   Social History Narrative    Not on file     Family History   Problem Relation Age of Onset    Depression Mother     Diabetes Maternal Aunt     Diabetes Maternal Uncle     Asthma Maternal Grandmother     Cancer Maternal Grandmother         lung    High Blood Pressure Maternal Grandmother     High Blood Pressure Maternal Grandfather     Stroke Maternal Grandfather      No Known Allergies     Current Outpatient Medications   Medication Sig Dispense Refill    meclizine (ANTIVERT) 25 MG tablet Take 1 tablet by mouth 3 times daily for 10 days 30 tablet 1    lisinopril (PRINIVIL;ZESTRIL) 20 MG tablet Take 1 tablet by mouth daily 30 tablet 5    loratadine (CLARITIN) 10 MG tablet Take 1 tablet by mouth daily 30 tablet 3    levothyroxine (SYNTHROID) 200 MCG tablet One PO a day 30 tablet 1    fluticasone (FLONASE) 50 MCG/ACT nasal spray 1 spray by Each Nostril route daily 1 Bottle 0    ibuprofen (ADVIL;MOTRIN) 800 MG tablet TAKE 1 TABLET BY MOUTH EVERY 6 HOURS AS NEEDED FOR PAIN 120 tablet 1    simvastatin (ZOCOR) 80 MG tablet TAKE ONE TABLET BY MOUTH ONCE DAILY IN THE EVENING 30 tablet 3    glucose monitoring kit (FREESTYLE) monitoring kit 1 kit by Does not apply route daily 1 kit 0    blood glucose test strips (ASCENSIA AUTODISC VI;ONE TOUCH ULTRA TEST VI) strip Test BID. DX E11.8 NIDDM 100 strip 6    EQ ALLERGY RELIEF 10 MG tablet TAKE 1 TABLET BY MOUTH ONCE DAILY 30 tablet 2    fluticasone (FLONASE) 50 MCG/ACT nasal spray USE 1 SPRAY(S) IN EACH NOSTRIL ONCE DAILY 1 Bottle 3    Lancets MISC Test BID. DX E11.8 NIDDM 100 each 3    desoximetasone (TOPICORT) 0.05 % OINT oitment Apply to affected area twice daily as needed 45 g 1    Skin Protectants, Misc.  (HYDROCERIN) CREA cream Apply topically 2 times daily 1 Container 3  glucose monitoring kit (FREESTYLE) monitoring kit 1 kit by Does not apply route daily DX E11.8 NIDDM 1 kit 0    nabumetone (RELAFEN) 500 MG tablet Take 1 tablet by mouth 2 times daily 60 tablet 3     No current facility-administered medications for this visit. Objective    There were no vitals filed for this visit. Physical Exam  Constitutional:       Appearance: She is obese. HENT:      Head: Normocephalic and atraumatic. Ears:      Comments: Middle ear effusion right questionable perforation slightly erythematous  Eyes:      Extraocular Movements: Extraocular movements intact. Conjunctiva/sclera: Conjunctivae normal.      Pupils: Pupils are equal, round, and reactive to light. Comments: Nystagmus bilaterally worsens with lateral gaze   Neck:      Musculoskeletal: Normal range of motion and neck supple. Cardiovascular:      Rate and Rhythm: Normal rate and regular rhythm. Pulmonary:      Effort: Pulmonary effort is normal. No respiratory distress. Breath sounds: Normal breath sounds. No wheezing. Abdominal:      General: Bowel sounds are normal.      Tenderness: There is no abdominal tenderness. Musculoskeletal: Normal range of motion. Right lower leg: No edema. Left lower leg: No edema. Skin:     General: Skin is warm and dry. Coloration: Skin is not jaundiced or pale. Neurological:      Mental Status: She is alert and oriented to person, place, and time. Motor: No weakness. Comments: Exam from wheelchair  Motor strength plus 5 out of 5 all extremities   Psychiatric:         Mood and Affect: Mood normal.         Thought Content: Thought content normal.         Judgment: Judgment normal.                 Assessment & Plan    Diagnosis Orders   1. Type 2 diabetes mellitus without complication, without long-term current use of insulin ([facility])  POCT glycosylated hemoglobin (Hb A1C)    Comprehensive Metabolic Panel   2.  Otalgia of right ear 3. Sensation of feeling cold  XR CERVICAL SPINE (4-5 VIEWS)   4. Acute midline low back pain with left-sided sciatica  XR THORACIC SPINE (2 VIEWS)    XR LUMBAR SPINE (MIN 4 VIEWS)    Ambulatory referral to Physical Therapy   5. Acquired hypothyroidism  TSH with Reflex   6. Anemia, unspecified type  CBC Auto Differential    Iron And Tibc   7. Itchy skin  Allergen, Region 5 Respiratory Panel         Orders Placed This Encounter   Procedures    XR CERVICAL SPINE (4-5 VIEWS)     Standing Status:   Future     Standing Expiration Date:   2/4/2022     Scheduling Instructions:      Left arm     Order Specific Question:   Reason for exam:     Answer:   pain    XR THORACIC SPINE (2 VIEWS)     Standing Status:   Future     Standing Expiration Date:   2/4/2022     Order Specific Question:   Reason for exam:     Answer:   pain    XR LUMBAR SPINE (MIN 4 VIEWS)     Standing Status:   Future     Standing Expiration Date:   2/4/2022     Order Specific Question:   Reason for exam:     Answer:   pain    Comprehensive Metabolic Panel     Standing Status:   Future     Number of Occurrences:   1     Standing Expiration Date:   2/4/2022    TSH with Reflex     Standing Status:   Future     Number of Occurrences:   1     Standing Expiration Date:   2/4/2022    CBC Auto Differential     Standing Status:   Future     Number of Occurrences:   1     Standing Expiration Date:   2/4/2022    Iron And Tibc     Standing Status:   Future     Number of Occurrences:   1     Standing Expiration Date:   2/4/2022     Order Specific Question:   Is Patient Fasting? Answer:   y     Order Specific Question:   No of Hours?      Answer:   8    Allergen, Region 5 Respiratory Panel     Standing Status:   Future     Standing Expiration Date:   2/4/2022    Ambulatory referral to Physical Therapy     Referral Priority:   Routine     Referral Type:   Eval and Treat     Referral Reason:   Specialty Services Required Requested Specialty:   Physical Therapy     Number of Visits Requested:   1    POCT glycosylated hemoglobin (Hb A1C)     Orders Placed This Encounter   Medications    amoxicillin-clavulanate (AUGMENTIN) 875-125 MG per tablet     Sig: Take 1 tablet by mouth 2 times daily for 10 days     Dispense:  20 tablet     Refill:  0     There are no discontinued medications. Return in about 2 months (around 4/4/2021) for follow up on response to physical therapy.   Anika Stroud PA-C

## 2021-02-05 ASSESSMENT — ENCOUNTER SYMPTOMS
BACK PAIN: 1
SINUS PRESSURE: 1
GASTROINTESTINAL NEGATIVE: 1
RESPIRATORY NEGATIVE: 1
SINUS PAIN: 0
RHINORRHEA: 1

## 2021-03-08 ENCOUNTER — HOSPITAL ENCOUNTER (OUTPATIENT)
Dept: PHYSICAL THERAPY | Age: 46
Setting detail: THERAPIES SERIES
Discharge: HOME | End: 2021-03-08
Payer: COMMERCIAL

## 2021-03-08 PROCEDURE — 97161 PT EVAL LOW COMPLEX 20 MIN: CPT

## 2021-03-08 PROCEDURE — 97110 THERAPEUTIC EXERCISES: CPT

## 2021-03-08 ASSESSMENT — PAIN DESCRIPTION - FREQUENCY: FREQUENCY: CONTINUOUS

## 2021-03-08 ASSESSMENT — PAIN DESCRIPTION - LOCATION: LOCATION: BACK

## 2021-03-08 ASSESSMENT — PAIN DESCRIPTION - DESCRIPTORS: DESCRIPTORS: SHOOTING;ACHING

## 2021-03-08 ASSESSMENT — PAIN DESCRIPTION - ORIENTATION: ORIENTATION: LOWER

## 2021-03-08 NOTE — PROGRESS NOTES
[address]     Ph: [phone]  Fax: [phone]    [] Certification  [] Recertification [x]  Plan of Care  [] Progress Note [] Discharge      To:  Anika Stroud PA-C      From:  Claudell Bottom, PT, DPT  Patient: Lalo Cerda     : 1975  Diagnosis: Acute midline LBP with left-sided sciatica     Date: 3/8/2021  Treatment Diagnosis: LBP, impaired gait and mobility       Progress Report Period from:  3/8/2021  to 3/8/2021    Total # of Visits to Date: 1   No Show: 0    Canceled Appointment: 0     OBJECTIVE:   Short Term Goals - Time Frame for Short term goals: 3 weeks    Goals Current/Discharge status  Met   Short term goal 1: lumbar AROM at least 75% WNL in all planes w/o increased pain  Rigid trunk; mobility exercise to be addressed; reports hx if scoliosis, displays R ribcage elevation when prone   [] yes  [] no     Long Term Goals - Time Frame for Long term goals : 4-6 weeks  Goals Current/ Discharge status Met   Long term goal 1: Pt will be independent and compliant with HEP HEP to be progress to pt tolerance   [] yes  [] no   Long term goal 2: Pt will be able to complete 6 minute walk test with LRAD/no AD without radicular symptoms or LBP >2/10 Ambulating with standard walker; slow wing, multiple deviations [] yes  [] no   Long term goal 3: Pt will demo no fall risk w/o AD as measured by DGI score >22/24 DGI to be assessed as appropriate [] yes  [] no   Long term goal 4: Pt will improve LE and core strength to at least 4+/5 to improve mobility and daily activity tolerance Strength RLE  Comment: Grossly 4/5 throughout RLE  Strength LLE  Comment: Grossly 4- to 4/5 throughout LLE        Strength Other  Other: Can reciprocally ambulate up 6\" steps   [] yes  [] no   Long term goal 5: Pt will improve Mod Oswestry score < 8/50 or self report >90% PLOF Exam: Mod Oswestry: 15/50; self reports 50%
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
## 2021-03-15 ENCOUNTER — HOSPITAL ENCOUNTER (OUTPATIENT)
Dept: PHYSICAL THERAPY | Age: 46
Setting detail: THERAPIES SERIES
Discharge: HOME | End: 2021-03-15
Payer: COMMERCIAL

## 2021-03-15 PROCEDURE — 97110 THERAPEUTIC EXERCISES: CPT

## 2021-03-15 ASSESSMENT — PAIN SCALES - GENERAL: PAINLEVEL_OUTOF10: 7

## 2021-03-15 NOTE — PROGRESS NOTES
[id number] [address]  Outpatient Physical Therapy    Treatment Note        Date: 3/15/2021  Patient: Yanni Henry  : 1975  ACCT #: [de-identified]  Referring Practitioner: Ana Hameed PA-C  Diagnosis: Acute midline LBP with left-sided sciatica    Visit Information:  PT Visit Information  Onset Date: 21  [facility] [place]  Total # of Visits Approved: 30  Total # of Visits to Date: 2  No Show: 0  Canceled Appointment: 0  Progress Note Counter: -    Subjective: pain in LLE is 7/10, pain in back is alittle less     HEP Compliance:  [x] Good [] Fair [] Poor [] Reports not doing due to:    Vital Signs  Patient Currently in Pain: Yes   Pain Screening  Patient Currently in Pain: Yes  Pain Assessment  Pain Level: 7  Pain Type: Chronic pain  Pain Location: Back;Leg  Pain Orientation: Left  Pain Descriptors: Pressure    OBJECTIVE:   Exercises  Exercise 1: H/L PPTs x 15  Exercise 2: H/L LTRs 10 sec x 10  Exercise 4: H/L hip adduction ball squeezes: 5\" x 10  Exercise 5: Nu-Step, L-2, 5 min  Exercise 6: 4-way SLR x 10, b/l, YTB  Exercise 7: bridges 3 sec x 10  Exercise 8: clams x 15, b/l  Exercise 9: [employer] L-7, squats and ecc heel lowering x 15 ea  Exercise 11: SLS's 20 sec x 3, b/l  Exercise 12: fig-4 stretch 20 sec x 3, b/l, supine  Exercise 13: knee to opp shoulder 20 sec x 3, b/l       Strength: [x] NT  [] MMT completed:      ROM: [x] NT  [] ROM measurements:   *Indicates exercise, modality, or manual techniques to be initiated when appropriate    Assessment:         Body structures, Functions, Activity limitations: Increased pain, Decreased functional mobility , Decreased balance, Decreased ADL status, Decreased high-level IADLs, Decreased strength, Decreased endurance  Assessment: initiated multiple stretches and exercises per POC, 2 finger hold used w/ SLS's  Treatment Diagnosis: LBP, impaired gait and mobility  Prognosis: Good       Goals:  Short term goals  Time Frame for Short term goals: 3 weeks  Short term goal 1: lumbar AROM at least 75% WNL in all planes w/o increased pain    Long term goals  Time Frame for Long term goals : 4-6 weeks  Long term goal 1: Pt will be independent and compliant with HEP  Long term goal 2: Pt will be able to complete 6 minute walk test with LRAD/no AD without radicular symptoms or LBP >2/10  Long term goal 3: Pt will demo no fall risk w/o AD as measured by DGI score >22/24  Long term goal 4: Pt will improve LE and core strength to at least 4+/5 to improve mobility and daily activity tolerance  Long term goal 5: Pt will improve Mod Oswestry score < 8/50 or self report >90% PLOF  Progress toward goals:ongoing    POST-PAIN       Pain Rating (0-10 pain scale):   5/10   Location and pain description same as pre-treatment unless indicated. Action: [] NA   [x] Perform HEP  [] Meds as prescribed  [] Modalities as prescribed   [] Call Physician     Frequency/Duration:  Plan  Times per week: 2  Plan weeks: 6  Current Treatment Recommendations: ROM, Strengthening, Balance Training, Functional Mobility Training, Gait Training, Manual Therapy - Soft Tissue Mobilization, Manual Therapy - Joint Manipulation, Home Exercise Program, Patient/Caregiver Education & Training, Modalities, Aquatics     Pt to continue current HEP. See objective section for any therapeutic exercise changes, additions or modifications this date.          PT Individual Minutes  Time In: 7150  Time Out: 7790  Minutes: 38  Timed Code Treatment Minutes: 38 Minutes  Procedure Minutes: 0     Timed Activity Minutes Units       Signature:  Electronically signed by Moiz Harden PTA on 3/15/21 at 2:19 PM EDT

## 2021-03-17 ENCOUNTER — HOSPITAL ENCOUNTER (OUTPATIENT)
Dept: PHYSICAL THERAPY | Age: 46
Setting detail: THERAPIES SERIES
Discharge: HOME | End: 2021-03-17
Payer: COMMERCIAL

## 2021-03-17 PROCEDURE — 97110 THERAPEUTIC EXERCISES: CPT

## 2021-03-17 ASSESSMENT — PAIN DESCRIPTION - LOCATION: LOCATION: BACK;LEG

## 2021-03-17 ASSESSMENT — PAIN DESCRIPTION - DESCRIPTORS: DESCRIPTORS: SORE

## 2021-03-17 NOTE — PROGRESS NOTES
[id number] [address]  Outpatient Physical Therapy    Treatment Note        Date: 3/17/2021  Patient: Ijeoma Anderson  : 1975  ACCT #: [de-identified]  Referring Practitioner: Mayra Jones PA-C  Diagnosis: Acute midline LBP with left-sided sciatica    Visit Information:  PT Visit Information  Onset Date: 21  PT Insurance Information: [employer]  Total # of Visits Approved: 30  Total # of Visits to Date: 3  No Show: 0  Canceled Appointment: 0  Progress Note Counter: 3/8-    Subjective: Pt reports 6/10 soreness Left back andf anterior thigh. HEP Compliance:  [x] Good [] Fair [] Poor [] Reports not doing due to:    Vital Signs  Patient Currently in Pain: Yes   Pain Screening  Patient Currently in Pain: Yes  Pain Assessment  Pain Level: 6  Pain Type: Chronic pain  Pain Location: Back;Leg  Pain Orientation: Left  Pain Descriptors: Sore    OBJECTIVE:   Exercises  Exercise 1: H/L PPTs x 15  Exercise 2: H/L LTRs 10 sec x 10  Exercise 3: Seated hip TRACEY/FADIR stretches 20 sec x3  Exercise 4: H/L hip adduction ball squeezes: 5\" x 15  Exercise 5: Nu-Step, L-3, 5 min  Exercise 6: 4-way SLR x 10, b/l, YTB  Exercise 7: bridges 5 sec x 15  Exercise 8: clams x 20, b/l  Exercise 9: Total Gym L-7, squats and ecc heel lowering x 20 ea  Exercise 11: SLS's 20 sec x 3, b/l 1 finger support  Exercise 12: fig-4 stretch 20 sec x 3, b/l, supine          Strength: [x] NT  [] MMT completed:     ROM: [x] NT  [] ROM measurements:     Modalities:  Modalities  Other: Pt declined     *Indicates exercise, modality, or manual techniques to be initiated when appropriate    Assessment: Body structures, Functions, Activity limitations: Increased pain, Decreased functional mobility , Decreased balance, Decreased ADL status, Decreased high-level IADLs, Decreased strength, Decreased endurance  Assessment: Progressed multiple exercises with focus on core posture .  Instructed to stop, if painful, asked multiple times of ok she responded yes. Post Tx pt reports increased pain in buttocks left. Not sure when it started. Offered CP/HP pt declined. Treatment Diagnosis: LBP, impaired gait and mobility          Goals:  Short term goals  Time Frame for Short term goals: 3 weeks  Short term goal 1: lumbar AROM at least 75% WNL in all planes w/o increased pain    Long term goals  Time Frame for Long term goals : 4-6 weeks  Long term goal 1: Pt will be independent and compliant with HEP  Long term goal 2: Pt will be able to complete 6 minute walk test with LRAD/no AD without radicular symptoms or LBP >2/10  Long term goal 3: Pt will demo no fall risk w/o AD as measured by DGI score >22/24  Long term goal 4: Pt will improve LE and core strength to at least 4+/5 to improve mobility and daily activity tolerance  Long term goal 5: Pt will improve Mod Oswestry score < 8/50 or self report >90% PLOF  Progress toward goals: Progressing towards goals. POST-PAIN       Pain Rating (0-10 pain scale):  5 /10   Location and pain description same as pre-treatment unless indicated. Action: [] NA   [x] Perform HEP  [] Meds as prescribed  [] Modalities as prescribed   [] Call Physician     Frequency/Duration:  Plan  Times per week: 2  Plan weeks: 6  Current Treatment Recommendations: ROM, Strengthening, Balance Training, Functional Mobility Training, Gait Training, Manual Therapy - Soft Tissue Mobilization, Manual Therapy - Joint Manipulation, Home Exercise Program, Patient/Caregiver Education & Training, Modalities, Aquatics     Pt to continue current HEP. See objective section for any therapeutic exercise changes, additions or modifications this date.          PT Individual Minutes  Time In: 3676  Time Out: 4037  Minutes: 47  Timed Code Treatment Minutes: 47 Minutes  Procedure Minutes: 0     Timed Activity Minutes Units   Ther Ex 47 3       Signature:  Electronically signed by Yahaira Turner PTA on 3/17/21 at 2:38 PM EDT

## 2021-03-22 ENCOUNTER — HOSPITAL ENCOUNTER (OUTPATIENT)
Dept: PHYSICAL THERAPY | Age: 46
Setting detail: THERAPIES SERIES
Discharge: HOME | End: 2021-03-22
Payer: COMMERCIAL

## 2021-03-22 NOTE — PROGRESS NOTES
[address]       Physical Therapy  Cancellation/No-show Note  Patient Name:  Ijeoma Anderson  :  1975   Date:  3/22/2021  Referring Practitioner: Mayra Jones PA-C  Diagnosis: Acute midline LBP with left-sided sciatica    Visit Information:  PT Visit Information  Onset Date: 21  PT Insurance Information: [employer]  Total # of Visits Approved: 30  Total # of Visits to Date: 3  No Show: 0  Canceled Appointment: 1  Progress Note Counter: 3/8- CX 3/22/21    For today's appointment patient:  [x]  Cancelled  []  Rescheduled appointment  []  No-show   []  Called pt to remind of next appointment     Reason given by patient:  [x]  Patient ill  []  Conflicting appointment  []  No transportation    []  Conflict with work  []  No reason given  []  Other:       Comments:       Signature: Electronically signed by Jg St PTA on 3/22/21 at 10:23 AM EDT

## 2021-03-29 ENCOUNTER — HOSPITAL ENCOUNTER (OUTPATIENT)
Dept: PHYSICAL THERAPY | Age: 46
Setting detail: THERAPIES SERIES
Discharge: HOME | End: 2021-03-29
Payer: COMMERCIAL

## 2021-03-29 NOTE — PROGRESS NOTES
[address]       Physical Therapy  Cancellation/No-show Note  Patient Name:  Elliott Green  :  1975   Date:  3/29/2021  Referring Practitioner: Kimo Walker PA-C  Diagnosis: Acute midline LBP with left-sided sciatica    Visit Information:  PT Visit Information  Onset Date: 21  PT Insurance Information: [employer]  Total # of Visits Approved: 30  Total # of Visits to Date: 3  No Show: 1  Canceled Appointment: 2  Progress Note Counter: 3/8 (cx 3/1192)    For today's appointment patient:  [x]  Cancelled  []  Rescheduled appointment  []  No-show   []  Called pt to remind of next appointment     Reason given by patient:  []  Patient ill  []  Conflicting appointment  [x]  No transportation    []  Conflict with work  []  No reason given  []  Other:       Comments:       Signature: Electronically signed by Ashlee Botello PTA on 3/29/21 at 10:06 AM EDT

## 2021-03-31 ENCOUNTER — HOSPITAL ENCOUNTER (OUTPATIENT)
Dept: PHYSICAL THERAPY | Age: 46
Setting detail: THERAPIES SERIES
Discharge: HOME | End: 2021-03-31
Payer: COMMERCIAL

## 2021-03-31 PROCEDURE — 97140 MANUAL THERAPY 1/> REGIONS: CPT

## 2021-03-31 PROCEDURE — 97110 THERAPEUTIC EXERCISES: CPT

## 2021-03-31 ASSESSMENT — PAIN DESCRIPTION - LOCATION: LOCATION: BACK;HIP

## 2021-03-31 ASSESSMENT — PAIN DESCRIPTION - ORIENTATION
ORIENTATION: LOWER;LEFT
ORIENTATION: LEFT

## 2021-03-31 NOTE — PROGRESS NOTES
[address] [address]  Outpatient Physical Therapy    Treatment Note        Date: 3/31/2021  Patient: Sarah Ward  : 1975  ACCT #: [de-identified]  Referring Practitioner: Mary Angelo PA-C  Diagnosis: Acute midline LBP with left-sided sciatica    Visit Information:  PT Visit Information  Onset Date: 21  [facility]  Total # of Visits Approved: 30  Total # of Visits to Date: 4  No Show: 1  Canceled Appointment: 2  Progress Note Counter:     Subjective: Pt reports 8/10 sharp LB and L hip. did too much the last 2 days. had sharp pain last night which rad down LLE. States going out of town and will not be here for Monday. HEP Compliance:  [x] Good [] Fair [] Poor [] Reports not doing due to:    Vital Signs  Patient Currently in Pain: Yes   Pain Screening  Patient Currently in Pain: Yes  Pain Assessment  Pain Level: 8  Pain Location: Back; Hip  Pain Orientation: Lower; Left  Pain Descriptors: Aching    OBJECTIVE:   Exercises  Exercise 1: H/L PPTs x 20  Exercise 2: H/L LTRs 10 sec x 10  Exercise 4: H/L hip adduction ball squeezes: 5\" x 20  Exercise 5: Nu-Step, L-4, 5 min  Exercise 7: bridges 5 sec x 20  Exercise 8: clams x 20, b/l  Exercise 12: fig-4 stretch 20 sec x 3, b/l, supine  Exercise 13: knee to opp shoulder 20 sec x 3, b/l         Strength: [x] NT  [] MMT completed:      ROM: [x] NT  [] ROM measurements:      Manual:   Manual therapy  PROM: LE stretches  Manual traction: leg pull distraction  Soft Tissue Mobalization: lumbar paraspinals 9 minutes total    Modalities:  Modalities  Moist heat: HP to LB to decrease tightness x 10 minutes. Cryotherapy (Minutes\Location): *  E-stim (parameters): *     *Indicates exercise, modality, or manual techniques to be initiated when appropriate    Assessment:         Body structures, Functions, Activity limitations: Increased pain, Decreased functional mobility , Decreased balance, Decreased ADL status, Decreased high-level IADLs, Decreased strength, Decreased endurance  Assessment: Held weightbearing exercises and added manual to decrease sx. Progressed supine exercises with good tolerance. Discussed tayloring activity to tolerance to manage sx. Concluded with HP to decrease tightness. Treatment Diagnosis: LBP, impaired gait and mobility          Goals:  Short term goals  Time Frame for Short term goals: 3 weeks  Short term goal 1: lumbar AROM at least 75% WNL in all planes w/o increased pain    Long term goals  Time Frame for Long term goals : 4-6 weeks  Long term goal 1: Pt will be independent and compliant with HEP  Long term goal 2: Pt will be able to complete 6 minute walk test with LRAD/no AD without radicular symptoms or LBP >2/10  Long term goal 3: Pt will demo no fall risk w/o AD as measured by DGI score >22/24  Long term goal 4: Pt will improve LE and core strength to at least 4+/5 to improve mobility and daily activity tolerance  Long term goal 5: Pt will improve Mod Oswestry score < 8/50 or self report >90% PLOF  Progress toward goals:Progressing towards goals. POST-PAIN       Pain Rating (0-10 pain scale):  0 /10   Location and pain description same as pre-treatment unless indicated. Action: [] NA   [x] Perform HEP  [] Meds as prescribed  [x] Modalities as prescribed   [] Call Physician     Frequency/Duration:  Plan  Times per week: 2  Plan weeks: 6  Current Treatment Recommendations: ROM, Strengthening, Balance Training, Functional Mobility Training, Gait Training, Manual Therapy - Soft Tissue Mobilization, Manual Therapy - Joint Manipulation, Home Exercise Program, Patient/Caregiver Education & Training, Modalities, Aquatics     Pt to continue current HEP. See objective section for any therapeutic exercise changes, additions or modifications this date.          PT Individual Minutes  Time In: 1340  Time Out: 1430  Minutes: 50  Timed Code Treatment Minutes: 39 Minutes  Procedure Minutes:10     Timed Activity Minutes Units   Ther Ex 30 2   Manual  9 1       Signature:  Electronically signed by Gerber Hammer PTA on 3/31/21 at 2:25 PM EDT

## 2021-04-05 ENCOUNTER — APPOINTMENT (OUTPATIENT)
Dept: PHYSICAL THERAPY | Age: 46
End: 2021-04-05
Payer: COMMERCIAL

## 2021-04-07 ENCOUNTER — HOSPITAL ENCOUNTER (OUTPATIENT)
Dept: PHYSICAL THERAPY | Age: 46
Setting detail: THERAPIES SERIES
Discharge: HOME | End: 2021-04-07
Payer: COMMERCIAL

## 2021-04-07 PROCEDURE — 97140 MANUAL THERAPY 1/> REGIONS: CPT

## 2021-04-07 PROCEDURE — 97110 THERAPEUTIC EXERCISES: CPT

## 2021-04-07 ASSESSMENT — PAIN DESCRIPTION - PAIN TYPE: TYPE: CHRONIC PAIN

## 2021-04-07 NOTE — PROGRESS NOTES
[address] [address]  Outpatient Physical Therapy    Treatment Note        Date: 2021  Patient: Kumar Red  : 1975  ACCT #: [de-identified]  Referring Practitioner: Juan Lara PA-C  Diagnosis: Acute midline LBP with left-sided sciatica    Visit Information:  PT Visit Information  Onset Date: 21  PT Insurance Information: Pam  Total # of Visits Approved: 30  Total # of Visits to Date: 5  No Show: 1  Canceled Appointment: 2  Progress Note Counter:     Subjective: Pt reports \"I'm Not myself today\" Reports 4/10 pain LB achy. HEP Compliance:  [] Good [x] Fair [] Poor [] Reports not doing due to:    Vital Signs  Patient Currently in Pain: Yes   Pain Screening  Patient Currently in Pain: Yes  Pain Assessment  Pain Level: 4  Pain Type: Chronic pain  Pain Location: Back; Hip  Pain Orientation: Lower; Left    OBJECTIVE:   Exercises  Exercise 1: H/L PPTs 5 sec x 20  Exercise 2: H/L LTRs 10 sec x 10  Exercise 4: H/L hip adduction ball squeezes/ ABd YTB : 5\" x 25  Exercise 7: bridges 5 sec x 20  Exercise 8: clams x 25, b/l  Exercise 12: fig-4 stretch 20 sec x 3, b/l, supine  Exercise 13: knee to opp shoulder 10 sec x10, b/l  Exercise 14: Abdominal walkouts. x 10         Strength: [x] NT  [] MMT completed:        ROM: [x] NT  [] ROM measurements:         Manual:   Manual therapy  PROM: LE stretches  Manual traction: leg pull distraction  Soft Tissue Mobalization: lumbar paraspinals 11 minutes total    Modalities:  Modalities  Moist heat: HP to LB to decrease tightness x 10 minutes. Cryotherapy (Minutes\Location): *  E-stim (parameters): *     *Indicates exercise, modality, or manual techniques to be initiated when appropriate    Assessment:         Body structures, Functions, Activity limitations: Increased pain, Decreased functional mobility , Decreased balance, Decreased ADL status, Decreased high-level IADLs, Decreased strength, Decreased endurance  Assessment: Continue to progress current exercises and added Abdominal walkouts and H/L abd to improve LE and core strength. Focus of exercises supine this session. Will adavnce to weightbearing NV if sx decrease. Good tolerance to treatment. concluded with HP to decrease tightness. Treatment Diagnosis: LBP, impaired gait and mobility          Goals:  Short term goals  Time Frame for Short term goals: 3 weeks  Short term goal 1: lumbar AROM at least 75% WNL in all planes w/o increased pain    Long term goals  Time Frame for Long term goals : 4-6 weeks  Long term goal 1: Pt will be independent and compliant with HEP  Long term goal 2: Pt will be able to complete 6 minute walk test with LRAD/no AD without radicular symptoms or LBP >2/10  Long term goal 3: Pt will demo no fall risk w/o AD as measured by DGI score >22/24  Long term goal 4: Pt will improve LE and core strength to at least 4+/5 to improve mobility and daily activity tolerance  Long term goal 5: Pt will improve Mod Oswestry score < 8/50 or self report >90% PLOF  Progress toward goals: Progressing  Towards goals. POST-PAIN       Pain Rating (0-10 pain scale):  3 /10   Location and pain description same as pre-treatment unless indicated. Action: [] NA   [x] Perform HEP  [] Meds as prescribed  [x] Modalities as prescribed   [] Call Physician     Frequency/Duration:  Plan  Times per week: 2  Plan weeks: 6  Current Treatment Recommendations: ROM, Strengthening, Balance Training, Functional Mobility Training, Gait Training, Manual Therapy - Soft Tissue Mobilization, Manual Therapy - Joint Manipulation, Home Exercise Program, Patient/Caregiver Education & Training, Modalities, Aquatics     Pt to continue current HEP. See objective section for any therapeutic exercise changes, additions or modifications this date.          PT Individual Minutes  Time In: 1340  Time Out: 0219  Minutes: 53  Timed Code Treatment Minutes: 41 Minutes  Procedure Minutes: 10 HP     Timed Activity Minutes Units Ther Ex 30 2   Manual  11 1       Signature:  Electronically signed by Parth Resendiz PTA on 4/7/21 at 2:35 PM EDT

## 2021-04-08 DIAGNOSIS — E03.9 ACQUIRED HYPOTHYROIDISM: ICD-10-CM

## 2021-04-08 RX ORDER — LEVOTHYROXINE SODIUM 0.2 MG/1
TABLET ORAL
Qty: 30 TABLET | Refills: 5 | Status: SHIPPED | OUTPATIENT
Start: 2021-04-08 | End: 2022-04-19

## 2021-04-14 ENCOUNTER — VIRTUAL VISIT (OUTPATIENT)
Dept: FAMILY MEDICINE CLINIC | Age: 46
End: 2021-04-14
Payer: COMMERCIAL

## 2021-04-14 DIAGNOSIS — M54.9 ACUTE MIDLINE BACK PAIN, UNSPECIFIED BACK LOCATION: ICD-10-CM

## 2021-04-14 PROCEDURE — G8417 CALC BMI ABV UP PARAM F/U: HCPCS | Performed by: PHYSICIAN ASSISTANT

## 2021-04-14 PROCEDURE — 99212 OFFICE O/P EST SF 10 MIN: CPT | Performed by: PHYSICIAN ASSISTANT

## 2021-04-14 PROCEDURE — 1036F TOBACCO NON-USER: CPT | Performed by: PHYSICIAN ASSISTANT

## 2021-04-14 PROCEDURE — G8427 DOCREV CUR MEDS BY ELIG CLIN: HCPCS | Performed by: PHYSICIAN ASSISTANT

## 2021-04-14 RX ORDER — MECLIZINE HYDROCHLORIDE 25 MG/1
TABLET ORAL
COMMUNITY
Start: 2021-03-05

## 2021-04-14 RX ORDER — IBUPROFEN 800 MG/1
800 TABLET ORAL EVERY 6 HOURS PRN
Qty: 120 TABLET | Refills: 1 | Status: SHIPPED | OUTPATIENT
Start: 2021-04-14

## 2021-04-14 SDOH — ECONOMIC STABILITY: INCOME INSECURITY: HOW HARD IS IT FOR YOU TO PAY FOR THE VERY BASICS LIKE FOOD, HOUSING, MEDICAL CARE, AND HEATING?: VERY HARD

## 2021-04-14 SDOH — ECONOMIC STABILITY: TRANSPORTATION INSECURITY
IN THE PAST 12 MONTHS, HAS LACK OF TRANSPORTATION KEPT YOU FROM MEETINGS, WORK, OR FROM GETTING THINGS NEEDED FOR DAILY LIVING?: NO

## 2021-04-14 SDOH — ECONOMIC STABILITY: TRANSPORTATION INSECURITY
IN THE PAST 12 MONTHS, HAS THE LACK OF TRANSPORTATION KEPT YOU FROM MEDICAL APPOINTMENTS OR FROM GETTING MEDICATIONS?: NO

## 2021-04-14 ASSESSMENT — ENCOUNTER SYMPTOMS
SINUS PAIN: 1
BACK PAIN: 1
SINUS PRESSURE: 1

## 2021-04-14 NOTE — PROGRESS NOTES
Felicity Dougherty 9, 50 y.o. female presents today with:  Chief Complaint   Patient presents with    Diabetes     pt is following up on DM    Back Pain     pt is having low back pain 3/10-10/10. tylenol with no relief. Treatment Adherence:   Medication compliance:  compliant all of the time  Diet compliance:  compliant all of the time  Weight trend: stable  Current exercise: walks 3 time(s) per week      Diabetes Mellitus Type 2: Current symptoms/problems include none. Home blood sugar records:  fasting range: 100  Any episodes of hypoglycemia? no  Eye exam current (within one year): yes  Tobacco history: She  reports that she has never smoked. She has never used smokeless tobacco.   Daily Aspirin? No:   Known diabetic complications: none        Lab Results   Component Value Date    LABA1C 5.8 02/04/2021    LABA1C 6.8 (H) 12/21/2020    LABA1C 6.7 08/13/2020     Lab Results   Component Value Date    LABMICR 2.40 (H) 10/25/2016    CREATININE 0.76 02/04/2021     Lab Results   Component Value Date    ALT 29 02/04/2021    AST 26 02/04/2021     Lab Results   Component Value Date    CHOL 343 (H) 12/22/2020    TRIG 185 (H) 12/22/2020    HDL 31 (L) 12/22/2020    LDLCALC 275 (H) 12/22/2020          HPI  Telemedicine telephone visit due to concern for exposure to COVID-19 patient visit for follow-up on chronic low back pain with radiation to her left leg. Grades pain 3 out of 10-10 out of 10 at times depending on activity. States she is able to climb steps but has difficulty with extended walking and needs to use of her cane or walker to do so. 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 She informed me that she  ended her employment with her employer d.t her pain and is not eligible for SSI    Review of Systems   HENT: Positive for sinus pressure and sinus pain. Musculoskeletal: Positive for back pain. Neurological: Positive for dizziness.          Past Medical History:   Diagnosis Date    Asthma     Depression     Endometriosis 03/2017    Hypothyroidism     Postmenopausal 2008    Type II diabetes mellitus, uncontrolled ([facility] [address]      Past Surgical History:   Procedure Laterality Date    DENTAL SURGERY      ENDOMETRIAL BIOPSY  03/2017    Alice lala    INNER EAR SURGERY Right 1993    ear perforation    ROTATOR CUFF REPAIR Left 12/12/2014    TONSILLECTOMY AND ADENOIDECTOMY      TYMPANOSTOMY TUBE PLACEMENT       Social History     Socioeconomic History    Marital status:      Spouse name: Not on file    Number of children: Not on file    Years of education: Not on file    Highest education level: Not on file   Occupational History    Not on file   Social Needs    Financial resource strain: Very hard    Food insecurity     Worry: Often true     Inability: Often true    Transportation needs     Medical: No     Non-medical: No   Tobacco Use    Smoking status: Never Smoker    Smokeless tobacco: Never Used   Substance and Sexual Activity    Alcohol use: No    Drug use: No    Sexual activity: Yes   Lifestyle    Physical activity     Days per week: Not on file     Minutes per session: Not on file    Stress: Not on file   Relationships    Social connections     Talks on phone: Not on file     Gets together: Not on file     Attends Yarsanism service: Not on file     Active member of club or organization: Not on file     Attends meetings of clubs or organizations: Not on file     Relationship status: Not on file    Intimate partner violence     Fear of current or ex partner: Not on file     Emotionally abused: Not on file     Physically abused: Not on file     Forced sexual activity: Not on file   Other Topics Concern    Not on file   Social History Narrative    Not on file     Family History   Problem Relation Age of Onset    Depression Mother    Jose Antoniona Diabetes Maternal Aunt     Diabetes Maternal Uncle     Asthma Maternal Grandmother     Cancer Maternal Grandmother         lung    High Blood Pressure Maternal Grandmother     High Blood Pressure Maternal Grandfather     Stroke Maternal Grandfather      No Known Allergies     Current Outpatient Medications   Medication Sig Dispense Refill    meclizine (ANTIVERT) 25 MG tablet TAKE 1 TABLET BY MOUTH THREE TIMES DAILY FOR 10 DAYS      ibuprofen (ADVIL;MOTRIN) 800 MG tablet Take 1 tablet by mouth every 6 hours as needed for Pain 120 tablet 1    levothyroxine (SYNTHROID) 200 MCG tablet One PO a day 30 tablet 5    lisinopril (PRINIVIL;ZESTRIL) 20 MG tablet Take 1 tablet by mouth daily 30 tablet 5    loratadine (CLARITIN) 10 MG tablet Take 1 tablet by mouth daily 30 tablet 3    fluticasone (FLONASE) 50 MCG/ACT nasal spray 1 spray by Each Nostril route daily 1 Bottle 0    glucose monitoring kit (FREESTYLE) monitoring kit 1 kit by Does not apply route daily 1 kit 0    blood glucose test strips (ASCENSIA AUTODISC VI;ONE TOUCH ULTRA TEST VI) strip Test BID. DX E11.8 NIDDM 100 strip 6    EQ ALLERGY RELIEF 10 MG tablet TAKE 1 TABLET BY MOUTH ONCE DAILY 30 tablet 2    fluticasone (FLONASE) 50 MCG/ACT nasal spray USE 1 SPRAY(S) IN EACH NOSTRIL ONCE DAILY 1 Bottle 3    Lancets MISC Test BID. DX E11.8 NIDDM 100 each 3    glucose monitoring kit (FREESTYLE) monitoring kit 1 kit by Does not apply route daily DX E11.8 NIDDM 1 kit 0    simvastatin (ZOCOR) 80 MG tablet TAKE ONE TABLET BY MOUTH ONCE DAILY IN THE EVENING (Patient not taking: Reported on 4/14/2021) 30 tablet 3    desoximetasone (TOPICORT) 0.05 % OINT oitment Apply to affected area twice daily as needed (Patient not taking: Reported on 4/14/2021) 45 g 1    Skin Protectants, Misc.  (HYDROCERIN) CREA cream Apply topically 2 times daily (Patient not taking: Reported on 4/14/2021) 1 Container 3    nabumetone (RELAFEN) 500 MG tablet Take 1 tablet by mouth 2 times daily (Patient not taking: Reported on 4/14/2021) 60 tablet 3     No current facility-administered medications for this visit. Objective    There were no vitals filed for this visit. Physical Exam  Neck:      Thyroid: No thyromegaly. Pulmonary:      Effort: Pulmonary effort is normal. No respiratory distress. Breath sounds: No wheezing. Skin:     Coloration: Skin is not pale. Neurological:      Mental Status: She is oriented to person, place, and time. Cranial Nerves: No cranial nerve deficit. Motor: No weakness. Coordination: Coordination normal.      Gait: Gait normal.   Psychiatric:         Mood and Affect: Mood normal.         Behavior: Behavior normal.         Thought Content: Thought content normal.         Judgment: Judgment normal.              Assessment & Plan    Diagnosis Orders   1. Acute midline back pain, unspecified back location  ibuprofen (ADVIL;MOTRIN) 800 MG tablet    Ambulatory referral to Pain Clinic         Orders Placed This Encounter   Procedures    Ambulatory referral to Pain Clinic     Referral Priority:   Routine     Referral Type:   Eval and Treat     Referral Reason:   Specialty Services Required     Requested Specialty:   Pain Management     Number of Visits Requested:   1     Orders Placed This Encounter   Medications    ibuprofen (ADVIL;MOTRIN) 800 MG tablet     Sig: Take 1 tablet by mouth every 6 hours as needed for Pain     Dispense:  120 tablet     Refill:  1     Medications Discontinued During This Encounter   Medication Reason    ibuprofen (ADVIL;MOTRIN) 800 MG tablet REORDER     Return if symptoms worsen or fail to improve, for follow up on response to physical therapy.     Anika Stroud PA-C

## 2021-04-15 ENCOUNTER — OFFICE VISIT (OUTPATIENT)
Dept: NEUROLOGY | Age: 46
End: 2021-04-15
Payer: COMMERCIAL

## 2021-04-15 VITALS
DIASTOLIC BLOOD PRESSURE: 86 MMHG | BODY MASS INDEX: 41.66 KG/M2 | SYSTOLIC BLOOD PRESSURE: 132 MMHG | HEART RATE: 67 BPM | WEIGHT: 192.5 LBS

## 2021-04-15 DIAGNOSIS — M54.16 LUMBAR RADICULOPATHY: ICD-10-CM

## 2021-04-15 DIAGNOSIS — G43.109 COMPLICATED MIGRAINE: ICD-10-CM

## 2021-04-15 DIAGNOSIS — R42 DIZZINESS: ICD-10-CM

## 2021-04-15 DIAGNOSIS — G43.809 MIGRAINE VARIANT: Primary | ICD-10-CM

## 2021-04-15 DIAGNOSIS — H81.13 BENIGN PAROXYSMAL POSITIONAL VERTIGO DUE TO BILATERAL VESTIBULAR DISORDER: ICD-10-CM

## 2021-04-15 DIAGNOSIS — H55.01 NYSTAGMUS, CONGENITAL: ICD-10-CM

## 2021-04-15 PROCEDURE — G8417 CALC BMI ABV UP PARAM F/U: HCPCS | Performed by: PSYCHIATRY & NEUROLOGY

## 2021-04-15 PROCEDURE — 1036F TOBACCO NON-USER: CPT | Performed by: PSYCHIATRY & NEUROLOGY

## 2021-04-15 PROCEDURE — G8427 DOCREV CUR MEDS BY ELIG CLIN: HCPCS | Performed by: PSYCHIATRY & NEUROLOGY

## 2021-04-15 PROCEDURE — 99215 OFFICE O/P EST HI 40 MIN: CPT | Performed by: PSYCHIATRY & NEUROLOGY

## 2021-04-15 RX ORDER — SUMATRIPTAN 100 MG/1
100 TABLET, FILM COATED ORAL
Qty: 9 TABLET | Refills: 3 | Status: SHIPPED | OUTPATIENT
Start: 2021-04-15 | End: 2022-04-27 | Stop reason: SDUPTHER

## 2021-04-15 ASSESSMENT — ENCOUNTER SYMPTOMS
SHORTNESS OF BREATH: 0
CHOKING: 0
PHOTOPHOBIA: 0
TROUBLE SWALLOWING: 0
BACK PAIN: 1
VOMITING: 0
COLOR CHANGE: 0
NAUSEA: 0

## 2021-04-15 NOTE — PROGRESS NOTES
Subjective:      Patient ID: Chacho Beckett is a 50 y.o. female who presents today for:  Chief Complaint   Patient presents with    New Patient     Pt states that she is getting migraines that are occuring more frequently she has been to eye doctor and those are okay. Migraines about 1 time a week.  Back Pain     Pt states that she feels she has a pinched never for about 4-5 years now. She walks with a walker since december.  Other     back in december she was in hosp for about a week for stroke like symptoms and she was not doing well. Dizziness, slurred speech and not able to walk right. HPI 50 right-handed female who we had seen in the hospital in December for a bifrontal headache. She had vertigo at the same time. She woke up with a headache and felt drunk. She has considerable stress at that time. 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 We will start her with hydroxyzine. Recommended Imitrex for the same. Patient is having lumbar pain and has been going on for some time. She is inpatient hospital records reviewed. He has at least 1 headache a week. This is associated nausea and some photophobia. Patient has congenital nystagmus. Her main issues appears to be back pain she is battling with this for at least 3 to 4 years and she has not had imaging studies she has had physical therapy in the past and she has second request to do this.   The pain radiates down on the right more than left and is causing disability  Past Medical History:   Diagnosis Date    Asthma     Depression     Endometriosis 03/2017    Hypothyroidism     Postmenopausal 2008    Type II diabetes mellitus, uncontrolled ([facility] [address]      Past Surgical History:   Procedure Laterality Date    DENTAL SURGERY      ENDOMETRIAL BIOPSY  03/2017    Alice lala    INNER EAR SURGERY Right 1993    ear perforation    ROTATOR CUFF REPAIR Left 12/12/2014    TONSILLECTOMY AND ADENOIDECTOMY      TYMPANOSTOMY TUBE PLACEMENT       Social History     Socioeconomic History    Marital status:      Spouse name: Not on file    Number of children: Not on file    Years of education: Not on file    Highest education level: Not on file   Occupational History    Not on file   Social Needs    Financial resource strain: Very hard    Food insecurity     Worry: Often true     Inability: Often true    Transportation needs     Medical: No     Non-medical: No   Tobacco Use    Smoking status: Never Smoker    Smokeless tobacco: Never Used   Substance and Sexual Activity    Alcohol use: No    Drug use: No    Sexual activity: Yes   Lifestyle    Physical activity     Days per week: Not on file     Minutes per session: Not on file    Stress: Not on file   Relationships    Social connections     Talks on phone: Not on file     Gets together: Not on file     Attends Yarsani service: Not on file     Active member of club or organization: Not on file     Attends meetings of clubs or organizations: Not on file     Relationship status: Not on file    Intimate partner violence     Fear of current or ex partner: Not on file     Emotionally abused: Not on file     Physically abused: Not on file     Forced sexual activity: Not on file   Other Topics Concern    Not on file   Social History Narrative    Not on file     Family History   Problem Relation Age of Onset    Depression Mother     Diabetes Maternal Aunt     Diabetes Maternal Uncle     Asthma Maternal Grandmother     Cancer Maternal Grandmother         lung    High Blood Pressure Maternal Grandmother     High Blood Pressure Maternal Grandfather     Stroke Maternal Grandfather      No Known Allergies    Current Outpatient Medications   Medication Sig Dispense Refill    meclizine (ANTIVERT) 25 MG tablet TAKE 1 TABLET BY MOUTH THREE TIMES DAILY FOR 10 DAYS      ibuprofen (ADVIL;MOTRIN) 800 MG tablet Take 1 tablet by mouth every 6 hours as needed for Pain 120 tablet 1    levothyroxine (SYNTHROID) 200 MCG tablet One PO a day 30 tablet 5    lisinopril (PRINIVIL;ZESTRIL) 20 MG tablet Take 1 tablet by mouth daily 30 tablet 5    loratadine (CLARITIN) 10 MG tablet Take 1 tablet by mouth daily 30 tablet 3    fluticasone (FLONASE) 50 MCG/ACT nasal spray 1 spray by Each Nostril route daily 1 Bottle 0    glucose monitoring kit (FREESTYLE) monitoring kit 1 kit by Does not apply route daily 1 kit 0    blood glucose test strips (ASCENSIA AUTODISC VI;ONE TOUCH ULTRA TEST VI) strip Test BID. DX E11.8 NIDDM 100 strip 6    EQ ALLERGY RELIEF 10 MG tablet TAKE 1 TABLET BY MOUTH ONCE DAILY 30 tablet 2    fluticasone (FLONASE) 50 MCG/ACT nasal spray USE 1 SPRAY(S) IN EACH NOSTRIL ONCE DAILY 1 Bottle 3    Lancets MISC Test BID. DX E11.8 NIDDM 100 each 3    glucose monitoring kit (FREESTYLE) monitoring kit 1 kit by Does not apply route daily DX E11.8 NIDDM 1 kit 0    nabumetone (RELAFEN) 500 MG tablet Take 1 tablet by mouth 2 times daily 60 tablet 3    simvastatin (ZOCOR) 80 MG tablet TAKE ONE TABLET BY MOUTH ONCE DAILY IN THE EVENING (Patient not taking: Reported on 4/14/2021) 30 tablet 3    desoximetasone (TOPICORT) 0.05 % OINT oitment Apply to affected area twice daily as needed (Patient not taking: Reported on 4/14/2021) 45 g 1    Skin Protectants, Misc. (HYDROCERIN) CREA cream Apply topically 2 times daily (Patient not taking: Reported on 4/14/2021) 1 Container 3     No current facility-administered medications for this visit. Review of Systems   Constitutional: Negative for fever. HENT: Negative for ear pain, tinnitus and trouble swallowing. Eyes: Negative for photophobia and visual disturbance. Respiratory: Negative for choking and shortness of breath. Cardiovascular: Negative for chest pain and palpitations. Gastrointestinal: Negative for nausea and vomiting.    Musculoskeletal: Positive for back pain. Negative for gait problem, joint swelling, myalgias, neck pain and neck stiffness. Skin: Negative for color change. Allergic/Immunologic: Negative for food allergies. Neurological: Positive for dizziness and headaches. Negative for tremors, seizures, syncope, facial asymmetry, speech difficulty, weakness, light-headedness and numbness. Psychiatric/Behavioral: Negative for behavioral problems, confusion, hallucinations and sleep disturbance. Objective:   /86 (Site: Left Upper Arm, Position: Sitting, Cuff Size: Medium Adult)   Pulse 67   Wt 192 lb 8 oz (87.3 kg)   BMI 41.66 kg/m²     Physical Exam  Vitals signs reviewed. Eyes:      Pupils: Pupils are equal, round, and reactive to light. Neck:      Musculoskeletal: Normal range of motion. Cardiovascular:      Rate and Rhythm: Normal rate and regular rhythm. Heart sounds: No murmur. Pulmonary:      Effort: Pulmonary effort is normal.      Breath sounds: Normal breath sounds. Abdominal:      General: Bowel sounds are normal.   Musculoskeletal: Normal range of motion. Skin:     General: Skin is warm. Neurological:      Mental Status: She is alert and oriented to person, place, and time. Cranial Nerves: No cranial nerve deficit. Sensory: No sensory deficit. Motor: No abnormal muscle tone. Coordination: Coordination normal.      Deep Tendon Reflexes: Reflexes are normal and symmetric. Babinski sign absent on the right side. Babinski sign absent on the left side. Psychiatric:         Mood and Affect: Mood normal.       Patient is areflexic in the lower extremity but has some degree of wide-based gait    She has congenital nystagmus  No results found.     Lab Results   Component Value Date    WBC 5.8 02/04/2021    RBC 3.88 02/04/2021    HGB 12.2 02/04/2021    HCT 36.4 02/04/2021    MCV 93.8 02/04/2021    MCH 31.5 02/04/2021    MCHC 33.6 02/04/2021    RDW 13.4 02/04/2021     02/04/2021    MPV 8.7 12/08/2014     Lab Results   Component Value Date     02/04/2021    K 3.9 02/04/2021    K 2.8 12/24/2020     02/04/2021    CO2 25 02/04/2021    BUN 13 02/04/2021    CREATININE 0.76 02/04/2021    GFRAA >60.0 02/04/2021    LABGLOM >60.0 02/04/2021    GLUCOSE 119 02/04/2021    PROT 7.5 02/04/2021    LABALBU 3.6 02/04/2021    CALCIUM 9.2 02/04/2021    BILITOT <0.2 02/04/2021    ALKPHOS 84 02/04/2021    AST 26 02/04/2021    ALT 29 02/04/2021     Lab Results   Component Value Date    PROTIME 9.9 12/08/2014    INR 1.0 12/08/2014     Lab Results   Component Value Date    .800 12/22/2020    [id number] 268 12/22/2020    FOLATE 11.8 12/22/2020    IRON 50 02/04/2021    TIBC 249 02/04/2021     Lab Results   Component Value Date    TRIG 185 12/22/2020    HDL 31 12/22/2020    LDLCALC 275 12/22/2020     No results found for: Nelta Sinner, LABBENZ, CANNAB, COCAINESCRN, LABMETH, OPIATESCREENURINE, PHENCYCLIDINESCREENURINE, PPXUR, ETOH  No results found for: LITHIUM, DILFRTOT, VALPROATE    Assessment:       Diagnosis Orders   1. Migraine variant     2. Complicated migraine     3. Benign paroxysmal positional vertigo due to bilateral vestibular disorder     4. Dizziness     5. Lumbar radiculopathy  MRI LUMBAR SPINE WO CONTRAST   Basilar migraine with significant nausea and headache. Patient was seen in the hospital for the same. A complete evaluation was done in the hospital with an MRI which did not anything significant she had a CT angiograms done as well there is no large vessel disease. Her frequency appears to be at least once a week and occasionally more. Initially we will treat this with Imitrex for now and see if this helps if not she will require CGRP medication. Her main issues appears to be back pain. She is battling with this for 3 years and only x-rays have been done.   Patient has had previous physical therapy she is due to have more physical therapy though I truly feel that she may have central canal stenosis with a wide-based gait. Recommended an MRI of the lumbosacral spine. Depending on the results of the same will further advise  Details of her inpatient hospital records are reviewed as patient had no recall of many of this as she was quite sick when this occurred. This therefore became an extended evaluation. Of note patient has congenital nystagmus    Total time 42 minutes      Plan:      Orders Placed This Encounter   Procedures    MRI LUMBAR SPINE WO CONTRAST     Standing Status:   Future     Standing Expiration Date:   4/15/2022     Order Specific Question:   Reason for exam:     Answer:   Lumbar radiculopathy     No orders of the defined types were placed in this encounter. No follow-ups on file.       Dalia Virk MD

## 2021-05-14 ENCOUNTER — CLINICAL DOCUMENTATION (OUTPATIENT)
Dept: PHYSICAL THERAPY | Age: 46
End: 2021-05-14

## 2021-06-11 ENCOUNTER — HOSPITAL ENCOUNTER (OUTPATIENT)
Dept: MRI IMAGING | Age: 46
Discharge: HOME | End: 2021-06-13
Payer: COMMERCIAL

## 2021-06-11 DIAGNOSIS — M54.16 LUMBAR RADICULOPATHY: ICD-10-CM

## 2021-06-11 PROCEDURE — 72148 MRI LUMBAR SPINE W/O DYE: CPT

## 2021-09-02 ENCOUNTER — OFFICE VISIT (OUTPATIENT)
Dept: NEUROLOGY | Age: 46
End: 2021-09-02
Payer: COMMERCIAL

## 2021-09-02 VITALS
HEART RATE: 76 BPM | DIASTOLIC BLOOD PRESSURE: 78 MMHG | SYSTOLIC BLOOD PRESSURE: 132 MMHG | WEIGHT: 209 LBS | BODY MASS INDEX: 45.23 KG/M2

## 2021-09-02 DIAGNOSIS — G43.809 MIGRAINE VARIANT: Primary | ICD-10-CM

## 2021-09-02 DIAGNOSIS — G43.109 COMPLICATED MIGRAINE: ICD-10-CM

## 2021-09-02 DIAGNOSIS — R42 DIZZINESS: ICD-10-CM

## 2021-09-02 DIAGNOSIS — M54.16 LUMBAR RADICULOPATHY: ICD-10-CM

## 2021-09-02 DIAGNOSIS — R26.0 ATAXIC GAIT: ICD-10-CM

## 2021-09-02 DIAGNOSIS — H81.13 BENIGN PAROXYSMAL POSITIONAL VERTIGO DUE TO BILATERAL VESTIBULAR DISORDER: ICD-10-CM

## 2021-09-02 PROCEDURE — 1036F TOBACCO NON-USER: CPT | Performed by: PSYCHIATRY & NEUROLOGY

## 2021-09-02 PROCEDURE — G8417 CALC BMI ABV UP PARAM F/U: HCPCS | Performed by: PSYCHIATRY & NEUROLOGY

## 2021-09-02 PROCEDURE — 99214 OFFICE O/P EST MOD 30 MIN: CPT | Performed by: PSYCHIATRY & NEUROLOGY

## 2021-09-02 PROCEDURE — G8427 DOCREV CUR MEDS BY ELIG CLIN: HCPCS | Performed by: PSYCHIATRY & NEUROLOGY

## 2021-09-02 RX ORDER — NARATRIPTAN 2.5 MG/1
2.5 TABLET ORAL
Qty: 9 TABLET | Refills: 3 | Status: SHIPPED | OUTPATIENT
Start: 2021-09-02 | End: 2022-04-27

## 2021-09-02 RX ORDER — SUMATRIPTAN 100 MG/1
100 TABLET, FILM COATED ORAL
Qty: 9 TABLET | Refills: 3 | Status: CANCELLED | OUTPATIENT
Start: 2021-09-02 | End: 2021-09-02

## 2021-09-02 ASSESSMENT — ENCOUNTER SYMPTOMS
COLOR CHANGE: 0
NAUSEA: 0
CHOKING: 0
PHOTOPHOBIA: 0
BACK PAIN: 1
SHORTNESS OF BREATH: 0
VOMITING: 0
TROUBLE SWALLOWING: 0

## 2021-09-02 NOTE — PROGRESS NOTES
Subjective:      Patient ID: Eliane Opitz is a 50 y.o. female who presents today for:  Chief Complaint   Patient presents with    Follow-up     Pt states that things have not been to good, but are doing better than they were. She says the migraines are doing a little better. She says that she did go back to work. She wants to know if there is another medication to try. HPI 50 right-handed female with a history of multiple symptoms of migraine headaches and lumbar discopathy with pain and occasional dizzy spells. Patient is on multiple medications. She takes sumatriptan and this helps she has 1-2 headache days a month or sometimes 3 though the sumatriptan takes care of this. Patient has dizzy spells occasionally uses Antivert. He has considerable leg pains and therefore we recommend MRI of the lumbosacral spine which was done she has multiple levels of arthritic changes. She requires pain management. She had injection in the past though this was sometime ago. Has now returned back to work. She denies any recent falls injuries or trauma.   Past Medical History:   Diagnosis Date    Asthma     Depression     Endometriosis 03/2017    Hypothyroidism     Postmenopausal 2008    Type II diabetes mellitus, uncontrolled ([facility] [address]      Past Surgical History:   Procedure Laterality Date    DENTAL SURGERY      ENDOMETRIAL BIOPSY  03/2017    Alice lala    INNER EAR SURGERY Right 1993    ear perforation    ROTATOR CUFF REPAIR Left 12/12/2014    TONSILLECTOMY AND ADENOIDECTOMY      TYMPANOSTOMY TUBE PLACEMENT       Social History     Socioeconomic History    Marital status:      Spouse name: Not on file    Number of children: Not on file    Years of education: Not on file    Highest education level: Not on file   Occupational History    Not on file   Tobacco Use    Smoking status: Never Smoker    Smokeless tobacco: Never Used   Substance and Sexual Activity    Alcohol use: No    Drug use: No    Sexual activity: Yes   Other Topics Concern    Not on file   Social History Narrative    Not on file     Social Determinants of Health     Financial Resource Strain: High Risk    Difficulty of Paying Living Expenses: Very hard   Food Insecurity: Food Insecurity Present    Worried About Running Out of Food in the Last Year: Often true    Kj of Food in the Last Year: Often true   Transportation Needs: No Transportation Needs    Lack of Transportation (Medical): No    Lack of Transportation (Non-Medical):  No   Physical Activity:     Days of Exercise per Week:     Minutes of Exercise per Session:    Stress:     Feeling of Stress :    Social Connections:     Frequency of Communication with Friends and Family:     Frequency of Social Gatherings with Friends and Family:     Attends [employer] Services:     Active Member of Clubs or Organizations:     Attends Club or Organization Meetings:     Marital Status:    Intimate Partner Violence:     Fear of Current or Ex-Partner:     Emotionally Abused:     Physically Abused:     Sexually Abused:      Family History   Problem Relation Age of Onset    Depression Mother     Diabetes Maternal Aunt     Diabetes Maternal Uncle     Asthma Maternal Grandmother     Cancer Maternal Grandmother         lung    High Blood Pressure Maternal Grandmother     High Blood Pressure Maternal Grandfather     Stroke Maternal Grandfather      No Known Allergies    Current Outpatient Medications   Medication Sig Dispense Refill    naratriptan (AMERGE) 2.5 MG tablet Take 1 tablet by mouth once as needed for Migraine 2.5 mg at onset of headache, may repeat in 4 hours if needed 9 tablet 3    SUMAtriptan (IMITREX) 100 MG tablet Take 1 tablet by mouth once as needed for Migraine 9 tablet 3    meclizine (ANTIVERT) 25 MG tablet TAKE 1 TABLET BY MOUTH THREE TIMES DAILY FOR 10 DAYS      ibuprofen (ADVIL;MOTRIN) 800 MG tablet Take 1 tablet by mouth every 6 hours as Objective:   /78 (Site: Left Upper Arm, Position: Sitting, Cuff Size: Medium Adult)   Pulse 76   Wt 209 lb (94.8 kg)   BMI 45.23 kg/m²     Physical Exam  Vitals reviewed. Eyes:      Pupils: Pupils are equal, round, and reactive to light. Cardiovascular:      Rate and Rhythm: Normal rate and regular rhythm. Heart sounds: No murmur heard. Pulmonary:      Effort: Pulmonary effort is normal.      Breath sounds: Normal breath sounds. Abdominal:      General: Bowel sounds are normal.   Musculoskeletal:         General: Normal range of motion. Cervical back: Normal range of motion. Skin:     General: Skin is warm. Neurological:      Mental Status: She is alert and oriented to person, place, and time. Cranial Nerves: No cranial nerve deficit. Sensory: No sensory deficit. Motor: No abnormal muscle tone. Coordination: Coordination normal.      Deep Tendon Reflexes: Reflexes are normal and symmetric. Babinski sign absent on the right side. Babinski sign absent on the left side. Psychiatric:         Mood and Affect: Mood normal.         MRI LUMBAR SPINE WO CONTRAST    Result Date: 6/12/2021  MRI lumbar spine HISTORY: Low back pain radiating to the left lower extremity. Difficulty ambulating. COMPARISON: No prior lumbar spine imaging available for correlation. TECHNIQUE: Sagittal T1, T2, and inversion recovery. Axial and coronal T2. Sagittal T2 whole spine localizer. 3 plane T2 abdominal pelvic localizer. FINDINGS: Image quality compromised by motion. For the purposes of enumerating the lumbar disc levels the lowest lumbar-type disc will be referred to as L5-S1. Conus medullaris is unremarkable terminating just below the L1-2 disc level. Subtle dextroscoliosis lumbar spine. Vertebral body heights are maintained. No spondylolysis or pathologic marrow replacement.  T11-12 and T12-L1 disc levels are essentially unremarkable without significant degeneration, bulging/anterior thecal sac mass effect, or foraminal stenosis. L1-2 (L1): Disc space height and degree of hydration relatively well-maintained. Subtle right superior L2 Schmorl's node formation. Negligible posterior disc bulging and anterior thecal sac mass effect without canal or foraminal narrowing. L2-3 (L2): Subtle disc space narrowing. Mild to moderate loss of T2 signal indicating desiccation. Equivocal subtle vacuum disc phenomena. Tiny Schmorl's node formation at the superior and inferior endplate margins. Left paracentral posterior disc margin  tiny focus of increased T2 signal representing a trivial annular tear. Minimal posterior disc bulging and anterior thecal sac mass effect. Very mild narrowing of the thecal sac (9.6 mm AP ) accentuated by posterior epidural lipomatosis. Overall canal volumes maintained. Left foraminal disc endplate spur, with or without desiccated disc, not creating any significant foraminal narrowing with the exiting L2 nerve root passing above it. Right neural foramina are unremarkable. L3-4 (L3): Mild disc space narrowing and moderate desiccation. Equivocal mild vacuum disc phenomena. Tiny L4 superior endplate Schmorl's node. Minimal circumferential disc bulging posteriorly causing minimal anterior thecal sac mass effect. Mild-moderate  (7.8 mm AP) thecal sac narrowing due to posterior epidural lipomatosis. Overall canal volume is maintained. No significant foraminal narrowing. L4-5 (L4): Disc space height and degree of hydration maintained. No significant posterior disc bulging or anterior thecal sac mass effect. Minimal thecal sac narrowing due to posterior epidural lipomatosis with a well maintained canal volume. No significant foraminal stenosis. Minimal posterior ligamentous thickening. L5-S1 (L5): Disc space height relatively well maintained. Very subtle posterior disc bulging. No significant disc desiccation.  No significant posterior disc bulging/anterior thecal sac mass 02/04/2021    PROT 7.5 02/04/2021    LABALBU 3.6 02/04/2021    CALCIUM 9.2 02/04/2021    BILITOT <0.2 02/04/2021    ALKPHOS 84 02/04/2021    AST 26 02/04/2021    ALT 29 02/04/2021     Lab Results   Component Value Date    PROTIME 9.9 12/08/2014    INR 1.0 12/08/2014     Lab Results   Component Value Date    .800 12/22/2020    UMMSXZTL60 268 12/22/2020    FOLATE 11.8 12/22/2020    IRON 50 02/04/2021    TIBC 249 02/04/2021     Lab Results   Component Value Date    TRIG 185 12/22/2020    HDL 31 12/22/2020    LDLCALC 275 12/22/2020     No results found for: Lobito Dick, LABBENZ, CANNAB, COCAINESCRN, LABMETH, OPIATESCREENURINE, PHENCYCLIDINESCREENURINE, PPXUR, ETOH  No results found for: LITHIUM, DILFRTOT, VALPROATE    Assessment:       Diagnosis Orders   1. Migraine variant     2. Lumbar radiculopathy  Amb External Referral To Pain Clinic   3. Complicated migraine     4. Ataxic gait     5. Dizziness     6. Benign paroxysmal positional vertigo due to bilateral vestibular disorder     Basilar migraine with dizziness or truly this is dizziness secondary to benign patient vertigo this has not changed. Patient still has about 2 headache days a week quite not responsive to Imitrex. We will change this to [facility]. I recommended preventive therapy and will consider Ajovy for her. She does have hypertension and hypotensive headaches are not uncommon. It may present as basilar migraines. Patient's main issues appears to be back she is battling with this for 3 years and therefore we are further obtain MRI of the lumbosacral spine which shows multiple arthritic changes  Commended pain management for her and we referred to Dr. Dorothy Pena. Depending on the results of the same will further advise.       Plan:      Orders Placed This Encounter   Procedures    Amb External Referral To Pain Clinic     Referral Priority:   Routine     Referral Type:   Eval and Treat     Referral Reason:   Specialty Services Required Referred to Provider:   Harvey Blackburn MD     Requested Specialty:   Pain Medicine     Number of Visits Requested:   1     Orders Placed This Encounter   Medications    naratriptan (AMERGE) 2.5 MG tablet     Sig: Take 1 tablet by mouth once as needed for Migraine 2.5 mg at onset of headache, may repeat in 4 hours if needed     Dispense:  9 tablet     Refill:  3       No follow-ups on file.       Aliyah Marcano MD

## 2021-10-27 DIAGNOSIS — E11.9 TYPE 2 DIABETES MELLITUS WITHOUT COMPLICATION, WITHOUT LONG-TERM CURRENT USE OF INSULIN (HCC): ICD-10-CM

## 2021-10-27 NOTE — TELEPHONE ENCOUNTER
[address] [facility] Clinical Staff  Subject: Refill Request     QUESTIONS   Name of Medication? Other - Metformin   Patient-reported dosage and instructions? 500 mg once a day   How many days do you have left? 0   Preferred Pharmacy? [facility] phone number (if available)? [phone]   ---------------------------------------------------------------------------   --------------   Floridalma MONTOYA   What is the best way for the office to contact you? Do not leave any   message, patient will call back for answer   Preferred Call Back Phone Number?  [phone]
None

## 2022-01-10 ENCOUNTER — VIRTUAL VISIT (OUTPATIENT)
Dept: FAMILY MEDICINE CLINIC | Age: 47
End: 2022-01-10
Payer: COMMERCIAL

## 2022-01-10 DIAGNOSIS — Z20.822 ENCOUNTER BY TELEHEALTH FOR SUSPECTED COVID-19: Primary | ICD-10-CM

## 2022-01-10 LAB
Lab: NORMAL
PERFORMING INSTRUMENT: NORMAL
QC PASS/FAIL: NORMAL
SARS-COV-2, POC: NORMAL

## 2022-01-10 PROCEDURE — G8427 DOCREV CUR MEDS BY ELIG CLIN: HCPCS | Performed by: PHYSICIAN ASSISTANT

## 2022-01-10 PROCEDURE — 99213 OFFICE O/P EST LOW 20 MIN: CPT | Performed by: PHYSICIAN ASSISTANT

## 2022-01-10 PROCEDURE — 87426 SARSCOV CORONAVIRUS AG IA: CPT | Performed by: PHYSICIAN ASSISTANT

## 2022-01-10 ASSESSMENT — ENCOUNTER SYMPTOMS
NAUSEA: 0
SORE THROAT: 1
SINUS PRESSURE: 0
BACK PAIN: 0
CHEST TIGHTNESS: 0
ABDOMINAL PAIN: 0
COUGH: 1
SHORTNESS OF BREATH: 0
VOMITING: 0
DIARRHEA: 0
SINUS PAIN: 0

## 2022-01-10 ASSESSMENT — PATIENT HEALTH QUESTIONNAIRE - PHQ9
SUM OF ALL RESPONSES TO PHQ QUESTIONS 1-9: 0
SUM OF ALL RESPONSES TO PHQ9 QUESTIONS 1 & 2: 0
2. FEELING DOWN, DEPRESSED OR HOPELESS: 0
1. LITTLE INTEREST OR PLEASURE IN DOING THINGS: 0
SUM OF ALL RESPONSES TO PHQ QUESTIONS 1-9: 0

## 2022-01-10 NOTE — PROGRESS NOTES
1/10/2022    TELEHEALTH EVALUATION -- Audio/Visual (During ZQJTW-[id number] public health emergency)    Due to COVID 19 outbreak, patient's office visit was converted to a virtual visit. Patient was contacted and agreed to proceed with a virtual visit via [email or web address]  The risks and benefits of converting to a virtual visit were discussed in light of the current infectious disease epidemic. Patient also understood that insurance coverage and co-pays are up to their individual insurance plans. HPI:    Sam Maori (:  1975) has requested an audio/video evaluation for the following concern(s):    The patient is presenting today with CC of COVID-19 Symptoms that started 1 days ago. Cough- dry. Not taking anything   Sore throat- still eating and drinking. No fevers, chills, nausea or vomiting. Review of Systems   Constitutional: Negative for activity change, appetite change, chills and fever. HENT: Positive for congestion and sore throat. Negative for drooling, sinus pressure and sinus pain. Eyes: Negative for visual disturbance. Respiratory: Positive for cough. Negative for chest tightness and shortness of breath. Cardiovascular: Negative for chest pain. Gastrointestinal: Negative for abdominal pain, diarrhea, nausea and vomiting. Endocrine: Negative for cold intolerance. Genitourinary: Negative for dysuria, flank pain, frequency and hematuria. Musculoskeletal: Negative for arthralgias and back pain. Skin: Negative for rash. Allergic/Immunologic: Negative for food allergies. Neurological: Negative for weakness, light-headedness, numbness and headaches. Hematological: Does not bruise/bleed easily. Prior to Visit Medications    Medication Sig Taking?  Authorizing Provider   metFORMIN (GLUCOPHAGE) 500 MG tablet TAKE ONE TABLET BY MOUTH ONCE DAILY WITH  BREAKFAST Yes Anika Stroud PA-C   meclizine (ANTIVERT) 25 MG tablet TAKE 1 TABLET BY MOUTH THREE TIMES DAILY FOR 10 DAYS Yes Historical Provider, MD   ibuprofen (ADVIL;MOTRIN) 800 MG tablet Take 1 tablet by mouth every 6 hours as needed for Pain Yes Anika Stroud PA-C   levothyroxine (SYNTHROID) 200 MCG tablet One PO a day Yes Anika Stroud PA-C   lisinopril (PRINIVIL;ZESTRIL) 20 MG tablet Take 1 tablet by mouth daily Yes Anika Stroud PA-C   loratadine (CLARITIN) 10 MG tablet Take 1 tablet by mouth daily Yes Anika Stroud PA-C   fluticasone (FLONASE) 50 MCG/ACT nasal spray 1 spray by Each Nostril route daily Yes Anika Stroud PA-C   glucose monitoring kit (FREESTYLE) monitoring kit 1 kit by Does not apply route daily Yes Anika Stroud PA-C   blood glucose test strips (ASCENSIA AUTODISC VI;ONE TOUCH ULTRA TEST VI) strip Test BID. DX E11.8 NIDDM Yes Anika Stroud PA-C   EQ ALLERGY RELIEF 10 MG tablet TAKE 1 TABLET BY MOUTH ONCE DAILY Yes Anika Stroud PA-C   fluticasone (FLONASE) 50 MCG/ACT nasal spray USE 1 SPRAY(S) IN EACH NOSTRIL ONCE DAILY Yes Anika Stroud PA-C   Lancets MISC Test BID. DX E11.8 NIDDM Yes Anika Stroud PA-C   glucose monitoring kit (FREESTYLE) monitoring kit 1 kit by Does not apply route daily DX E11.8 NIDDM Yes Anika Stroud PA-C   nabumetone (RELAFEN) 500 MG tablet Take 1 tablet by mouth 2 times daily Yes Anika Stroud PA-C   naratriptan (AMERGE) 2.5 MG tablet Take 1 tablet by mouth once as needed for Migraine 2.5 mg at onset of headache, may repeat in 4 hours if needed  Sofía Maya MD   SUMAtriptan (IMITREX) 100 MG tablet Take 1 tablet by mouth once as needed for Migraine  Sofía Maya MD       Social History     Tobacco Use    Smoking status: Never Smoker    Smokeless tobacco: Never Used   Substance Use Topics    Alcohol use: No    Drug use:  No            PHYSICAL EXAMINATION:  [ INSTRUCTIONS:  \"[x]\" Indicates a positive item  \"[]\" Indicates a negative item  -- DELETE ALL ITEMS NOT EXAMINED]  [x] Alert  [x] Oriented to person/place/time    [x] No apparent distress  [] Toxic appearing    [] Face flushed appearing [] Sclera clear  [] Lips are cyanotic      [] Breathing appears normal  [] Appears tachypneic      [] Rash on visible skin    [x] Cranial Nerves II-XII grossly intact    [x] Motor grossly intact in visible upper extremities    [x] Motor grossly intact in visible lower extremities    [x] Normal Mood  [] Anxious appearing    [] Depressed appearing  [] Confused appearing      [] Poor short term memory  [] Poor long term memory    [] OTHER:      Due to this being a TeleHealth encounter, evaluation of the following organ systems is limited: Vitals/Constitutional/EENT/Resp/CV/GI//MS/Neuro/Skin/Heme-Lymph-Imm. ASSESSMENT/PLAN:  1. Encounter by telehealth for suspected COVID-19  - Discussed signs and symptoms which require immediate follow-up in ED/call to 911. Patient verbalized understanding.  - Supportive care-motrin, sudafed, humidifer, antihistamine, flonase, warm salt water gargles, honey with tea  - went over false negatives and when retesting will be necessary.   - POCT COVID-19, Antigen      Return if symptoms worsen or fail to improve. On this date 1/10/2022 I have spent 20 minutes reviewing previous notes, test results and face to face with the patient discussing the diagnosis and importance of compliance with the treatment plan as well as documenting on the day of the visit. An  electronic signature was used to authenticate this note. --MELONIE Pace on 1/10/2022 at 9:41 AM        Pursuant to the emergency declaration under the [address] [place] [address], [address] [place] [address] and the [employer] Act, this Virtual  Visit was conducted, with patient's consent, to reduce the patient's risk of exposure to COVID-19 and provide continuity of care for an established patient.     Services were provided through a video synchronous discussion virtually to substitute for in-person clinic visit.

## 2022-04-18 DIAGNOSIS — E03.9 ACQUIRED HYPOTHYROIDISM: ICD-10-CM

## 2022-04-19 ENCOUNTER — TELEPHONE (OUTPATIENT)
Dept: FAMILY MEDICINE CLINIC | Age: 47
End: 2022-04-19

## 2022-04-19 RX ORDER — LEVOTHYROXINE SODIUM 0.2 MG/1
TABLET ORAL
Qty: 90 TABLET | Refills: 1 | Status: SHIPPED | OUTPATIENT
Start: 2022-04-19

## 2022-04-19 NOTE — TELEPHONE ENCOUNTER
----- Message from Peg Mishra sent at 4/19/2022 11:50 AM EDT -----  Subject: Message to Provider    QUESTIONS  Information for Provider? Pt stated that she is completely out of her   medication and calling to check on the status of her refill, pls give the   pt a call.  ---------------------------------------------------------------------------  --------------  CALL BACK INFO  What is the best way for the office to contact you? OK to leave message on   voicemail  Preferred Call Back Phone Number? [phone]  ---------------------------------------------------------------------------  --------------  SCRIPT ANSWERS  Relationship to Patient?  Self

## 2022-04-19 NOTE — TELEPHONE ENCOUNTER
Future Appointments    Encounter Information    Provider Department Appt Notes   4/27/2022 Rudy Olsen [place] [facility] [place] [facility] 1[facility]6 [facility] Appt Reason: Routine Existing Condition Follow Up (Diabetes)   F/U DM & thyroid medications, Screened Haritha Lomax   Booking Code: [id number]       Past Visits    Date Provider Specialty Visit Type Primary Dx   01/10/2022 MELONIE Wiley Family Medicine Virtual Visit Encounter by telehealth for suspected COVID-19   09/02/2021 Rudy Olsen MD Neurology Office Visit Migraine variant   04/15/2021 Rudy Olsen MD Neurology Office Visit Migraine variant   04/14/2021 Anika Stroud PA-C Family Medicine Virtual Visit Acute midline back pain, unspecified back location

## 2022-04-22 ENCOUNTER — TELEPHONE (OUTPATIENT)
Dept: FAMILY MEDICINE CLINIC | Age: 47
End: 2022-04-22

## 2022-04-22 NOTE — TELEPHONE ENCOUNTER
----- Message from Cindy Allen, [address] [place] sent at 4/18/2022 11:43 AM EDT -----  Subject: Refill Request    QUESTIONS  Name of Medication? levothyroxine (SYNTHROID) 200 MCG tablet  Patient-reported dosage and instructions? Take 1 tab in am daily  How many days do you have left? 0  Preferred Pharmacy? [facility] phone number (if available)? [phone]  ---------------------------------------------------------------------------  --------------  Jim MONTOYA  What is the best way for the office to contact you? OK to leave message on   voicemail  Preferred Call Back Phone Number? [phone]  ---------------------------------------------------------------------------  --------------  SCRIPT ANSWERS  Relationship to Patient?  Self

## 2022-04-27 ENCOUNTER — OFFICE VISIT (OUTPATIENT)
Dept: NEUROLOGY | Age: 47
End: 2022-04-27
Payer: COMMERCIAL

## 2022-04-27 VITALS
HEART RATE: 74 BPM | SYSTOLIC BLOOD PRESSURE: 136 MMHG | BODY MASS INDEX: 46.31 KG/M2 | WEIGHT: 214 LBS | DIASTOLIC BLOOD PRESSURE: 84 MMHG

## 2022-04-27 DIAGNOSIS — R42 DIZZINESS: ICD-10-CM

## 2022-04-27 DIAGNOSIS — M54.16 LUMBAR RADICULOPATHY: ICD-10-CM

## 2022-04-27 DIAGNOSIS — G43.109 COMPLICATED MIGRAINE: ICD-10-CM

## 2022-04-27 DIAGNOSIS — R26.0 ATAXIC GAIT: ICD-10-CM

## 2022-04-27 DIAGNOSIS — G43.809 MIGRAINE VARIANT: Primary | ICD-10-CM

## 2022-04-27 PROCEDURE — G8417 CALC BMI ABV UP PARAM F/U: HCPCS | Performed by: PSYCHIATRY & NEUROLOGY

## 2022-04-27 PROCEDURE — 1036F TOBACCO NON-USER: CPT | Performed by: PSYCHIATRY & NEUROLOGY

## 2022-04-27 PROCEDURE — G8427 DOCREV CUR MEDS BY ELIG CLIN: HCPCS | Performed by: PSYCHIATRY & NEUROLOGY

## 2022-04-27 PROCEDURE — 99214 OFFICE O/P EST MOD 30 MIN: CPT | Performed by: PSYCHIATRY & NEUROLOGY

## 2022-04-27 RX ORDER — TIZANIDINE 2 MG/1
TABLET ORAL
COMMUNITY
Start: 2022-03-18

## 2022-04-27 RX ORDER — DICLOFENAC SODIUM 75 MG/1
TABLET, DELAYED RELEASE ORAL
COMMUNITY
Start: 2022-03-18

## 2022-04-27 RX ORDER — NARATRIPTAN 2.5 MG/1
2.5 TABLET ORAL
Qty: 9 TABLET | Refills: 3 | Status: CANCELLED | OUTPATIENT
Start: 2022-04-27 | End: 2022-04-27

## 2022-04-27 RX ORDER — MELOXICAM 7.5 MG/1
TABLET ORAL
COMMUNITY
Start: 2022-01-21

## 2022-04-27 RX ORDER — SUMATRIPTAN 100 MG/1
100 TABLET, FILM COATED ORAL
Qty: 9 TABLET | Refills: 3 | Status: SHIPPED | OUTPATIENT
Start: 2022-04-27 | End: 2022-04-27

## 2022-04-27 ASSESSMENT — ENCOUNTER SYMPTOMS
COLOR CHANGE: 0
VOMITING: 0
BACK PAIN: 1
TROUBLE SWALLOWING: 0
SHORTNESS OF BREATH: 0
PHOTOPHOBIA: 0
CHOKING: 0
NAUSEA: 0

## 2022-04-27 NOTE — PROGRESS NOTES
Subjective:      Patient ID: Bandar Mitchell is a 48 y.o. female who presents today for:  Chief Complaint   Patient presents with    Follow-up     Pt states that she is getting headaches a lot more lately, and thinks they are stress related. HPI 50 right-handed female history of Multiple symptoms of migraine headaches and low medical apathy and occasional dizzy spells. Patient continues on sumatriptan with 1-2 headache days a month and this does help. We had commended MRI of the lumbar spine due to her leg pain due to arthritic changes and this shows arthritic changes at multiple levels. She requires pain management. Patient has headaches which she thinks are stress related. Patient usual frequency is 1-2 headache days a month and last month of about 5 headache days though they were not all significant. She responds well to Imitrex. She denies any recent falls injuries or trauma. Back pain is managed by pain management and she has been referred for aqua therapy. She has had some injections which has not helped.   Her only other symptoms is cold arm on the left though has not noticed any color changes pulses are present and appear to be equal.  She is not any further dizzy spells    Past Medical History:   Diagnosis Date    Asthma     Depression     Endometriosis 03/2017    Hypothyroidism     Postmenopausal 2008    Type II diabetes mellitus, uncontrolled ([facility] [address]      Past Surgical History:   Procedure Laterality Date    DENTAL SURGERY      ENDOMETRIAL BIOPSY  03/2017    Alice lala    INNER EAR SURGERY Right 1993    ear perforation    ROTATOR CUFF REPAIR Left 12/12/2014    TONSILLECTOMY AND ADENOIDECTOMY      TYMPANOSTOMY TUBE PLACEMENT       Social History     Socioeconomic History    Marital status:      Spouse name: Not on file    Number of children: Not on file    Years of education: Not on file    Highest education level: Not on file   Occupational History    Not on file Tobacco Use    Smoking status: Never Smoker    Smokeless tobacco: Never Used   Substance and Sexual Activity    Alcohol use: No    Drug use: No    Sexual activity: Yes   Other Topics Concern    Not on file   Social History Narrative    Not on file     Social Determinants of Health     Financial Resource Strain:     Difficulty of Paying Living Expenses: Not on file   Food Insecurity:     Worried About Running Out of Food in the Last Year: Not on file    Kj of Food in the Last Year: Not on file   Transportation Needs:     Lack of Transportation (Medical): Not on file    Lack of Transportation (Non-Medical):  Not on file   Physical Activity:     Days of Exercise per Week: Not on file    Minutes of Exercise per Session: Not on file   Stress:     Feeling of Stress : Not on file   Social Connections:     Frequency of Communication with Friends and Family: Not on file    Frequency of Social Gatherings with Friends and Family: Not on file    Attends Yazidi Services: Not on file    Active Member of [address] [place] [address] or Organizations: Not on file    Attends Club or Organization Meetings: Not on file    Marital Status: Not on file   Intimate Partner Violence:     Fear of Current or Ex-Partner: Not on file    Emotionally Abused: Not on file    Physically Abused: Not on file    Sexually Abused: Not on file   Housing Stability:     Unable to Pay for Housing in the Last Year: Not on file    Number of [place] in the Last Year: Not on file    Unstable Housing in the Last Year: Not on file     Family History   Problem Relation Age of Onset    Depression Mother     Diabetes Maternal Aunt     Diabetes Maternal Uncle     Asthma Maternal Grandmother     Cancer Maternal Grandmother         lung    High Blood Pressure Maternal Grandmother     High Blood Pressure Maternal Grandfather     Stroke Maternal Grandfather      No Known Allergies    Current Outpatient Medications   Medication Sig Dispense Refill    diclofenac (VOLTAREN) 75 MG EC tablet TAKE 1 TABLET BY MOUTH TWICE DAILY AFTER A MEAL      meloxicam (MOBIC) 7.5 MG tablet TAKE 1 TABLET BY MOUTH TWICE DAILY WITH MEALS      tiZANidine (ZANAFLEX) 2 MG tablet TAKE 1 TABLET BY MOUTH THREE TIMES DAILY AS NEEDED FOR MUSCLE SPASM      levothyroxine (EUTHYROX) 200 MCG tablet Take 1 tablet by mouth once daily 90 tablet 1    EQ LORATADINE 10 MG tablet Take 1 tablet by mouth once daily 30 tablet 5    metFORMIN (GLUCOPHAGE) 500 MG tablet TAKE ONE TABLET BY MOUTH ONCE DAILY WITH  BREAKFAST 90 tablet 3    naratriptan (AMERGE) 2.5 MG tablet Take 1 tablet by mouth once as needed for Migraine 2.5 mg at onset of headache, may repeat in 4 hours if needed 9 tablet 3    SUMAtriptan (IMITREX) 100 MG tablet Take 1 tablet by mouth once as needed for Migraine 9 tablet 3    meclizine (ANTIVERT) 25 MG tablet TAKE 1 TABLET BY MOUTH THREE TIMES DAILY FOR 10 DAYS      ibuprofen (ADVIL;MOTRIN) 800 MG tablet Take 1 tablet by mouth every 6 hours as needed for Pain 120 tablet 1    lisinopril (PRINIVIL;ZESTRIL) 20 MG tablet Take 1 tablet by mouth daily 30 tablet 5    fluticasone (FLONASE) 50 MCG/ACT nasal spray 1 spray by Each Nostril route daily 1 Bottle 0    glucose monitoring kit (FREESTYLE) monitoring kit 1 kit by Does not apply route daily 1 kit 0    blood glucose test strips (ASCENSIA AUTODISC VI;ONE TOUCH ULTRA TEST VI) strip Test BID. DX E11.8 NIDDM 100 strip 6    EQ ALLERGY RELIEF 10 MG tablet TAKE 1 TABLET BY MOUTH ONCE DAILY 30 tablet 2    fluticasone (FLONASE) 50 MCG/ACT nasal spray USE 1 SPRAY(S) IN EACH NOSTRIL ONCE DAILY 1 Bottle 3    Lancets MISC Test BID. DX E11.8 NIDDM 100 each 3    glucose monitoring kit (FREESTYLE) monitoring kit 1 kit by Does not apply route daily DX E11.8 NIDDM 1 kit 0    nabumetone (RELAFEN) 500 MG tablet Take 1 tablet by mouth 2 times daily 60 tablet 3     No current facility-administered medications for this visit. Review of Systems   Constitutional: Negative for fever. HENT: Negative for ear pain, tinnitus and trouble swallowing. Eyes: Negative for photophobia and visual disturbance. Respiratory: Negative for choking and shortness of breath. Cardiovascular: Negative for chest pain and palpitations. Gastrointestinal: Negative for nausea and vomiting. Musculoskeletal: Positive for back pain, gait problem and myalgias. Negative for joint swelling, neck pain and neck stiffness. Skin: Negative for color change. Allergic/Immunologic: Negative for food allergies. Neurological: Positive for dizziness. Negative for tremors, seizures, syncope, facial asymmetry, speech difficulty, weakness, light-headedness, numbness and headaches. Psychiatric/Behavioral: Negative for behavioral problems, confusion, hallucinations and sleep disturbance. Objective:   /84 (Site: Left Upper Arm, Position: Sitting, Cuff Size: Medium Adult)   Pulse 74   Wt 214 lb (97.1 kg)   BMI 46.31 kg/m²     Physical Exam  Vitals reviewed. Eyes:      Pupils: Pupils are equal, round, and reactive to light. Cardiovascular:      Rate and Rhythm: Normal rate and regular rhythm. Heart sounds: No murmur heard. Pulmonary:      Effort: Pulmonary effort is normal.      Breath sounds: Normal breath sounds. Abdominal:      General: Bowel sounds are normal.   Musculoskeletal:         General: Normal range of motion. Cervical back: Normal range of motion. Skin:     General: Skin is warm. Neurological:      Mental Status: She is alert and oriented to person, place, and time. Cranial Nerves: No cranial nerve deficit. Sensory: No sensory deficit. Motor: No abnormal muscle tone. Coordination: Coordination normal.      Deep Tendon Reflexes: Reflexes are normal and symmetric. Babinski sign absent on the right side. Babinski sign absent on the left side.    Psychiatric:         Mood and Affect: Mood normal.       Patient has a waddling gait and areflexia in the lower extremities. MRI LUMBAR SPINE WO CONTRAST    Result Date: 6/12/2021  MRI lumbar spine HISTORY: Low back pain radiating to the left lower extremity. Difficulty ambulating. COMPARISON: No prior lumbar spine imaging available for correlation. TECHNIQUE: Sagittal T1, T2, and inversion recovery. Axial and coronal T2. Sagittal T2 whole spine localizer. 3 plane T2 abdominal pelvic localizer. FINDINGS: Image quality compromised by motion. For the purposes of enumerating the lumbar disc levels the lowest lumbar-type disc will be referred to as L5-S1. Conus medullaris is unremarkable terminating just below the L1-2 disc level. Subtle dextroscoliosis lumbar spine. Vertebral body heights are maintained. No spondylolysis or pathologic marrow replacement. T11-12 and T12-L1 disc levels are essentially unremarkable without significant degeneration, bulging/anterior thecal sac mass effect, or foraminal stenosis. L1-2 (L1): Disc space height and degree of hydration relatively well-maintained. Subtle right superior L2 Schmorl's node formation. Negligible posterior disc bulging and anterior thecal sac mass effect without canal or foraminal narrowing. L2-3 (L2): Subtle disc space narrowing. Mild to moderate loss of T2 signal indicating desiccation. Equivocal subtle vacuum disc phenomena. Tiny Schmorl's node formation at the superior and inferior endplate margins. Left paracentral posterior disc margin  tiny focus of increased T2 signal representing a trivial annular tear. Minimal posterior disc bulging and anterior thecal sac mass effect. Very mild narrowing of the thecal sac (9.6 mm AP ) accentuated by posterior epidural lipomatosis. Overall canal volumes maintained. Left foraminal disc endplate spur, with or without desiccated disc, not creating any significant foraminal narrowing with the exiting L2 nerve root passing above it.  Right neural foramina are unremarkable. L3-4 (L3): Mild disc space narrowing and moderate desiccation. Equivocal mild vacuum disc phenomena. Tiny L4 superior endplate Schmorl's node. Minimal circumferential disc bulging posteriorly causing minimal anterior thecal sac mass effect. Mild-moderate  (7.8 mm AP) thecal sac narrowing due to posterior epidural lipomatosis. Overall canal volume is maintained. No significant foraminal narrowing. L4-5 (L4): Disc space height and degree of hydration maintained. No significant posterior disc bulging or anterior thecal sac mass effect. Minimal thecal sac narrowing due to posterior epidural lipomatosis with a well maintained canal volume. No significant foraminal stenosis. Minimal posterior ligamentous thickening. L5-S1 (L5): Disc space height relatively well maintained. Very subtle posterior disc bulging. No significant disc desiccation. No significant posterior disc bulging/anterior thecal sac mass effect, canal or foraminal stenosis. Very mild right superior facet joint degenerative changes. Facet joint articulations at the remaining lumbar levels are essentially unremarkable. Posterior paraspinal muscles with very subtle asymmetric left greater than right fatty marbling. Fatty marbling of the gluteus geo muscles included on the edge of the study. Incidental note is made of mild parasagittal subcutaneous fat edema superficial to the spinous processes. This is a nonspecific finding that may be associated with increased weight, female gender and/or advanced age. Incidental note is made of cervical spondylitic changes on the T2 whole spine localizer most pronounced at C6-7 where there is a question of likely mild canal narrowing due to posterior disc bulging or protrusion. Liver is partly included on the exam and is enlarged measuring over 21 cm in craniocaudal extent. Image quality compromised by motion.  Mild L2-3 and L3-4 degenerative disc disease where there is mild and mild to moderate thecal sac narrowing, respectively, due to mild disc bulging and posterior epidural lipomatosis, but overall maintained canal volume. L2-3 left paracentral trivial annular tear, and left foraminal predominantly endplate spur, the latter not contributing to significant foraminal narrowing. Cervical spondylosis most pronounced at C6-C7, partly included on the localizer. Suspect hepatic enlargement. Lab Results   Component Value Date    WBC 5.8 02/04/2021    RBC 3.88 02/04/2021    HGB 12.2 02/04/2021    HCT 36.4 02/04/2021    MCV 93.8 02/04/2021    MCH 31.5 02/04/2021    MCHC 33.6 02/04/2021    RDW 13.4 02/04/2021     02/04/2021    MPV 8.7 12/08/2014     Lab Results   Component Value Date     02/04/2021    K 3.9 02/04/2021    K 2.8 12/24/2020     02/04/2021    CO2 25 02/04/2021    BUN 13 02/04/2021    CREATININE 0.76 02/04/2021    GFRAA >60.0 02/04/2021    LABGLOM >60.0 02/04/2021    GLUCOSE 119 02/04/2021    PROT 7.5 02/04/2021    LABALBU 3.6 02/04/2021    CALCIUM 9.2 02/04/2021    BILITOT <0.2 02/04/2021    ALKPHOS 84 02/04/2021    AST 26 02/04/2021    ALT 29 02/04/2021     Lab Results   Component Value Date    PROTIME 9.9 12/08/2014    INR 1.0 12/08/2014     Lab Results   Component Value Date    .800 12/22/2020    VGAPNZWB25 268 12/22/2020    FOLATE 11.8 12/22/2020    IRON 50 02/04/2021    TIBC 249 02/04/2021     Lab Results   Component Value Date    TRIG 185 12/22/2020    HDL 31 12/22/2020    LDLCALC 275 12/22/2020     No results found for: LABAMPH, BARBSCNU, LABBENZ, CANNAB, COCAINESCRN, LABMETH, OPIATESCREENURINE, PHENCYCLIDINESCREENURINE, PPXUR, ETOH  No results found for: LITHIUM, DILFRTOT, VALPROATE    Assessment:       Diagnosis Orders   1. Migraine variant     2. Lumbar radiculopathy     3. Complicated migraine     4. Dizziness     5. Ataxic gait     Migraine variant with a suggestion of headaches quite responsive to sumatriptan.   Patient has 2 headache days a month but sometimes more depending on the stress. No other headache type has occurred and therefore gait not recommended any other. Treat treatments. Lumbar radiculopathy with pain management. She has pain in the groins on the left and she is areflexic in the lower extremity. Her MRI though does not show any Sequent except for some arthritic changes. Obtained history lacerations. She has occasional dizziness and has suggestion of benign patient vertigo which has not flared up of recent. In the interim if she does occurs then we will consider Antivert. Plan:      No orders of the defined types were placed in this encounter. No orders of the defined types were placed in this encounter. No follow-ups on file.       Deysi Saxena MD

## 2022-05-09 ENCOUNTER — NURSE TRIAGE (OUTPATIENT)
Dept: OTHER | Facility: CLINIC | Age: 47
End: 2022-05-09

## 2022-05-09 ENCOUNTER — TELEPHONE (OUTPATIENT)
Dept: FAMILY MEDICINE CLINIC | Age: 47
End: 2022-05-09

## 2022-05-09 DIAGNOSIS — I10 ESSENTIAL HYPERTENSION: ICD-10-CM

## 2022-05-09 NOTE — TELEPHONE ENCOUNTER
----- Message from General Engle sent at 5/9/2022 12:11 PM EDT -----  Subject: Message to Provider    QUESTIONS  Information for Provider? Patient returned from triage from swelling in   both her feet. Doctor had prescribed fluid meds prior for this instance   before. Patient wonders if doctor do again before her appointment.   ---------------------------------------------------------------------------  --------------  [address]  What is the best way for the office to contact you? OK to leave message on   voicemail  Preferred Call Back Phone Number? [phone]  ---------------------------------------------------------------------------  --------------  SCRIPT ANSWERS  Relationship to Patient?  Self

## 2022-05-09 NOTE — TELEPHONE ENCOUNTER
Received call from [facility] with Red Flag Complaint. Subjective: Caller states \"I have swelling in my feet, the R foot has more swelling than the L foot, but both are swollen some and the swelling comes up into the ankles. \"     Onset: 1 week ago; gradual      Pain Severity: pain between a 4 and 5    Temperature: no fever      Recommended disposition: be seen by Provider. Pt is asking to change her appointment until after 1500 on 5/13 so that she can get to the appointment as she has to work that day. Pt is asking for a prescription for fluid meds to be called in as soon as possible d/t the foot swelling. Pt reports that elevating feet is not working to get the fluid out. Care advice provided, patient verbalizes understanding; denies any other questions or concerns; instructed to call back for any new or worsening symptoms. Attention Provider: Thank you for allowing me to participate in the care of your patient. The patient was connected to triage in response to information provided to the [facility]. Please do not respond through this encounter as the response is not directed to a shared pool.         Reason for Disposition   [1] MODERATE pain (e.g., interferes with normal activities, limping) AND [2] present > 3 days    Protocols used: ANKLE SWELLING-ADULT-AH

## 2022-05-11 RX ORDER — LISINOPRIL 20 MG/1
TABLET ORAL
Qty: 30 TABLET | Refills: 3 | Status: SHIPPED | OUTPATIENT
Start: 2022-05-11

## 2022-05-27 ENCOUNTER — OFFICE VISIT (OUTPATIENT)
Dept: FAMILY MEDICINE CLINIC | Age: 47
End: 2022-05-27
Payer: COMMERCIAL

## 2022-05-27 VITALS
OXYGEN SATURATION: 98 % | BODY MASS INDEX: 43.8 KG/M2 | HEART RATE: 74 BPM | TEMPERATURE: 98 F | SYSTOLIC BLOOD PRESSURE: 130 MMHG | DIASTOLIC BLOOD PRESSURE: 82 MMHG | HEIGHT: 57 IN | WEIGHT: 203 LBS

## 2022-05-27 DIAGNOSIS — H65.93 FLUID LEVEL BEHIND TYMPANIC MEMBRANE OF BOTH EARS: ICD-10-CM

## 2022-05-27 DIAGNOSIS — R60.0 BILATERAL LEG EDEMA: ICD-10-CM

## 2022-05-27 DIAGNOSIS — E11.9 TYPE 2 DIABETES MELLITUS WITHOUT COMPLICATION, WITHOUT LONG-TERM CURRENT USE OF INSULIN (HCC): Primary | ICD-10-CM

## 2022-05-27 DIAGNOSIS — I10 ESSENTIAL HYPERTENSION: ICD-10-CM

## 2022-05-27 DIAGNOSIS — R16.0 HEPATOMEGALY: ICD-10-CM

## 2022-05-27 DIAGNOSIS — Z12.31 SCREENING MAMMOGRAM, ENCOUNTER FOR: ICD-10-CM

## 2022-05-27 DIAGNOSIS — E03.9 ACQUIRED HYPOTHYROIDISM: ICD-10-CM

## 2022-05-27 DIAGNOSIS — Z12.11 COLON CANCER SCREENING: ICD-10-CM

## 2022-05-27 LAB — HBA1C MFR BLD: 9.3 %

## 2022-05-27 PROCEDURE — 83036 HEMOGLOBIN GLYCOSYLATED A1C: CPT | Performed by: PHYSICIAN ASSISTANT

## 2022-05-27 PROCEDURE — 3046F HEMOGLOBIN A1C LEVEL >9.0%: CPT | Performed by: PHYSICIAN ASSISTANT

## 2022-05-27 PROCEDURE — 1036F TOBACCO NON-USER: CPT | Performed by: PHYSICIAN ASSISTANT

## 2022-05-27 PROCEDURE — G8417 CALC BMI ABV UP PARAM F/U: HCPCS | Performed by: PHYSICIAN ASSISTANT

## 2022-05-27 PROCEDURE — 99214 OFFICE O/P EST MOD 30 MIN: CPT | Performed by: PHYSICIAN ASSISTANT

## 2022-05-27 PROCEDURE — 2022F DILAT RTA XM EVC RTNOPTHY: CPT | Performed by: PHYSICIAN ASSISTANT

## 2022-05-27 PROCEDURE — G8427 DOCREV CUR MEDS BY ELIG CLIN: HCPCS | Performed by: PHYSICIAN ASSISTANT

## 2022-05-27 RX ORDER — BACITRACIN ZINC AND POLYMYXIN B SULFATE 500; 1000 [USP'U]/G; [USP'U]/G
OINTMENT TOPICAL
Qty: 30 G | Refills: 2 | Status: SHIPPED | OUTPATIENT
Start: 2022-05-27 | End: 2022-06-03

## 2022-05-27 RX ORDER — FLUTICASONE PROPIONATE 50 MCG
1 SPRAY, SUSPENSION (ML) NASAL DAILY
Qty: 16 G | Refills: 5 | Status: SHIPPED | OUTPATIENT
Start: 2022-05-27

## 2022-05-27 RX ORDER — HYDROCHLOROTHIAZIDE 12.5 MG/1
12.5 CAPSULE, GELATIN COATED ORAL DAILY
Qty: 30 CAPSULE | Refills: 3 | Status: SHIPPED | OUTPATIENT
Start: 2022-05-27

## 2022-05-27 SDOH — ECONOMIC STABILITY: FOOD INSECURITY: WITHIN THE PAST 12 MONTHS, YOU WORRIED THAT YOUR FOOD WOULD RUN OUT BEFORE YOU GOT MONEY TO BUY MORE.: NEVER TRUE

## 2022-05-27 SDOH — ECONOMIC STABILITY: FOOD INSECURITY: WITHIN THE PAST 12 MONTHS, THE FOOD YOU BOUGHT JUST DIDN'T LAST AND YOU DIDN'T HAVE MONEY TO GET MORE.: NEVER TRUE

## 2022-05-27 ASSESSMENT — SOCIAL DETERMINANTS OF HEALTH (SDOH): HOW HARD IS IT FOR YOU TO PAY FOR THE VERY BASICS LIKE FOOD, HOUSING, MEDICAL CARE, AND HEATING?: NOT HARD AT ALL

## 2022-05-27 ASSESSMENT — ENCOUNTER SYMPTOMS: BACK PAIN: 1

## 2022-05-27 NOTE — PROGRESS NOTES
Felicity Dougherty 9, 48 y.o. female presents today with:  Chief Complaint   Patient presents with    Diabetes     Patient presents today for f/u.  Medication Refill       Adherence:   Medication compliance:  Compliant most of the time  Diet compliance:  compliant most of the time  Weight trend: stable  Current exercise: no regular exercise      Diabetes Mellitus Type 2: Current symptoms/problems include none. blood sugar records: 200s   Eye exam current (within one year): unknown  Tobacco history: She  reports that she has never smoked. She has never used smokeless tobacco.   Daily Aspirin? No:   Known diabetic complications: none        Lab Results   Component Value Date    LABA1C 9.3 05/27/2022    LABA1C 5.8 02/04/2021    LABA1C 6.8 (H) 12/21/2020     Lab Results   Component Value Date    LABMICR 2.40 (H) 10/25/2016    CREATININE 0.76 02/04/2021     Lab Results   Component Value Date    ALT 29 02/04/2021    AST 26 02/04/2021     Lab Results   Component Value Date    CHOL 343 (H) 12/22/2020    TRIG 185 (H) 12/22/2020    HDL 31 (L) 12/22/2020    LDLCALC 275 (H) 12/22/2020          HPI  Pt is here for f.u on dm - has not taken med in a month-resumed meds in the last few weeks- no particular reason  C/o swelling of les x past 3 wks - skin is tight and puritic  States she was told her liver was on MRI lumbar spine denies abdominal pain change in bowel habits  Followed by Andrew Catalan md for chronic back  pain - on tinazadine and lyrica which helps    Review of Systems   Cardiovascular: Positive for leg swelling. Musculoskeletal: Positive for back pain. Skin: Positive for rash. All other systems reviewed and are negative.         Past Medical History:   Diagnosis Date    Asthma     Depression     Endometriosis 03/2017    Hypothyroidism     Postmenopausal 2008    Type II diabetes mellitus, uncontrolled ([facility] [address]      Past Surgical History:   Procedure Laterality Date    DENTAL SURGERY      ENDOMETRIAL BIOPSY  03/2017    Alice lala    INNER EAR SURGERY Right 1993    ear perforation    ROTATOR CUFF REPAIR Left 12/12/2014    TONSILLECTOMY AND ADENOIDECTOMY      TYMPANOSTOMY TUBE PLACEMENT       Social History     Socioeconomic History    Marital status:      Spouse name: Not on file    Number of children: Not on file    Years of education: Not on file    Highest education level: Not on file   Occupational History    Not on file   Tobacco Use    Smoking status: Never Smoker    Smokeless tobacco: Never Used   Substance and Sexual Activity    Alcohol use: No    Drug use: No    Sexual activity: Yes   Other Topics Concern    Not on file   Social History Narrative    Not on file     Social Determinants of Health     Financial Resource Strain: Low Risk     Difficulty of Paying Living Expenses: Not hard at all   Food Insecurity: No Food Insecurity    Worried About [address] in the Last Year: Never true    Kj of Food in the Last Year: Never true   Transportation Needs: No Transportation Needs    Lack of Transportation (Medical): No    Lack of Transportation (Non-Medical):  No   Physical Activity:     Days of Exercise per Week: Not on file    Minutes of Exercise per Session: Not on file   Stress:     Feeling of Stress : Not on file   Social Connections:     Frequency of Communication with Friends and Family: Not on file    Frequency of Social Gatherings with Friends and Family: Not on file    Attends Zoroastrianism Services: Not on file    Active Member of Clubs or Organizations: Not on file    Attends Club or Organization Meetings: Not on file    Marital Status: Not on file   Intimate Partner Violence:     Fear of Current or Ex-Partner: Not on file    Emotionally Abused: Not on file    Physically Abused: Not on file    Sexually Abused: Not on file   Housing Stability:     Unable to Pay for Housing in the Last Year: Not on file    Number of [place] in the Last Year: Not on file    Unstable Housing in the Last Year: Not on file     Family History   Problem Relation Age of Onset    Depression Mother     Diabetes Maternal Aunt     Diabetes Maternal Uncle     Asthma Maternal Grandmother     Cancer Maternal Grandmother         lung    High Blood Pressure Maternal Grandmother     High Blood Pressure Maternal Grandfather     Stroke Maternal Grandfather      No Known Allergies  Current Outpatient Medications   Medication Sig Dispense Refill    hydroCHLOROthiazide (MICROZIDE) 12.5 MG capsule Take 1 capsule by mouth daily 30 capsule 3    bacitracin-polymyxin b (POLYSPORIN) 500-30577 UNIT/GM ointment Apply topically daily. 30 g 2    fluticasone (FLONASE) 50 MCG/ACT nasal spray 1 spray by Each Nostril route daily 16 g 5    lisinopril (PRINIVIL;ZESTRIL) 20 MG tablet Take 1 tablet by mouth once daily 30 tablet 3    diclofenac (VOLTAREN) 75 MG EC tablet TAKE 1 TABLET BY MOUTH TWICE DAILY AFTER A MEAL      meloxicam (MOBIC) 7.5 MG tablet TAKE 1 TABLET BY MOUTH TWICE DAILY WITH MEALS      tiZANidine (ZANAFLEX) 2 MG tablet TAKE 1 TABLET BY MOUTH THREE TIMES DAILY AS NEEDED FOR MUSCLE SPASM      levothyroxine (EUTHYROX) 200 MCG tablet Take 1 tablet by mouth once daily 90 tablet 1    EQ LORATADINE 10 MG tablet Take 1 tablet by mouth once daily 30 tablet 5    metFORMIN (GLUCOPHAGE) 500 MG tablet TAKE ONE TABLET BY MOUTH ONCE DAILY WITH  BREAKFAST 90 tablet 3    meclizine (ANTIVERT) 25 MG tablet TAKE 1 TABLET BY MOUTH THREE TIMES DAILY FOR 10 DAYS      ibuprofen (ADVIL;MOTRIN) 800 MG tablet Take 1 tablet by mouth every 6 hours as needed for Pain 120 tablet 1    fluticasone (FLONASE) 50 MCG/ACT nasal spray 1 spray by Each Nostril route daily 1 Bottle 0    glucose monitoring kit (FREESTYLE) monitoring kit 1 kit by Does not apply route daily 1 kit 0    blood glucose test strips (ASCENSIA AUTODISC VI;ONE TOUCH ULTRA TEST VI) strip Test BID.   DX E11.8 NIDDM 100 strip 6    EQ ALLERGY RELIEF 10 MG tablet TAKE 1 TABLET BY MOUTH ONCE DAILY 30 tablet 2    fluticasone (FLONASE) 50 MCG/ACT nasal spray USE 1 SPRAY(S) IN EACH NOSTRIL ONCE DAILY 1 Bottle 3    Lancets MISC Test BID. DX E11.8 NIDDM 100 each 3    glucose monitoring kit (FREESTYLE) monitoring kit 1 kit by Does not apply route daily DX E11.8 NIDDM 1 kit 0    nabumetone (RELAFEN) 500 MG tablet Take 1 tablet by mouth 2 times daily 60 tablet 3    SUMAtriptan (IMITREX) 100 MG tablet Take 1 tablet by mouth once as needed for Migraine 9 tablet 3    naratriptan (AMERGE) 2.5 MG tablet Take 1 tablet by mouth once as needed for Migraine 2.5 mg at onset of headache, may repeat in 4 hours if needed 9 tablet 3     No current facility-administered medications for this visit. Objective    Vitals:    05/27/22 0922   BP: 130/82   Site: Right Upper Arm   Position: Sitting   Cuff Size: Large Adult   Pulse: 74   Temp: 98 °F (36.7 °C)   TempSrc: Temporal   SpO2: 98%   Weight: 203 lb (92.1 kg)   Height: 4' 9\" (1.448 m)     Physical Exam  Constitutional:       General: She is not in acute distress. Appearance: She is obese. She is not ill-appearing. HENT:      Head: Normocephalic and atraumatic. Eyes:      Conjunctiva/sclera: Conjunctivae normal.      Pupils: Pupils are equal, round, and reactive to light. Comments: Strabismus     Neck:      Thyroid: No thyromegaly. Cardiovascular:      Rate and Rhythm: Normal rate and regular rhythm. Heart sounds: Normal heart sounds. No murmur heard. Pulmonary:      Effort: Pulmonary effort is normal. No respiratory distress. Breath sounds: Normal breath sounds. No wheezing, rhonchi or rales. Abdominal:      General: Bowel sounds are normal.      Palpations: Abdomen is soft. There is no mass. Tenderness: There is no abdominal tenderness. There is no guarding. Musculoskeletal:         General: Tenderness present. Normal range of motion.       Cervical back: Normal range of motion and neck supple. Right lower leg: Edema present. Left lower leg: Edema present. Comments: Excoriations bilat les   Lymphadenopathy:      Cervical: No cervical adenopathy. Skin:     General: Skin is warm and dry. Coloration: Skin is not jaundiced or pale. Findings: Rash present. Neurological:      General: No focal deficit present. Mental Status: She is alert and oriented to person, place, and time. Cranial Nerves: No cranial nerve deficit. Motor: No weakness. Coordination: Coordination normal.      Gait: Gait normal.   Psychiatric:         Mood and Affect: Mood normal.         Behavior: Behavior normal.         Thought Content: Thought content normal.         Judgment: Judgment normal.              Assessment & Plan    Diagnosis Orders   1. Type 2 diabetes mellitus without complication, without long-term current use of insulin (HCC)  POCT glycosylated hemoglobin (Hb A1C)    Comprehensive Metabolic Panel    Lipid, Fasting    CBC with Auto Differential   2. Essential hypertension  Comprehensive Metabolic Panel    Lipid, Fasting    CBC with Auto Differential   3. Acquired hypothyroidism  TSH with Reflex   4. Bilateral leg edema  hydroCHLOROthiazide (MICROZIDE) 12.5 MG capsule   5. Colon cancer screening  Fecal DNA Colorectal cancer screening (Cologuard)   6. Screening mammogram, encounter for  LOU DIGITAL SCREEN W OR WO CAD BILATERAL   7. Fluid level behind tympanic membrane of both ears  LOU DIGITAL SCREEN W OR WO CAD BILATERAL   8.  Hepatomegaly  US LIVER SPLEEN         Orders Placed This Encounter   Procedures    Fecal DNA Colorectal cancer screening (Cologuard)    LOU DIGITAL SCREEN W OR WO CAD BILATERAL     Standing Status:   Future     Standing Expiration Date:   7/27/2023    US LIVER SPLEEN     Standing Status:   Future     Standing Expiration Date:   5/27/2023    Comprehensive Metabolic Panel     Standing Status:   Future     Standing Expiration Date: 2023    Lipid, Fasting     Standing Status:   Future     Standing Expiration Date:   2023    CBC with Auto Differential     Standing Status:   Future     Standing Expiration Date:   2023    TSH with Reflex     Standing Status:   Future     Standing Expiration Date:   2023    POCT glycosylated hemoglobin (Hb A1C)     Orders Placed This Encounter   Medications    hydroCHLOROthiazide (MICROZIDE) 12.5 MG capsule     Sig: Take 1 capsule by mouth daily     Dispense:  30 capsule     Refill:  3    bacitracin-polymyxin b (POLYSPORIN) 500-08747 UNIT/GM ointment     Sig: Apply topically daily. Dispense:  30 g     Refill:  2    fluticasone (FLONASE) 50 MCG/ACT nasal spray     Si spray by Each Nostril route daily     Dispense:  16 g     Refill:  5     There are no discontinued medications. Return in about 3 months (around 2022) for repeat labs.     Anika Stroud PA-C

## 2022-08-15 NOTE — TELEPHONE ENCOUNTER
[facility] FOLLOW UP NOTE  HEMATOLOGY AND ONCOLOGY    ASSESSMENT AND PLAN   In summary, 72 year old female with abnormal labs who presents today for initial consultation    IMPRESSION/PLAN:  1. IgG kappa monoclonal gammopathy  2. abnormal bone scan with increased activity a thoracic spine  3. History of recurrent VTE  a. Unclear details  b. On lifelong blood thinners  4. Elevated liver enzymes    Discussed clinical presentation, labs, imaging and overall management with patient.  Reviewed CT scan and nuclear med bone scan with patient including recent labs.  We discussed findings are consistent with plasma cell dyscrasia likely MGUS.  We discussed indications and recommendations for bone marrow biopsy and aspiration.  In addition we discussed recommendations for additional imaging as per radiology.  Patient feels most comfortable proceeding with PET-CT scan at present time.  We will consider bone marrow biopsy and aspiration pending results of PET-CT scan/repeat labs.  Multiple management options were discussed with patient she is agreeable with above.    1. PET-CT scan  2. Discussed healthy diet and lifestyle.  3. Discussed age-appropriate cancer screening.   4. Follow up in *2-4 weeks to discuss results or sooner if needed    Discussed with patient the natural history, course and prognosis of illness.    Therapeutic options discussed and explained.  Side effects, risks and benefits, and alternatives discussed.  Patient acknowledges understanding of disease and agrees with treatment plan.    All questions were answered satisfactorily. Patient is instructed to contact us should issue arise prior to her next scheduled appointment. I spent time on this case reviewing documentation, interpreting results, updating H&P, face to face time and coordinating care.    Thank you for letting me participate in the care of this patient.    DO Brown Martinez [facility] [place]   [facility]  I sent medications over right now [facility]    Please note that this chart was generated using voice recognition Fluency Direct dictation software.  Although every effort was made to the ensure accuracy of this automated transcription, some errors in transcription may have occurred.      SUBJECTIVES   HEMATOLOGY/ONCOLOGY SUMMARY  No Patient Care Coordination Note on file.      CANCER STAGING  Oncology History    No history exists.       CHIEF COMPLAINT  Chief Complaint   Patient presents with   • Office Visit     Plasma cell dyscrasia   • Convey Results     CT       INTERVAL HISTORY  Patient presents today for follow up visit. Patient states she is doing well overall and has no specific complaints. Patient denies chest pain, shortness of breath, fevers, chills, nausea, vomiting, abdominal pain, blood in urine or stool, focal neurological deficits, B symptoms, masses/adenopathy.      REVIEW OF SYSTEMS  Ten-point review of systems documented by MA are reviewed and addressed.    CURRENT MEDICATIONS  Current Outpatient Medications   Medication Sig   • warfarin (COUMADIN) 2.5 MG tablet Take 5 mg on Tues, 2.5 mg all the other days.   • omeprazole (PrilOSEC) 20 MG capsule Take 1 capsule by mouth daily.   • ondansetron (ZOFRAN ODT) 4 MG disintegrating tablet Place 1 tablet onto the tongue every 8 hours as needed for Nausea.   • metoPROLOL succinate (TOPROL-XL) 100 MG 24 hr tablet Take 1.5 tablets by mouth daily.   • DIPHENHYDRAMINE HCL PO Take 1 tablet by mouth at bedtime. Takes prn   • triamterene-hydrochlorothiazide (DYAZIDE) 37.5-25 MG per capsule TAKE 1 CAPSULE BY MOUTH ONCE DAILY IN THE MORNING.   • dilTIAZem (CARDIZEM CD) 360 MG 24 hr capsule Take 1 capsule by mouth daily.   • diphenhydrAMINE-APAP, sleep, (TYLENOL PM EXTRA STRENGTH PO) Take 1 tablet by mouth nightly as needed. Indications: Trouble Sleeping    • Elastic Bandages & Supports (MEDICAL COMPRESSION STOCKINGS) Misc 1 packet daily. 30-40 mmHg knee high compression stockings   • Probiotic  Product (PROBIOTIC DAILY PO) Take 1 tablet by mouth daily.    • Cholecalciferol (VITAMIN D3) 2000 UNITS capsule Take 2,000 Units by mouth daily.    • Multiple Vitamin capsule Take 1 capsule by mouth daily.   • B Complex Vitamins (B COMPLEX 100 PO) Take 1 tablet by mouth daily.      No current facility-administered medications for this visit.       PAST MEDICAL HISTORY  Past Medical History:   Diagnosis Date   • Arthritis    • Atrial fibrillation (CMS/HCC)    • Blood clot associated with vein wall inflammation     DVTs--2005 initial   • Clotting disorder (CMS/HCC)    • Combined forms of age-related cataract of both eyes 08/09/2018   • Coronary artery disease 2018    atrial fib   • Encounter for therapeutic drug monitoring 07/01/2013   • HTN (hypertension)    • Need for lipid screening 09/04/2014   • Varicose veins of both lower extremities with complications 01/02/2018       SOCIAL HISTORY  Social History     Tobacco Use   • Smoking status: Never Smoker   • Smokeless tobacco: Never Used   Vaping Use   • Vaping Use: never used   Substance Use Topics   • Alcohol use: Yes     Alcohol/week: 7.0 standard drinks     Types: 7 Shots of liquor per week   • Drug use: No       FAMILY HISTORY  Family History   Problem Relation Age of Onset   • Cancer Mother         breast   • Thyroid Mother    • Macular degeneration Mother    • Diabetes Father    • Rheumatoid Arthritis Sister    • Cancer Maternal Grandmother         gastric   • Atrial Fibrilliation Son        OBJECTIVES   PHYSICAL EXAMINATION  Oncology Encounter Vitals [08/15/22 1452]   ONC OP Encounter Vitals Group      /84      Heart Rate 74      Resp 20      Temp 97 °F (36.1 °C)      Temp src Temporal      SpO2       Weight 219 lb 4.8 oz (99.5 kg)      Height 5' 6\" (1.676 m)      Pain Score  0      Pain Location       Pain Education?       BSA (Calculated - m2) - [employer] 2.08      BSA (Calculated - sq m) 2.15      BMI (Calculated) 35.4       ECOG Performance  Status   ECOG   ECOG Performance Status           GEN:  NAD, Well-Nourished, Very Pleasant  Eyes::  Normal eye movements. No scleral icterus or conjunctival pallor.    ENT:  Oropharynx reveals no exudate, thrush or ulcers or hemorrhages.    Neck:  Trachea midline.   Respiratory:  Normal chest expansion noted.   Cardiovascular:  Normal rhythm and rate.    Abdomen:  Soft, nontender.   Musculoskeletal:   No muscle or joint tenderness.    Neurologic:  No Acute focal neurological deficits. Cranial nerves intact.  No sensory deficit.  Moving all extremities spontaneously.   Skin:  No rashes, lesions, ulcers petechiae or hemorrhages on visualized areas.  Bilateral upper and lower extremities:  No clubbing, cyanosis or edema.    LABORATORY DATA  No results found for this visit on 08/15/22.  Lab Results   Component Value Date    WBC 5.8 08/01/2022    HGB 14.9 08/01/2022     08/01/2022    MCV 98.2 08/01/2022    Lab Results   Component Value Date    SODIUM 136 08/01/2022    POTASSIUM 3.7 08/01/2022    CHLORIDE 100 08/01/2022    CO2 22 08/01/2022    BUN 36 (H) 08/01/2022    CREATININE 1.54 (H) 08/01/2022    GLUCOSE 148 (H) 08/01/2022      Lab Results   Component Value Date     08/01/2022    Lab Results   Component Value Date    AST 80 (H) 08/01/2022     (H) 08/01/2022    ALKPT 270 (H) 08/01/2022    BILIRUBIN 0.7 08/01/2022    TOTPROTEIN 8.9 (H) 08/01/2022    TOTPROTEIN 8.5 (H) 08/01/2022    ALBUMIN 3.8 08/01/2022

## 2023-02-28 ENCOUNTER — TELEPHONE (OUTPATIENT)
Dept: FAMILY MEDICINE CLINIC | Age: 48
End: 2023-02-28

## 2023-02-28 DIAGNOSIS — E11.9 TYPE 2 DIABETES MELLITUS WITHOUT COMPLICATION, WITHOUT LONG-TERM CURRENT USE OF INSULIN (HCC): Primary | ICD-10-CM

## 2023-02-28 DIAGNOSIS — E11.9 TYPE 2 DIABETES MELLITUS WITHOUT COMPLICATION, WITHOUT LONG-TERM CURRENT USE OF INSULIN (HCC): ICD-10-CM

## 2023-02-28 DIAGNOSIS — E03.9 ACQUIRED HYPOTHYROIDISM: ICD-10-CM

## 2023-02-28 RX ORDER — LEVOTHYROXINE SODIUM 0.2 MG/1
TABLET ORAL
Qty: 90 TABLET | Refills: 0 | Status: SHIPPED | OUTPATIENT
Start: 2023-02-28

## 2023-02-28 NOTE — TELEPHONE ENCOUNTER
patient requesting medication refill. Please approve or deny this request.    Rx requested:  Requested Prescriptions     Pending Prescriptions Disp Refills    metFORMIN (GLUCOPHAGE) 500 MG tablet 90 tablet 3     Sig: TAKE ONE TABLET BY MOUTH ONCE DAILY WITH  BREAKFAST    levothyroxine (EUTHYROX) 200 MCG tablet 90 tablet 1     Sig: Take 1 tablet by mouth once daily         Last Office Visit:   5/27/2022      Next Visit Date:  No future appointments.

## 2023-11-20 ENCOUNTER — OFFICE VISIT (OUTPATIENT)
Dept: FAMILY MEDICINE CLINIC | Age: 48
End: 2023-11-20
Payer: COMMERCIAL

## 2023-11-20 VITALS
SYSTOLIC BLOOD PRESSURE: 178 MMHG | TEMPERATURE: 97.5 F | RESPIRATION RATE: 16 BRPM | DIASTOLIC BLOOD PRESSURE: 92 MMHG | WEIGHT: 200 LBS | OXYGEN SATURATION: 100 % | HEIGHT: 57 IN | BODY MASS INDEX: 43.15 KG/M2 | HEART RATE: 80 BPM

## 2023-11-20 DIAGNOSIS — J40 BRONCHITIS: Primary | ICD-10-CM

## 2023-11-20 DIAGNOSIS — J45.21 MILD INTERMITTENT ASTHMA WITH ACUTE EXACERBATION: ICD-10-CM

## 2023-11-20 PROCEDURE — 3080F DIAST BP >= 90 MM HG: CPT | Performed by: FAMILY MEDICINE

## 2023-11-20 PROCEDURE — 99213 OFFICE O/P EST LOW 20 MIN: CPT | Performed by: FAMILY MEDICINE

## 2023-11-20 PROCEDURE — 1036F TOBACCO NON-USER: CPT | Performed by: FAMILY MEDICINE

## 2023-11-20 PROCEDURE — G8484 FLU IMMUNIZE NO ADMIN: HCPCS | Performed by: FAMILY MEDICINE

## 2023-11-20 PROCEDURE — G8427 DOCREV CUR MEDS BY ELIG CLIN: HCPCS | Performed by: FAMILY MEDICINE

## 2023-11-20 PROCEDURE — G8419 CALC BMI OUT NRM PARAM NOF/U: HCPCS | Performed by: FAMILY MEDICINE

## 2023-11-20 PROCEDURE — 3017F COLORECTAL CA SCREEN DOC REV: CPT | Performed by: FAMILY MEDICINE

## 2023-11-20 PROCEDURE — 3077F SYST BP >= 140 MM HG: CPT | Performed by: FAMILY MEDICINE

## 2023-11-20 RX ORDER — PREDNISONE 10 MG/1
TABLET ORAL
Qty: 40 TABLET | Refills: 0 | Status: SHIPPED | OUTPATIENT
Start: 2023-11-20

## 2023-11-20 RX ORDER — ALBUTEROL SULFATE 90 UG/1
2 AEROSOL, METERED RESPIRATORY (INHALATION) EVERY 6 HOURS PRN
Qty: 18 G | Refills: 3 | Status: SHIPPED | OUTPATIENT
Start: 2023-11-20

## 2023-11-20 RX ORDER — CEFDINIR 300 MG/1
300 CAPSULE ORAL 2 TIMES DAILY
Qty: 20 CAPSULE | Refills: 0 | Status: SHIPPED | OUTPATIENT
Start: 2023-11-20 | End: 2023-11-30

## 2023-11-20 SDOH — ECONOMIC STABILITY: HOUSING INSECURITY
IN THE LAST 12 MONTHS, WAS THERE A TIME WHEN YOU DID NOT HAVE A STEADY PLACE TO SLEEP OR SLEPT IN A SHELTER (INCLUDING NOW)?: NO

## 2023-11-20 SDOH — ECONOMIC STABILITY: FOOD INSECURITY: WITHIN THE PAST 12 MONTHS, THE FOOD YOU BOUGHT JUST DIDN'T LAST AND YOU DIDN'T HAVE MONEY TO GET MORE.: NEVER TRUE

## 2023-11-20 SDOH — ECONOMIC STABILITY: INCOME INSECURITY: HOW HARD IS IT FOR YOU TO PAY FOR THE VERY BASICS LIKE FOOD, HOUSING, MEDICAL CARE, AND HEATING?: NOT HARD AT ALL

## 2023-11-20 SDOH — ECONOMIC STABILITY: FOOD INSECURITY: WITHIN THE PAST 12 MONTHS, YOU WORRIED THAT YOUR FOOD WOULD RUN OUT BEFORE YOU GOT MONEY TO BUY MORE.: NEVER TRUE

## 2023-11-20 ASSESSMENT — PATIENT HEALTH QUESTIONNAIRE - PHQ9
1. LITTLE INTEREST OR PLEASURE IN DOING THINGS: 0
SUM OF ALL RESPONSES TO PHQ QUESTIONS 1-9: 0
SUM OF ALL RESPONSES TO PHQ9 QUESTIONS 1 & 2: 0
2. FEELING DOWN, DEPRESSED OR HOPELESS: 0
SUM OF ALL RESPONSES TO PHQ QUESTIONS 1-9: 0

## 2023-11-20 ASSESSMENT — ENCOUNTER SYMPTOMS
EYES NEGATIVE: 1
COUGH: 1
CHEST TIGHTNESS: 0
RHINORRHEA: 0
GASTROINTESTINAL NEGATIVE: 1

## 2023-11-20 NOTE — PROGRESS NOTES
friction rub. No gallop. Pulmonary:      Effort: Pulmonary effort is normal. No respiratory distress. Breath sounds: No wheezing or rales. Chest:      Chest wall: No tenderness. Abdominal:      General: Bowel sounds are normal. There is no distension. Palpations: Abdomen is soft. There is no mass. Tenderness: There is no abdominal tenderness. There is no guarding or rebound. Musculoskeletal:         General: Normal range of motion. Cervical back: Normal range of motion and neck supple. Lymphadenopathy:      Cervical: No cervical adenopathy. Skin:     General: Skin is warm and dry. Neurological:      Mental Status: She is alert and oriented to person, place, and time. Cranial Nerves: No cranial nerve deficit. Coordination: Coordination normal.         Assessment   Diagnosis Orders   1. Bronchitis        2. Mild intermittent asthma with acute exacerbation          Problem List    None      Plan  No orders of the defined types were placed in this encounter. Orders Placed This Encounter   Medications    cefdinir (OMNICEF) 300 MG capsule     Sig: Take 1 capsule by mouth 2 times daily for 10 days     Dispense:  20 capsule     Refill:  0    predniSONE (DELTASONE) 10 MG tablet     Si po for 4 days 3 po for 4 days 2 po for 4 days 1 po for 4 days     Dispense:  40 tablet     Refill:  0    albuterol sulfate HFA (PROVENTIL;VENTOLIN;PROAIR) 108 (90 Base) MCG/ACT inhaler     Sig: Inhale 2 puffs into the lungs every 6 hours as needed for Wheezing     Dispense:  18 g     Refill:  3     No follow-ups on file.   Liza Zuniga MD

## 2024-03-07 ENCOUNTER — TELEPHONE (OUTPATIENT)
Dept: FAMILY MEDICINE CLINIC | Age: 49
End: 2024-03-07

## 2024-05-14 DIAGNOSIS — E03.9 ACQUIRED HYPOTHYROIDISM: ICD-10-CM

## 2024-05-14 DIAGNOSIS — J30.9 ALLERGIC RHINITIS, UNSPECIFIED SEASONALITY, UNSPECIFIED TRIGGER: ICD-10-CM

## 2024-05-14 RX ORDER — LEVOTHYROXINE SODIUM 0.2 MG/1
TABLET ORAL
Qty: 90 TABLET | Refills: 0 | Status: SHIPPED | OUTPATIENT
Start: 2024-05-14

## 2024-05-14 RX ORDER — LORATADINE 10 MG/1
10 TABLET ORAL DAILY
Qty: 30 TABLET | Refills: 3 | Status: SHIPPED | OUTPATIENT
Start: 2024-05-14

## 2024-05-14 NOTE — TELEPHONE ENCOUNTER
MEDICATION REFILL REQUEST     Rx Requested    Requested Prescriptions     Pending Prescriptions Disp Refills    levothyroxine (EUTHYROX) 200 MCG tablet 90 tablet 0     Sig: Take 1 tablet by mouth once daily NEEDS APPT FOR ADDITIONAL REFILLS    loratadine (CLARITIN) 10 MG tablet 30 tablet 3     Sig: Take 1 tablet by mouth daily         Patient's Last Office Visit   5/27/2022      Patient's Next Office Visit   Future Appointments   Date Time Provider Department Center   5/20/2024  3:45 PM Anika Stroud PA-C [facility]  [facility]         Other comments

## 2024-11-03 ENCOUNTER — HOSPITAL ENCOUNTER (EMERGENCY)
Age: 49
Discharge: HOME | End: 2024-11-03
Attending: STUDENT IN AN ORGANIZED HEALTH CARE EDUCATION/TRAINING PROGRAM
Payer: COMMERCIAL

## 2024-11-03 ENCOUNTER — APPOINTMENT (OUTPATIENT)
Dept: GENERAL RADIOLOGY | Age: 49
End: 2024-11-03
Payer: COMMERCIAL

## 2024-11-03 VITALS
BODY MASS INDEX: 37.79 KG/M2 | TEMPERATURE: 98.9 F | RESPIRATION RATE: 19 BRPM | WEIGHT: 180 LBS | HEIGHT: 58 IN | HEART RATE: 74 BPM | DIASTOLIC BLOOD PRESSURE: 80 MMHG | OXYGEN SATURATION: 99 % | SYSTOLIC BLOOD PRESSURE: 157 MMHG

## 2024-11-03 DIAGNOSIS — R42 CHRONIC VERTIGO: ICD-10-CM

## 2024-11-03 DIAGNOSIS — U07.1 COVID-19: Primary | ICD-10-CM

## 2024-11-03 LAB
GLUCOSE BLD-MCNC: 143 MG/DL (ref 70–99)
PERFORMED ON: ABNORMAL
SARS-COV-2 RDRP RESP QL NAA+PROBE: DETECTED

## 2024-11-03 PROCEDURE — 96372 THER/PROPH/DIAG INJ SC/IM: CPT

## 2024-11-03 PROCEDURE — 93005 ELECTROCARDIOGRAM TRACING: CPT | Performed by: STUDENT IN AN ORGANIZED HEALTH CARE EDUCATION/TRAINING PROGRAM

## 2024-11-03 PROCEDURE — 87635 SARS-COV-2 COVID-19 AMP PRB: CPT

## 2024-11-03 PROCEDURE — 71045 X-RAY EXAM CHEST 1 VIEW: CPT

## 2024-11-03 PROCEDURE — 6370000000 HC RX 637 (ALT 250 FOR IP): Performed by: STUDENT IN AN ORGANIZED HEALTH CARE EDUCATION/TRAINING PROGRAM

## 2024-11-03 PROCEDURE — 99285 EMERGENCY DEPT VISIT HI MDM: CPT

## 2024-11-03 PROCEDURE — 6360000002 HC RX W HCPCS: Performed by: STUDENT IN AN ORGANIZED HEALTH CARE EDUCATION/TRAINING PROGRAM

## 2024-11-03 RX ORDER — KETOROLAC TROMETHAMINE 30 MG/ML
30 INJECTION, SOLUTION INTRAMUSCULAR; INTRAVENOUS ONCE
Status: COMPLETED | OUTPATIENT
Start: 2024-11-03 | End: 2024-11-03

## 2024-11-03 RX ORDER — PROMETHAZINE HYDROCHLORIDE 12.5 MG/1
12.5 TABLET ORAL EVERY 8 HOURS PRN
Qty: 9 TABLET | Refills: 0 | Status: SHIPPED | OUTPATIENT
Start: 2024-11-03 | End: 2024-11-06

## 2024-11-03 RX ORDER — MECLIZINE HYDROCHLORIDE 25 MG/1
25 TABLET ORAL 3 TIMES DAILY PRN
Qty: 30 TABLET | Refills: 0 | Status: SHIPPED | OUTPATIENT
Start: 2024-11-03 | End: 2024-11-13

## 2024-11-03 RX ORDER — MECLIZINE HYDROCHLORIDE 25 MG/1
25 TABLET ORAL ONCE
Status: COMPLETED | OUTPATIENT
Start: 2024-11-03 | End: 2024-11-03

## 2024-11-03 RX ORDER — ONDANSETRON 4 MG/1
4 TABLET, ORALLY DISINTEGRATING ORAL ONCE
Status: COMPLETED | OUTPATIENT
Start: 2024-11-03 | End: 2024-11-03

## 2024-11-03 RX ORDER — NIRMATRELVIR AND RITONAVIR 150-100 MG
KIT ORAL
Qty: 20 TABLET | Refills: 0 | Status: SHIPPED | OUTPATIENT
Start: 2024-11-03 | End: 2024-11-08

## 2024-11-03 RX ADMIN — MECLIZINE HYDROCHLORIDE 25 MG: 25 TABLET ORAL at 05:56

## 2024-11-03 RX ADMIN — KETOROLAC TROMETHAMINE 30 MG: 30 INJECTION, SOLUTION INTRAMUSCULAR at 05:58

## 2024-11-03 RX ADMIN — ONDANSETRON 4 MG: 4 TABLET, ORALLY DISINTEGRATING ORAL at 05:56

## 2024-11-03 ASSESSMENT — PAIN DESCRIPTION - LOCATION: LOCATION: HEAD

## 2024-11-03 ASSESSMENT — PAIN SCALES - GENERAL: PAINLEVEL_OUTOF10: 7

## 2024-11-03 ASSESSMENT — PAIN - FUNCTIONAL ASSESSMENT
PAIN_FUNCTIONAL_ASSESSMENT: ACTIVITIES ARE NOT PREVENTED
PAIN_FUNCTIONAL_ASSESSMENT: NONE - DENIES PAIN

## 2024-11-03 ASSESSMENT — LIFESTYLE VARIABLES
HOW OFTEN DO YOU HAVE A DRINK CONTAINING ALCOHOL: NEVER
HOW MANY STANDARD DRINKS CONTAINING ALCOHOL DO YOU HAVE ON A TYPICAL DAY: PATIENT DOES NOT DRINK

## 2024-11-03 ASSESSMENT — PAIN DESCRIPTION - DESCRIPTORS: DESCRIPTORS: THROBBING

## 2024-11-03 NOTE — DISCHARGE INSTRUCTIONS
Use Tylenol and Motrin for symptom relief  Continue your inhaler as needed  Use the Antivert as needed for dizziness  Take Paxlovid to help reduce your chances of problems from COVID    Take your medication as indicated and prescribed.  If you were given a prescription for prednisone or any other steroid then, take Pepcid (famotidine - over the counter) every day while you are taking the steroids.  If you are a diabetic, you should check your blood sugar more frequently while taking prednisone.  Use you use an inhaler or nebulizer or were given one to use, then use as prescribed, or at minimum every 4 hours while you are having shortness of breath.    If you are given an antibiotic then, make sure you get the prescription filled and take the antibiotics until finished.  Drink plenty of water while taking the antibiotics.  Avoid drinking alcohol or drinks that have caffeine in it while taking antibiotics.       For pain use acetaminophen (Tylenol) or ibuprofen (Motrin / Advil), unless prescribed medications that have acetaminophen or ibuprofen (or similar medications) in it.  You can take over the counter acetaminophen tablets (1 - 2 tablets of the 500-mg strength every 6 hours) or ibuprofen tablets (2 tablets every 4 hours).    PLEASE RETURN TO THE EMERGENCY DEPARTMENT IMMEDIATELY for worsening symptoms of shortness of breath, wheezing, change in the amount of sputum that you cough up or a change in the color of your sputum, using your inhaler more frequently or if your inhaler only lasts up to 2 hours, or if you develop any concerning symptoms such as: high fever not relieved by acetaminophen (Tylenol) and/or ibuprofen (Motrin / Advil), chills, shortness of breath, chest pain, feeling of your heart fluttering or racing, persistent nausea and/or vomiting, vomiting up blood, blood in your stool, loss of consciousness, numbness, weakness or tingling in the arms or legs or change in color of the extremities, changes in

## 2024-11-03 NOTE — ED PROVIDER NOTES
interpretation     All EKG's are interpreted by the Emergency Department Physician who either signs or Co-signs this chart in the absence of a cardiologist.      PROCEDURES:  None    CONSULTS:  None    Critical Care Time:  none    FINAL IMPRESSION      1. COVID-19    2. Chronic vertigo          DISPOSITION / PLAN     DISPOSITION Decision To Discharge 11/03/2024 06:28:23 AM           PATIENT REFERREDTO:  Anika Stroud PA-C  [address] [id number]  [phone]    Call in 1 day        DISCHARGE MEDICATIONS:  New Prescriptions    MECLIZINE (ANTIVERT) 25 MG TABLET    Take 1 tablet by mouth 3 times daily as needed for Dizziness    NIRMATRELVIR/RITONAVIR (PAXLOVID, 150/100,) 10 X 150 MG & 10 X 100MG TBPK    Take 2 tablets (one 150 mg nirmatrelvir and one 100 mg ritonavir tablets) by mouth every 12 hours for 5 days.    PROMETHAZINE (PHENERGAN) 12.5 MG TABLET    Take 1 tablet by mouth every 8 hours as needed for Nausea       Freeman Del Real DO  Emergency Medicine Physician  11/03/24 6:40 AM        (Please note that portions of this note were completed with a voice recognition program.Efforts were made to edit the dictations but occasionally words are mis-transcribed.)        Freeman Del Real DO  11/03/24 [id number]

## 2024-11-03 NOTE — ED TRIAGE NOTES
Pt to ER with c/o SOB since Friday, has hx of asthma and bronchitis, breathing tx and inhalers not working, also has c/o intermittent dizziness since last week and states she has a hx of vertigo

## 2024-11-03 NOTE — ED NOTES
D/C instructions to patient who voices understanding.  Ambulatory from ED with no distress observed.  Respirations are even and non-labored.

## 2024-11-05 LAB
EKG ATRIAL RATE: 58 BPM
EKG P AXIS: 45 DEGREES
EKG P-R INTERVAL: 134 MS
EKG Q-T INTERVAL: 488 MS
EKG QRS DURATION: 90 MS
EKG QTC CALCULATION (BAZETT): 479 MS
EKG R AXIS: -59 DEGREES
EKG T AXIS: 152 DEGREES
EKG VENTRICULAR RATE: 58 BPM

## 2024-11-05 PROCEDURE — 93010 ELECTROCARDIOGRAM REPORT: CPT | Performed by: INTERNAL MEDICINE

## 2025-05-02 ENCOUNTER — OFFICE VISIT (OUTPATIENT)
Age: 50
End: 2025-05-02
Payer: COMMERCIAL

## 2025-05-02 VITALS
SYSTOLIC BLOOD PRESSURE: 136 MMHG | BODY MASS INDEX: 38.62 KG/M2 | HEART RATE: 54 BPM | DIASTOLIC BLOOD PRESSURE: 82 MMHG | WEIGHT: 184 LBS | HEIGHT: 58 IN | TEMPERATURE: 97.6 F | OXYGEN SATURATION: 95 %

## 2025-05-02 DIAGNOSIS — E03.9 ACQUIRED HYPOTHYROIDISM: ICD-10-CM

## 2025-05-02 DIAGNOSIS — Z12.31 SCREENING MAMMOGRAM, ENCOUNTER FOR: ICD-10-CM

## 2025-05-02 DIAGNOSIS — R60.0 BILATERAL LEG EDEMA: ICD-10-CM

## 2025-05-02 DIAGNOSIS — R53.83 FATIGUE, UNSPECIFIED TYPE: ICD-10-CM

## 2025-05-02 DIAGNOSIS — I10 ESSENTIAL HYPERTENSION: ICD-10-CM

## 2025-05-02 DIAGNOSIS — F32.1 CURRENT MODERATE EPISODE OF MAJOR DEPRESSIVE DISORDER, UNSPECIFIED WHETHER RECURRENT (HCC): ICD-10-CM

## 2025-05-02 DIAGNOSIS — J45.20 MILD INTERMITTENT ASTHMA WITHOUT COMPLICATION: ICD-10-CM

## 2025-05-02 DIAGNOSIS — Z12.11 COLON CANCER SCREENING: ICD-10-CM

## 2025-05-02 DIAGNOSIS — E11.9 TYPE 2 DIABETES MELLITUS WITHOUT COMPLICATION, WITHOUT LONG-TERM CURRENT USE OF INSULIN (HCC): ICD-10-CM

## 2025-05-02 DIAGNOSIS — E11.9 TYPE 2 DIABETES MELLITUS WITHOUT COMPLICATION, WITHOUT LONG-TERM CURRENT USE OF INSULIN (HCC): Primary | ICD-10-CM

## 2025-05-02 DIAGNOSIS — J30.9 ALLERGIC RHINITIS, UNSPECIFIED SEASONALITY, UNSPECIFIED TRIGGER: ICD-10-CM

## 2025-05-02 LAB
ALBUMIN SERPL-MCNC: 4.5 G/DL (ref 3.5–4.6)
ALP SERPL-CCNC: 59 U/L (ref 40–130)
ALT SERPL-CCNC: 28 U/L (ref 0–33)
ANION GAP SERPL CALCULATED.3IONS-SCNC: 13 MEQ/L (ref 9–15)
AST SERPL-CCNC: 83 U/L (ref 0–35)
BASOPHILS # BLD: 0.1 K/UL (ref 0–0.2)
BASOPHILS NFR BLD: 1.8 %
BILIRUB SERPL-MCNC: 0.5 MG/DL (ref 0.2–0.7)
BUN SERPL-MCNC: 13 MG/DL (ref 6–20)
CALCIUM SERPL-MCNC: 9.2 MG/DL (ref 8.5–9.9)
CHLORIDE SERPL-SCNC: 90 MEQ/L (ref 95–107)
CHOLEST SERPL-MCNC: 485 MG/DL (ref 0–199)
CO2 SERPL-SCNC: 32 MEQ/L (ref 20–31)
CREAT SERPL-MCNC: 1.4 MG/DL (ref 0.5–0.9)
CREAT UR-MCNC: 200.9 MG/DL
EOSINOPHIL # BLD: 0.2 K/UL (ref 0–0.7)
EOSINOPHIL NFR BLD: 3.8 %
ERYTHROCYTE [DISTWIDTH] IN BLOOD BY AUTOMATED COUNT: 14.4 % (ref 11.5–14.5)
GLOBULIN SER CALC-MCNC: 3.5 G/DL (ref 2.3–3.5)
GLUCOSE SERPL-MCNC: 127 MG/DL (ref 70–99)
HBA1C MFR BLD: 6.8 %
HCT VFR BLD AUTO: 38.1 % (ref 37–47)
HDLC SERPL-MCNC: 38 MG/DL (ref 40–59)
HGB BLD-MCNC: 12.7 G/DL (ref 12–16)
LDL CHOLESTEROL: 418 MG/DL (ref 0–129)
LYMPHOCYTES # BLD: 1.6 K/UL (ref 1–4.8)
LYMPHOCYTES NFR BLD: 32.7 %
MCH RBC QN AUTO: 30.5 PG (ref 27–31.3)
MCHC RBC AUTO-ENTMCNC: 33.3 % (ref 33–37)
MCV RBC AUTO: 91.4 FL (ref 79.4–94.8)
MICROALBUMIN UR-MCNC: 3.6 MG/DL
MICROALBUMIN/CREAT UR-RTO: 17.9 MG/G (ref 0–30)
MONOCYTES # BLD: 0.3 K/UL (ref 0.2–0.8)
MONOCYTES NFR BLD: 5.8 %
NEUTROPHILS # BLD: 2.8 K/UL (ref 1.4–6.5)
NEUTS SEG NFR BLD: 55.7 %
PLATELET # BLD AUTO: 228 K/UL (ref 130–400)
POTASSIUM SERPL-SCNC: 3.2 MEQ/L (ref 3.4–4.9)
PROT SERPL-MCNC: 8 G/DL (ref 6.3–8)
RBC # BLD AUTO: 4.17 M/UL (ref 4.2–5.4)
SODIUM SERPL-SCNC: 135 MEQ/L (ref 135–144)
T4 FREE SERPL-MCNC: <0.03 NG/DL (ref 0.84–1.68)
TRIGLYCERIDE, FASTING: 145 MG/DL (ref 0–150)
TSH REFLEX: 361.5 UIU/ML (ref 0.44–3.86)
WBC # BLD AUTO: 5 K/UL (ref 4.8–10.8)

## 2025-05-02 PROCEDURE — 2022F DILAT RTA XM EVC RTNOPTHY: CPT | Performed by: PHYSICIAN ASSISTANT

## 2025-05-02 PROCEDURE — 1036F TOBACCO NON-USER: CPT | Performed by: PHYSICIAN ASSISTANT

## 2025-05-02 PROCEDURE — 83036 HEMOGLOBIN GLYCOSYLATED A1C: CPT | Performed by: PHYSICIAN ASSISTANT

## 2025-05-02 PROCEDURE — 3017F COLORECTAL CA SCREEN DOC REV: CPT | Performed by: PHYSICIAN ASSISTANT

## 2025-05-02 PROCEDURE — 3046F HEMOGLOBIN A1C LEVEL >9.0%: CPT | Performed by: PHYSICIAN ASSISTANT

## 2025-05-02 PROCEDURE — 3079F DIAST BP 80-89 MM HG: CPT | Performed by: PHYSICIAN ASSISTANT

## 2025-05-02 PROCEDURE — 99214 OFFICE O/P EST MOD 30 MIN: CPT | Performed by: PHYSICIAN ASSISTANT

## 2025-05-02 PROCEDURE — G8417 CALC BMI ABV UP PARAM F/U: HCPCS | Performed by: PHYSICIAN ASSISTANT

## 2025-05-02 PROCEDURE — 3075F SYST BP GE 130 - 139MM HG: CPT | Performed by: PHYSICIAN ASSISTANT

## 2025-05-02 PROCEDURE — G8427 DOCREV CUR MEDS BY ELIG CLIN: HCPCS | Performed by: PHYSICIAN ASSISTANT

## 2025-05-02 RX ORDER — NABUMETONE 500 MG/1
500 TABLET, FILM COATED ORAL 2 TIMES DAILY
Qty: 60 TABLET | Refills: 3 | Status: CANCELLED | OUTPATIENT
Start: 2025-05-02

## 2025-05-02 RX ORDER — GLUCOSAMINE HCL/CHONDROITIN SU 500-400 MG
CAPSULE ORAL
Qty: 100 STRIP | Refills: 5 | Status: SHIPPED | OUTPATIENT
Start: 2025-05-02

## 2025-05-02 RX ORDER — BLOOD-GLUCOSE METER
1 KIT MISCELLANEOUS DAILY
Qty: 1 KIT | Refills: 0 | Status: SHIPPED | OUTPATIENT
Start: 2025-05-02

## 2025-05-02 RX ORDER — LISINOPRIL 20 MG/1
20 TABLET ORAL DAILY
Qty: 30 TABLET | Refills: 3 | Status: CANCELLED | OUTPATIENT
Start: 2025-05-02

## 2025-05-02 RX ORDER — LEVOTHYROXINE SODIUM 88 UG/1
88 TABLET ORAL DAILY
Qty: 90 TABLET | Refills: 1 | Status: SHIPPED | OUTPATIENT
Start: 2025-05-02

## 2025-05-02 RX ORDER — ALBUTEROL SULFATE 90 UG/1
2 INHALANT RESPIRATORY (INHALATION) EVERY 6 HOURS PRN
Qty: 18 G | Refills: 5 | Status: SHIPPED | OUTPATIENT
Start: 2025-05-02

## 2025-05-02 RX ORDER — LEVOTHYROXINE SODIUM 200 UG/1
TABLET ORAL
Qty: 90 TABLET | Refills: 0 | Status: CANCELLED | OUTPATIENT
Start: 2025-05-02

## 2025-05-02 RX ORDER — AVOBENZONE, HOMOSALATE, OCTISALATE, OCTOCRYLENE 30; 40; 45; 26 MG/ML; MG/ML; MG/ML; MG/ML
1 CREAM TOPICAL 2 TIMES DAILY
Qty: 300 EACH | Refills: 3 | Status: SHIPPED | OUTPATIENT
Start: 2025-05-02

## 2025-05-02 RX ORDER — IBUPROFEN 800 MG/1
800 TABLET, FILM COATED ORAL EVERY 6 HOURS PRN
Qty: 120 TABLET | Refills: 1 | Status: CANCELLED | OUTPATIENT
Start: 2025-05-02

## 2025-05-02 RX ORDER — FLUTICASONE PROPIONATE 50 MCG
1 SPRAY, SUSPENSION (ML) NASAL DAILY
Qty: 16 G | Refills: 5 | Status: SHIPPED | OUTPATIENT
Start: 2025-05-02

## 2025-05-02 RX ORDER — LORATADINE 10 MG/1
10 TABLET ORAL DAILY
Qty: 30 TABLET | Refills: 3 | Status: CANCELLED | OUTPATIENT
Start: 2025-05-02

## 2025-05-02 RX ORDER — LORATADINE 10 MG/1
10 TABLET ORAL DAILY
Qty: 30 TABLET | Refills: 5 | Status: SHIPPED | OUTPATIENT
Start: 2025-05-02

## 2025-05-02 RX ORDER — HYDROCHLOROTHIAZIDE 12.5 MG/1
12.5 CAPSULE ORAL DAILY
Qty: 90 CAPSULE | Refills: 1 | Status: SHIPPED | OUTPATIENT
Start: 2025-05-02

## 2025-05-02 SDOH — ECONOMIC STABILITY: FOOD INSECURITY: WITHIN THE PAST 12 MONTHS, THE FOOD YOU BOUGHT JUST DIDN'T LAST AND YOU DIDN'T HAVE MONEY TO GET MORE.: NEVER TRUE

## 2025-05-02 SDOH — ECONOMIC STABILITY: FOOD INSECURITY: WITHIN THE PAST 12 MONTHS, YOU WORRIED THAT YOUR FOOD WOULD RUN OUT BEFORE YOU GOT MONEY TO BUY MORE.: NEVER TRUE

## 2025-05-02 ASSESSMENT — PATIENT HEALTH QUESTIONNAIRE - PHQ9
SUM OF ALL RESPONSES TO PHQ QUESTIONS 1-9: 12
6. FEELING BAD ABOUT YOURSELF - OR THAT YOU ARE A FAILURE OR HAVE LET YOURSELF OR YOUR FAMILY DOWN: MORE THAN HALF THE DAYS
7. TROUBLE CONCENTRATING ON THINGS, SUCH AS READING THE NEWSPAPER OR WATCHING TELEVISION: NOT AT ALL
1. LITTLE INTEREST OR PLEASURE IN DOING THINGS: SEVERAL DAYS
8. MOVING OR SPEAKING SO SLOWLY THAT OTHER PEOPLE COULD HAVE NOTICED. OR THE OPPOSITE, BEING SO FIGETY OR RESTLESS THAT YOU HAVE BEEN MOVING AROUND A LOT MORE THAN USUAL: SEVERAL DAYS
4. FEELING TIRED OR HAVING LITTLE ENERGY: MORE THAN HALF THE DAYS
3. TROUBLE FALLING OR STAYING ASLEEP: MORE THAN HALF THE DAYS
9. THOUGHTS THAT YOU WOULD BE BETTER OFF DEAD, OR OF HURTING YOURSELF: NOT AT ALL
SUM OF ALL RESPONSES TO PHQ QUESTIONS 1-9: 12
10. IF YOU CHECKED OFF ANY PROBLEMS, HOW DIFFICULT HAVE THESE PROBLEMS MADE IT FOR YOU TO DO YOUR WORK, TAKE CARE OF THINGS AT HOME, OR GET ALONG WITH OTHER PEOPLE: NOT DIFFICULT AT ALL
SUM OF ALL RESPONSES TO PHQ QUESTIONS 1-9: 12
2. FEELING DOWN, DEPRESSED OR HOPELESS: MORE THAN HALF THE DAYS
5. POOR APPETITE OR OVEREATING: MORE THAN HALF THE DAYS
SUM OF ALL RESPONSES TO PHQ QUESTIONS 1-9: 12

## 2025-05-02 ASSESSMENT — ENCOUNTER SYMPTOMS
SINUS PRESSURE: 1
TROUBLE SWALLOWING: 0
SORE THROAT: 0
RHINORRHEA: 1
VOICE CHANGE: 1

## 2025-05-02 NOTE — PROGRESS NOTES
indicated testing frequency plus additional to accommodate PRN testing needs.     Dispense:  100 strip     Refill:  5     (1) Identify specific brand and product.  (2) Include specific quantity.  (3) Include frequency.    Lancets MISC     Si each by Does not apply route 2 times daily     Dispense:  300 each     Refill:  3     Medications Discontinued During This Encounter   Medication Reason    fluticasone (FLONASE) 50 MCG/ACT nasal spray LIST CLEANUP    glucose monitoring kit (FREESTYLE) monitoring kit LIST CLEANUP    EQ ALLERGY RELIEF 10 MG tablet REORDER    hydroCHLOROthiazide (MICROZIDE) 12.5 MG capsule REORDER    fluticasone (FLONASE) 50 MCG/ACT nasal spray REORDER    metFORMIN (GLUCOPHAGE) 500 MG tablet REORDER    albuterol sulfate HFA (PROVENTIL;VENTOLIN;PROAIR) 108 (90 Base) MCG/ACT inhaler REORDER     Return in about 2 months (around 2025), or if symptoms worsen or fail to improve.    Anika Stroud PA-C

## 2025-05-03 LAB
FOLATE: 6.6 NG/ML (ref 4.8–24.2)
VITAMIN B-12: 241 PG/ML (ref 232–1245)
VITAMIN D 25-HYDROXY: 8.7 NG/ML (ref 30–100)

## 2025-05-04 ENCOUNTER — RESULTS FOLLOW-UP (OUTPATIENT)
Age: 50
End: 2025-05-04

## 2025-05-04 DIAGNOSIS — E78.2 MIXED HYPERLIPIDEMIA: ICD-10-CM

## 2025-05-04 DIAGNOSIS — E87.6 HYPOKALEMIA: Primary | ICD-10-CM

## 2025-05-04 DIAGNOSIS — E55.9 VITAMIN D DEFICIENCY: Primary | ICD-10-CM

## 2025-05-04 RX ORDER — METHOCARBAMOL 750 MG/1
50000 TABLET ORAL DAILY
Qty: 4 CAPSULE | Refills: 2 | Status: SHIPPED | OUTPATIENT
Start: 2025-05-04

## 2025-05-04 RX ORDER — ATORVASTATIN CALCIUM 40 MG/1
40 TABLET, FILM COATED ORAL DAILY
Qty: 30 TABLET | Refills: 3 | Status: SHIPPED | OUTPATIENT
Start: 2025-05-04

## 2025-05-04 RX ORDER — POTASSIUM CHLORIDE 1500 MG/1
10 TABLET, EXTENDED RELEASE ORAL DAILY
Qty: 5 TABLET | Refills: 0 | Status: SHIPPED | OUTPATIENT
Start: 2025-05-04

## 2025-05-05 ENCOUNTER — TELEPHONE (OUTPATIENT)
Age: 50
End: 2025-05-05

## 2025-05-05 DIAGNOSIS — E55.9 VITAMIN D DEFICIENCY: ICD-10-CM

## 2025-05-05 RX ORDER — METHOCARBAMOL 750 MG/1
50000 TABLET ORAL WEEKLY
Qty: 4 CAPSULE | Refills: 2 | Status: SHIPPED | OUTPATIENT
Start: 2025-05-05

## 2025-05-05 NOTE — TELEPHONE ENCOUNTER
[facility] calling in regarding Vitamin D    Prescribed 50,000 units daily    Typically this is 1x a week - please clarify    [facility] phone [phone]

## 2025-07-09 ENCOUNTER — OFFICE VISIT (OUTPATIENT)
Age: 50
End: 2025-07-09
Payer: COMMERCIAL

## 2025-07-09 VITALS
BODY MASS INDEX: 33.17 KG/M2 | SYSTOLIC BLOOD PRESSURE: 102 MMHG | HEIGHT: 58 IN | HEART RATE: 55 BPM | DIASTOLIC BLOOD PRESSURE: 68 MMHG | OXYGEN SATURATION: 100 % | TEMPERATURE: 97.6 F | WEIGHT: 158 LBS

## 2025-07-09 DIAGNOSIS — E78.2 MIXED HYPERLIPIDEMIA: ICD-10-CM

## 2025-07-09 DIAGNOSIS — E56.9 VITAMIN DEFICIENCY: ICD-10-CM

## 2025-07-09 DIAGNOSIS — E03.9 ACQUIRED HYPOTHYROIDISM: ICD-10-CM

## 2025-07-09 DIAGNOSIS — F33.41 RECURRENT MAJOR DEPRESSIVE DISORDER, IN PARTIAL REMISSION: Primary | ICD-10-CM

## 2025-07-09 DIAGNOSIS — Z12.31 SCREENING MAMMOGRAM, ENCOUNTER FOR: ICD-10-CM

## 2025-07-09 DIAGNOSIS — Z12.11 COLON CANCER SCREENING: ICD-10-CM

## 2025-07-09 DIAGNOSIS — M47.26 OSTEOARTHRITIS OF SPINE WITH RADICULOPATHY, LUMBAR REGION: ICD-10-CM

## 2025-07-09 LAB
ALBUMIN SERPL-MCNC: 4.4 G/DL (ref 3.5–4.6)
ALP SERPL-CCNC: 88 U/L (ref 40–130)
ALT SERPL-CCNC: 16 U/L (ref 0–33)
AST SERPL-CCNC: 28 U/L (ref 0–35)
BILIRUB DIRECT SERPL-MCNC: <0.1 MG/DL (ref 0–0.4)
BILIRUB INDIRECT SERPL-MCNC: 0.2 MG/DL (ref 0–0.6)
BILIRUB SERPL-MCNC: 0.3 MG/DL (ref 0.2–0.7)
CHOLEST SERPL-MCNC: 216 MG/DL (ref 0–199)
HDLC SERPL-MCNC: 27 MG/DL (ref 40–59)
LDL CHOLESTEROL: 159 MG/DL (ref 0–129)
PROT SERPL-MCNC: 7.9 G/DL (ref 6.3–8)
T4 FREE SERPL-MCNC: 0.86 NG/DL (ref 0.84–1.68)
TRIGLYCERIDE, FASTING: 150 MG/DL (ref 0–150)
TSH REFLEX: 73.19 UIU/ML (ref 0.44–3.86)

## 2025-07-09 PROCEDURE — 3078F DIAST BP <80 MM HG: CPT | Performed by: PHYSICIAN ASSISTANT

## 2025-07-09 PROCEDURE — 99214 OFFICE O/P EST MOD 30 MIN: CPT | Performed by: PHYSICIAN ASSISTANT

## 2025-07-09 PROCEDURE — 3074F SYST BP LT 130 MM HG: CPT | Performed by: PHYSICIAN ASSISTANT

## 2025-07-09 PROCEDURE — G8417 CALC BMI ABV UP PARAM F/U: HCPCS | Performed by: PHYSICIAN ASSISTANT

## 2025-07-09 PROCEDURE — 1036F TOBACCO NON-USER: CPT | Performed by: PHYSICIAN ASSISTANT

## 2025-07-09 PROCEDURE — G8427 DOCREV CUR MEDS BY ELIG CLIN: HCPCS | Performed by: PHYSICIAN ASSISTANT

## 2025-07-09 PROCEDURE — 3017F COLORECTAL CA SCREEN DOC REV: CPT | Performed by: PHYSICIAN ASSISTANT

## 2025-07-09 ASSESSMENT — ENCOUNTER SYMPTOMS: BACK PAIN: 1

## 2025-07-09 NOTE — PROGRESS NOTES
Subjective  Nancy A Phoenix, 49 y.o. female presents today with:  Chief Complaint   Patient presents with    Follow-up     Patient presents today for 2 month f/up on TSH.        Adherence:   Medication compliance:  compliant most of the time  Diet compliance:  compliant most of the time  Weight trend: fluctuating  Current exercise: no regular exercise      Diabetes Mellitus Type 2: Current symptoms/problems include none.  blood sugar records:  patient does not test  Any episodes of hypoglycemia? no  Eye exam current (within one year): yes  Tobacco history: She  reports that she has never smoked. She has never used smokeless tobacco.   Daily Aspirin? No:   Known diabetic complications: none        Lab Results   Component Value Date    LABA1C 6.8 05/02/2025    LABA1C 9.3 05/27/2022    LABA1C 5.8 02/04/2021     Lab Results   Component Value Date    CREATININE 1.40 (H) 05/02/2025     Lab Results   Component Value Date    ALT 28 05/02/2025    AST 83 (H) 05/02/2025     Lab Results   Component Value Date    CHOL 343 (H) 12/22/2020    TRIG 185 (H) 12/22/2020    HDL 38 (L) 05/02/2025          HPI  Patient is here for follow-up on major depression hypertension and to discuss recent labs  No longer having suicidal thoughts still mourning the death of her  but depression has improved  She is looking to move which helps but still does not have transportation of her own and does not enjoy working the evening at [employer]  10 PM to 4 AM  Recent labs discussed TSH and lipid profile grossly out of range  She will repeat TSH studies today  Compliant with medicines doing better with her diet  She has lost 16 pounds over the past 2 months  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

## 2025-07-10 LAB — VITAMIN D 25-HYDROXY: 38.5 NG/ML (ref 30–100)

## 2025-07-15 ENCOUNTER — TELEPHONE (OUTPATIENT)
Age: 50
End: 2025-07-15

## 2025-07-15 RX ORDER — MECLIZINE HYDROCHLORIDE 25 MG/1
25 TABLET ORAL 3 TIMES DAILY PRN
Qty: 30 TABLET | Refills: 2 | Status: SHIPPED | OUTPATIENT
Start: 2025-07-15 | End: 2025-08-14

## 2025-07-15 NOTE — TELEPHONE ENCOUNTER
Spoke with Kimmie from radiology asking for ETA, Kimmie bruno she will expiated the report the Xr.

## 2025-07-15 NOTE — TELEPHONE ENCOUNTER
Patient is requesting a script of the meclizine 25 mg. She said she takes it as needed for her dizziness.      Patient's Last Office Visit  7/9/2025     Patient's Next Visit  Future Appointments   Date Time Provider Department Center   9/9/2025 11:00 AM Anika Stroud PA-C [facility]

## 2025-08-14 ENCOUNTER — HOSPITAL ENCOUNTER (OUTPATIENT)
Dept: PHYSICAL THERAPY | Age: 50
Setting detail: THERAPIES SERIES
End: 2025-08-14
Payer: COMMERCIAL

## 2025-08-22 ENCOUNTER — HOSPITAL ENCOUNTER (OUTPATIENT)
Dept: PHYSICAL THERAPY | Age: 50
Setting detail: THERAPIES SERIES
Discharge: HOME | End: 2025-08-22
Payer: COMMERCIAL

## 2025-08-22 PROCEDURE — 97161 PT EVAL LOW COMPLEX 20 MIN: CPT

## 2025-08-22 PROCEDURE — 97110 THERAPEUTIC EXERCISES: CPT

## 2025-08-29 ENCOUNTER — HOSPITAL ENCOUNTER (OUTPATIENT)
Dept: PHYSICAL THERAPY | Age: 50
Setting detail: THERAPIES SERIES
Discharge: HOME | End: 2025-08-29
Payer: COMMERCIAL